# Patient Record
Sex: MALE | Race: WHITE | Employment: OTHER | URBAN - METROPOLITAN AREA
[De-identification: names, ages, dates, MRNs, and addresses within clinical notes are randomized per-mention and may not be internally consistent; named-entity substitution may affect disease eponyms.]

---

## 2017-01-20 ENCOUNTER — TELEPHONE (OUTPATIENT)
Dept: INTERNAL MEDICINE CLINIC | Age: 62
End: 2017-01-20

## 2017-03-02 RX ORDER — ATORVASTATIN CALCIUM 40 MG/1
TABLET, FILM COATED ORAL
Qty: 30 TABLET | Refills: 0 | Status: SHIPPED | OUTPATIENT
Start: 2017-03-02 | End: 2017-03-10 | Stop reason: SDUPTHER

## 2017-03-10 ENCOUNTER — OFFICE VISIT (OUTPATIENT)
Dept: INTERNAL MEDICINE CLINIC | Age: 62
End: 2017-03-10

## 2017-03-10 VITALS
WEIGHT: 177.8 LBS | DIASTOLIC BLOOD PRESSURE: 74 MMHG | HEART RATE: 80 BPM | SYSTOLIC BLOOD PRESSURE: 162 MMHG | BODY MASS INDEX: 24.11 KG/M2 | RESPIRATION RATE: 16 BRPM

## 2017-03-10 DIAGNOSIS — I62.9 INTRACRANIAL BLEED (HCC): Primary | ICD-10-CM

## 2017-03-10 DIAGNOSIS — I10 ESSENTIAL HYPERTENSION: ICD-10-CM

## 2017-03-10 DIAGNOSIS — K92.2 GASTROINTESTINAL HEMORRHAGE, UNSPECIFIED GASTROINTESTINAL HEMORRHAGE TYPE: ICD-10-CM

## 2017-03-10 LAB
A/G RATIO: 1.6 (ref 1.1–2.2)
ALBUMIN SERPL-MCNC: 4.3 G/DL (ref 3.4–5)
ALP BLD-CCNC: 89 U/L (ref 40–129)
ALT SERPL-CCNC: 16 U/L (ref 10–40)
ANION GAP SERPL CALCULATED.3IONS-SCNC: 13 MMOL/L (ref 3–16)
AST SERPL-CCNC: 17 U/L (ref 15–37)
BASOPHILS ABSOLUTE: 0.1 K/UL (ref 0–0.2)
BASOPHILS RELATIVE PERCENT: 1.3 %
BILIRUB SERPL-MCNC: 0.5 MG/DL (ref 0–1)
BUN BLDV-MCNC: 13 MG/DL (ref 7–20)
CALCIUM SERPL-MCNC: 9.3 MG/DL (ref 8.3–10.6)
CHLORIDE BLD-SCNC: 104 MMOL/L (ref 99–110)
CO2: 25 MMOL/L (ref 21–32)
CREAT SERPL-MCNC: 0.9 MG/DL (ref 0.8–1.3)
EOSINOPHILS ABSOLUTE: 0.3 K/UL (ref 0–0.6)
EOSINOPHILS RELATIVE PERCENT: 4.1 %
GFR AFRICAN AMERICAN: >60
GFR NON-AFRICAN AMERICAN: >60
GLOBULIN: 2.7 G/DL
GLUCOSE BLD-MCNC: 95 MG/DL (ref 70–99)
HCT VFR BLD CALC: 40.7 % (ref 40.5–52.5)
HEMOGLOBIN: 13.6 G/DL (ref 13.5–17.5)
LYMPHOCYTES ABSOLUTE: 1.1 K/UL (ref 1–5.1)
LYMPHOCYTES RELATIVE PERCENT: 18.2 %
MCH RBC QN AUTO: 30 PG (ref 26–34)
MCHC RBC AUTO-ENTMCNC: 33.5 G/DL (ref 31–36)
MCV RBC AUTO: 89.5 FL (ref 80–100)
MONOCYTES ABSOLUTE: 0.7 K/UL (ref 0–1.3)
MONOCYTES RELATIVE PERCENT: 12 %
NEUTROPHILS ABSOLUTE: 3.9 K/UL (ref 1.7–7.7)
NEUTROPHILS RELATIVE PERCENT: 64.4 %
PDW BLD-RTO: 13.8 % (ref 12.4–15.4)
PLATELET # BLD: 192 K/UL (ref 135–450)
PMV BLD AUTO: 9.8 FL (ref 5–10.5)
POTASSIUM SERPL-SCNC: 3.8 MMOL/L (ref 3.5–5.1)
RBC # BLD: 4.54 M/UL (ref 4.2–5.9)
SODIUM BLD-SCNC: 142 MMOL/L (ref 136–145)
TOTAL PROTEIN: 7 G/DL (ref 6.4–8.2)
WBC # BLD: 6.1 K/UL (ref 4–11)

## 2017-03-10 PROCEDURE — G8420 CALC BMI NORM PARAMETERS: HCPCS | Performed by: INTERNAL MEDICINE

## 2017-03-10 PROCEDURE — G8427 DOCREV CUR MEDS BY ELIG CLIN: HCPCS | Performed by: INTERNAL MEDICINE

## 2017-03-10 PROCEDURE — 3017F COLORECTAL CA SCREEN DOC REV: CPT | Performed by: INTERNAL MEDICINE

## 2017-03-10 PROCEDURE — 1036F TOBACCO NON-USER: CPT | Performed by: INTERNAL MEDICINE

## 2017-03-10 PROCEDURE — G8484 FLU IMMUNIZE NO ADMIN: HCPCS | Performed by: INTERNAL MEDICINE

## 2017-03-10 PROCEDURE — 99213 OFFICE O/P EST LOW 20 MIN: CPT | Performed by: INTERNAL MEDICINE

## 2017-03-10 RX ORDER — LISINOPRIL 20 MG/1
TABLET ORAL
Qty: 30 TABLET | Refills: 3 | Status: SHIPPED | OUTPATIENT
Start: 2017-03-10 | End: 2017-10-05 | Stop reason: SDUPTHER

## 2017-03-10 RX ORDER — TADALAFIL 5 MG/1
5 TABLET ORAL PRN
Qty: 30 TABLET | Refills: 0 | Status: SHIPPED | OUTPATIENT
Start: 2017-03-10 | End: 2017-10-05 | Stop reason: SDUPTHER

## 2017-03-10 RX ORDER — NYSTATIN 100000 [USP'U]/G
POWDER TOPICAL
Qty: 60 G | Refills: 2 | Status: SHIPPED | OUTPATIENT
Start: 2017-03-10 | End: 2017-10-05

## 2017-03-10 RX ORDER — OMEPRAZOLE 20 MG/1
20 CAPSULE, DELAYED RELEASE ORAL DAILY
Qty: 30 CAPSULE | Refills: 3 | Status: SHIPPED | OUTPATIENT
Start: 2017-03-10 | End: 2017-10-05 | Stop reason: SDUPTHER

## 2017-03-10 RX ORDER — AMLODIPINE BESYLATE 10 MG/1
TABLET ORAL
Qty: 30 TABLET | Refills: 3 | Status: SHIPPED | OUTPATIENT
Start: 2017-03-10 | End: 2017-10-05 | Stop reason: SDUPTHER

## 2017-03-10 RX ORDER — ATORVASTATIN CALCIUM 40 MG/1
TABLET, FILM COATED ORAL
Qty: 30 TABLET | Refills: 3 | Status: SHIPPED | OUTPATIENT
Start: 2017-03-10 | End: 2017-10-05 | Stop reason: SDUPTHER

## 2017-03-10 ASSESSMENT — ENCOUNTER SYMPTOMS
ALLERGIC/IMMUNOLOGIC NEGATIVE: 1
EYES NEGATIVE: 1
GASTROINTESTINAL NEGATIVE: 1
RESPIRATORY NEGATIVE: 1

## 2017-04-03 ENCOUNTER — TELEPHONE (OUTPATIENT)
Dept: INTERNAL MEDICINE CLINIC | Age: 62
End: 2017-04-03

## 2017-04-04 ENCOUNTER — HOSPITAL ENCOUNTER (OUTPATIENT)
Dept: ULTRASOUND IMAGING | Age: 62
Discharge: HOME | End: 2017-04-04
Attending: NURSE PRACTITIONER | Admitting: NURSE PRACTITIONER

## 2017-04-04 ENCOUNTER — OFFICE VISIT (OUTPATIENT)
Dept: INTERNAL MEDICINE CLINIC | Age: 62
End: 2017-04-04

## 2017-04-04 VITALS
BODY MASS INDEX: 24.11 KG/M2 | SYSTOLIC BLOOD PRESSURE: 136 MMHG | HEIGHT: 72 IN | HEART RATE: 60 BPM | WEIGHT: 178 LBS | DIASTOLIC BLOOD PRESSURE: 82 MMHG | TEMPERATURE: 98 F

## 2017-04-04 DIAGNOSIS — N50.89 SCROTAL SWELLING: ICD-10-CM

## 2017-04-04 DIAGNOSIS — N50.82 SCROTAL PAIN: Primary | ICD-10-CM

## 2017-04-04 DIAGNOSIS — N50.82 PAIN IN SCROTUM: ICD-10-CM

## 2017-04-04 DIAGNOSIS — N50.82 SCROTAL PAIN: ICD-10-CM

## 2017-04-04 DIAGNOSIS — I86.1 LEFT VARICOCELE: Primary | ICD-10-CM

## 2017-04-04 PROCEDURE — G8420 CALC BMI NORM PARAMETERS: HCPCS | Performed by: NURSE PRACTITIONER

## 2017-04-04 PROCEDURE — G8427 DOCREV CUR MEDS BY ELIG CLIN: HCPCS | Performed by: NURSE PRACTITIONER

## 2017-04-04 PROCEDURE — 1036F TOBACCO NON-USER: CPT | Performed by: NURSE PRACTITIONER

## 2017-04-04 PROCEDURE — 99213 OFFICE O/P EST LOW 20 MIN: CPT | Performed by: NURSE PRACTITIONER

## 2017-04-04 PROCEDURE — 3017F COLORECTAL CA SCREEN DOC REV: CPT | Performed by: NURSE PRACTITIONER

## 2017-04-04 ASSESSMENT — ENCOUNTER SYMPTOMS
VOMITING: 0
NAUSEA: 0
CONSTIPATION: 0
GASTROINTESTINAL NEGATIVE: 1
DIARRHEA: 0

## 2017-04-05 ENCOUNTER — TELEPHONE (OUTPATIENT)
Dept: INTERNAL MEDICINE CLINIC | Age: 62
End: 2017-04-05

## 2017-04-05 LAB
BILIRUBIN URINE: NEGATIVE
BLOOD, URINE: NEGATIVE
CLARITY: CLEAR
COLOR: YELLOW
GLUCOSE URINE: NEGATIVE MG/DL
KETONES, URINE: NEGATIVE MG/DL
LEUKOCYTE ESTERASE, URINE: NEGATIVE
MICROSCOPIC EXAMINATION: NORMAL
NITRITE, URINE: NEGATIVE
PH UA: 5
PROTEIN UA: NEGATIVE MG/DL
SPECIFIC GRAVITY UA: 1.02
URINE REFLEX TO CULTURE: NORMAL
URINE TYPE: NORMAL
UROBILINOGEN, URINE: 0.2 E.U./DL

## 2017-06-09 ENCOUNTER — TELEPHONE (OUTPATIENT)
Dept: INTERNAL MEDICINE CLINIC | Age: 62
End: 2017-06-09

## 2017-06-09 RX ORDER — SILDENAFIL 100 MG/1
100 TABLET, FILM COATED ORAL PRN
Qty: 6 TABLET | Refills: 3 | Status: SHIPPED | OUTPATIENT
Start: 2017-06-09

## 2017-09-28 ENCOUNTER — TELEPHONE (OUTPATIENT)
Dept: INTERNAL MEDICINE CLINIC | Age: 62
End: 2017-09-28

## 2017-10-05 ENCOUNTER — OFFICE VISIT (OUTPATIENT)
Dept: INTERNAL MEDICINE CLINIC | Age: 62
End: 2017-10-05

## 2017-10-05 VITALS
WEIGHT: 175 LBS | DIASTOLIC BLOOD PRESSURE: 74 MMHG | HEART RATE: 64 BPM | BODY MASS INDEX: 23.73 KG/M2 | SYSTOLIC BLOOD PRESSURE: 132 MMHG | RESPIRATION RATE: 12 BRPM

## 2017-10-05 DIAGNOSIS — K92.2 GASTROINTESTINAL HEMORRHAGE, UNSPECIFIED GASTROINTESTINAL HEMORRHAGE TYPE: ICD-10-CM

## 2017-10-05 DIAGNOSIS — I10 ESSENTIAL HYPERTENSION: ICD-10-CM

## 2017-10-05 DIAGNOSIS — N52.9 ERECTILE DYSFUNCTION, UNSPECIFIED ERECTILE DYSFUNCTION TYPE: ICD-10-CM

## 2017-10-05 DIAGNOSIS — B36.9 FUNGAL RASH OF TRUNK: Primary | ICD-10-CM

## 2017-10-05 DIAGNOSIS — F51.01 PRIMARY INSOMNIA: ICD-10-CM

## 2017-10-05 PROCEDURE — 1036F TOBACCO NON-USER: CPT | Performed by: INTERNAL MEDICINE

## 2017-10-05 PROCEDURE — G8420 CALC BMI NORM PARAMETERS: HCPCS | Performed by: INTERNAL MEDICINE

## 2017-10-05 PROCEDURE — G8427 DOCREV CUR MEDS BY ELIG CLIN: HCPCS | Performed by: INTERNAL MEDICINE

## 2017-10-05 PROCEDURE — 99214 OFFICE O/P EST MOD 30 MIN: CPT | Performed by: INTERNAL MEDICINE

## 2017-10-05 PROCEDURE — G8484 FLU IMMUNIZE NO ADMIN: HCPCS | Performed by: INTERNAL MEDICINE

## 2017-10-05 PROCEDURE — 3017F COLORECTAL CA SCREEN DOC REV: CPT | Performed by: INTERNAL MEDICINE

## 2017-10-05 RX ORDER — CLONAZEPAM 0.5 MG/1
0.5 TABLET ORAL 2 TIMES DAILY
Qty: 60 TABLET | Refills: 2 | Status: SHIPPED | OUTPATIENT
Start: 2017-10-05

## 2017-10-05 RX ORDER — LISINOPRIL 20 MG/1
TABLET ORAL
Qty: 30 TABLET | Refills: 3 | Status: SHIPPED | OUTPATIENT
Start: 2017-10-05 | End: 2017-10-05 | Stop reason: SDUPTHER

## 2017-10-05 RX ORDER — ATORVASTATIN CALCIUM 40 MG/1
TABLET, FILM COATED ORAL
Qty: 30 TABLET | Refills: 3 | Status: SHIPPED | OUTPATIENT
Start: 2017-10-05 | End: 2017-10-05 | Stop reason: SDUPTHER

## 2017-10-05 RX ORDER — NYSTATIN 100000 U/G
CREAM TOPICAL
Qty: 30 G | Refills: 1 | Status: SHIPPED | OUTPATIENT
Start: 2017-10-05

## 2017-10-05 RX ORDER — AMLODIPINE BESYLATE 10 MG/1
TABLET ORAL
Qty: 30 TABLET | Refills: 3 | Status: SHIPPED | OUTPATIENT
Start: 2017-10-05 | End: 2017-10-05 | Stop reason: SDUPTHER

## 2017-10-05 RX ORDER — OMEPRAZOLE 20 MG/1
20 CAPSULE, DELAYED RELEASE ORAL DAILY
Qty: 30 CAPSULE | Refills: 3 | Status: SHIPPED | OUTPATIENT
Start: 2017-10-05 | End: 2017-10-05 | Stop reason: SDUPTHER

## 2017-10-05 RX ORDER — AMLODIPINE BESYLATE 10 MG/1
TABLET ORAL
Qty: 90 TABLET | Refills: 3 | Status: SHIPPED | OUTPATIENT
Start: 2017-10-05

## 2017-10-05 RX ORDER — OMEPRAZOLE 20 MG/1
CAPSULE, DELAYED RELEASE ORAL
Qty: 90 CAPSULE | Refills: 3 | Status: SHIPPED | OUTPATIENT
Start: 2017-10-05

## 2017-10-05 RX ORDER — TRIAMCINOLONE ACETONIDE 5 MG/G
CREAM TOPICAL
Qty: 15 G | Refills: 1 | Status: SHIPPED | OUTPATIENT
Start: 2017-10-05 | End: 2017-10-12

## 2017-10-05 RX ORDER — LISINOPRIL 20 MG/1
TABLET ORAL
Qty: 90 TABLET | Refills: 3 | Status: SHIPPED | OUTPATIENT
Start: 2017-10-05

## 2017-10-05 RX ORDER — ATORVASTATIN CALCIUM 40 MG/1
TABLET, FILM COATED ORAL
Qty: 90 TABLET | Refills: 3 | Status: SHIPPED | OUTPATIENT
Start: 2017-10-05

## 2017-10-05 NOTE — MR AVS SNAPSHOT
After Visit Summary             Julia Beck   10/5/2017 11:30 AM   Office Visit    Description:  Male : 1955   Provider:  Afshan Shaikh MD   Department:  [facility]              Your Follow-Up and Future Appointments         Below is a list of your follow-up and future appointments. This may not be a complete list as you may have made appointments directly with providers that we are not aware of or your providers may have made some for you. Please call your providers to confirm appointments. It is important to keep your appointments. Please bring your current insurance card, photo ID, co-pay, and all medication bottles to your appointment. If self-pay, payment is expected at the time of service. Your To-Do List     Follow-Up    Return in about 4 months (around 2018). Information from Your Visit        Department     Name Address Phone Fax    [facility] [address] [place] [address] [phone]      You Were Seen for:         Comments    Fungal rash of trunk   [[id number]]         Vital Signs     Blood Pressure Pulse Respirations Weight Body Mass Index Smoking Status    132/74 (Site: Left Arm, Position: Sitting, Cuff Size: Medium Adult) 64 12 175 lb (79.4 kg) 23.73 kg/m2 Never Smoker         Today's Medication Changes          These changes are accurate as of: 10/5/17 12:36 PM.  If you have any questions, ask your nurse or doctor. START taking these medications           Avanafil 200 MG Tabs   Commonly known as:  STENDRA   Instructions: Take 1 tablet by mouth daily   Quantity:  6 tablet   Refills:  1   Started by: Afshan Shaikh MD       nystatin 831556 UNIT/GM cream   Commonly known as:  MYCOSTATIN   Instructions:  Apply topically 2 times daily. Quantity:  30 g   Refills:  1   Replaces:  nystatin 100181 UNIT/GM powder   Started by:   Afshan Shaikh MD Scheduling Instructions:    Dr Edgar Mccormick  [place], [address] [address]# [phone]    Comments: The patient can be scheduled with any member of the group, including the provider with the first available appointments. Additional Information        Basic Information     Date Of Birth Sex Race Ethnicity Preferred Language    1955 Male White Non-/Non  English      Problem List as of 10/5/2017  Date Reviewed: 9/27/2013                CVA (cerebrovascular accident due to intracerebral hemorrhage) ([facility] [address]    Intracranial bleed ([address]    Headache, chronic daily    HTN (hypertension)      Preventive Care        Date Due    HIV screening is recommended for all people regardless of risk factors  aged 15-65 years at least once (lifetime) who have never been HIV tested. 10/15/1970    Tetanus Combination Vaccine (1 - Tdap) 10/15/1974    Colon Cancer Stool Test 10/13/2015    Zoster Vaccine 10/15/2015    Yearly Flu Vaccine (1) 9/1/2017    Cholesterol Screening 5/24/2021            MyChart Signup           Our records indicate that you have declined MyChart signup.

## 2017-10-16 ASSESSMENT — ENCOUNTER SYMPTOMS
ANAL BLEEDING: 0
VOMITING: 0
NAUSEA: 1
ABDOMINAL DISTENTION: 1
RESPIRATORY NEGATIVE: 1
CONSTIPATION: 0
ALLERGIC/IMMUNOLOGIC NEGATIVE: 1
RECTAL PAIN: 0
DIARRHEA: 0
BLOOD IN STOOL: 0
EYES NEGATIVE: 1
ABDOMINAL PAIN: 1

## 2017-10-16 NOTE — PROGRESS NOTES
clonazePAM (KLONOPIN) 0.5 MG tablet; Take 1 tablet by mouth 2 times daily    Essential hypertension  -     Discontinue: atorvastatin (LIPITOR) 40 MG tablet; TAKE 1 TABLET BY MOUTH ONE TIME A DAY  -     Discontinue: amLODIPine (NORVASC) 10 MG tablet; TAKE 1 TABLET BY MOUTH DAILY  -     Discontinue: lisinopril (PRINIVIL;ZESTRIL) 20 MG tablet; TAKE 1 TABLET BY MOUTH DAILY    Gastrointestinal hemorrhage, unspecified gastrointestinal hemorrhage type  -     Discontinue: omeprazole (PRILOSEC) 20 MG delayed release capsule;  Take 1 capsule by mouth daily    Erectile dysfunction, unspecified erectile dysfunction type  -     Avanafil (STENDRA) 200 MG TABS; Take 1 tablet by mouth daily    Warts -  Patient given instructions on the use of over-the-counter topical wart remover

## 2017-11-01 ENCOUNTER — TELEPHONE (OUTPATIENT)
Dept: INTERNAL MEDICINE CLINIC | Age: 62
End: 2017-11-01

## 2017-11-01 NOTE — TELEPHONE ENCOUNTER
Pt calling asking to speak with Rasheed Corral about scheduling. He is a [place] Pt. Advised you were at lunch and would call back later.

## 2020-02-01 ENCOUNTER — HOSPITAL ENCOUNTER (OUTPATIENT)
Dept: GENERAL RADIOLOGY | Age: 65
Discharge: HOME | End: 2020-02-01
Payer: MEDICARE

## 2020-02-01 ENCOUNTER — HOSPITAL ENCOUNTER (OUTPATIENT)
Age: 65
Discharge: HOME | End: 2020-02-01
Payer: MEDICARE

## 2020-02-01 PROCEDURE — 73130 X-RAY EXAM OF HAND: CPT

## 2020-05-06 ENCOUNTER — OFFICE VISIT (OUTPATIENT)
Dept: ORTHOPEDIC SURGERY | Age: 65
End: 2020-05-06
Payer: MEDICARE

## 2020-05-06 VITALS — BODY MASS INDEX: 24.11 KG/M2 | TEMPERATURE: 96.8 F | WEIGHT: 178 LBS | HEIGHT: 72 IN

## 2020-05-06 PROCEDURE — 3017F COLORECTAL CA SCREEN DOC REV: CPT | Performed by: PHYSICIAN ASSISTANT

## 2020-05-06 PROCEDURE — 99203 OFFICE O/P NEW LOW 30 MIN: CPT | Performed by: PHYSICIAN ASSISTANT

## 2020-05-06 PROCEDURE — 1036F TOBACCO NON-USER: CPT | Performed by: PHYSICIAN ASSISTANT

## 2020-05-06 PROCEDURE — G8420 CALC BMI NORM PARAMETERS: HCPCS | Performed by: PHYSICIAN ASSISTANT

## 2020-05-06 PROCEDURE — G8427 DOCREV CUR MEDS BY ELIG CLIN: HCPCS | Performed by: PHYSICIAN ASSISTANT

## 2021-08-25 ENCOUNTER — HOSPITAL ENCOUNTER (OUTPATIENT)
Dept: GENERAL RADIOLOGY | Age: 66
Discharge: HOME | End: 2021-08-25
Payer: MEDICARE

## 2021-08-25 ENCOUNTER — HOSPITAL ENCOUNTER (OUTPATIENT)
Age: 66
Discharge: HOME | End: 2021-08-25
Payer: MEDICARE

## 2021-08-25 DIAGNOSIS — T14.90XA TRAUMA: ICD-10-CM

## 2021-08-25 PROCEDURE — 73502 X-RAY EXAM HIP UNI 2-3 VIEWS: CPT

## 2022-05-24 ENCOUNTER — HOSPITAL ENCOUNTER (EMERGENCY)
Age: 67
Discharge: HOME | End: 2022-05-24
Payer: MEDICARE

## 2022-05-24 ENCOUNTER — APPOINTMENT (OUTPATIENT)
Dept: CT IMAGING | Age: 67
End: 2022-05-24
Payer: MEDICARE

## 2022-05-24 VITALS
BODY MASS INDEX: 22.35 KG/M2 | SYSTOLIC BLOOD PRESSURE: 135 MMHG | OXYGEN SATURATION: 94 % | HEIGHT: 72 IN | RESPIRATION RATE: 18 BRPM | DIASTOLIC BLOOD PRESSURE: 92 MMHG | TEMPERATURE: 98.2 F | HEART RATE: 104 BPM | WEIGHT: 165 LBS

## 2022-05-24 DIAGNOSIS — M47.812 OSTEOARTHRITIS OF CERVICAL SPINE, UNSPECIFIED SPINAL OSTEOARTHRITIS COMPLICATION STATUS: Primary | ICD-10-CM

## 2022-05-24 DIAGNOSIS — M43.22 CERVICAL VERTEBRAL FUSION: ICD-10-CM

## 2022-05-24 DIAGNOSIS — M54.2 CERVICAL PAIN: ICD-10-CM

## 2022-05-24 LAB
A/G RATIO: 1.3 (ref 1.1–2.2)
ALBUMIN SERPL-MCNC: 4.4 G/DL (ref 3.4–5)
ALP BLD-CCNC: 103 U/L (ref 40–129)
ALT SERPL-CCNC: 22 U/L (ref 10–40)
ANION GAP SERPL CALCULATED.3IONS-SCNC: 12 MMOL/L (ref 3–16)
AST SERPL-CCNC: 20 U/L (ref 15–37)
BASOPHILS ABSOLUTE: 0.1 K/UL (ref 0–0.2)
BASOPHILS RELATIVE PERCENT: 1.4 %
BILIRUB SERPL-MCNC: 1 MG/DL (ref 0–1)
BUN BLDV-MCNC: 15 MG/DL (ref 7–20)
CALCIUM SERPL-MCNC: 9.6 MG/DL (ref 8.3–10.6)
CHLORIDE BLD-SCNC: 107 MMOL/L (ref 99–110)
CO2: 22 MMOL/L (ref 21–32)
CREAT SERPL-MCNC: 1 MG/DL (ref 0.8–1.3)
EOSINOPHILS ABSOLUTE: 0.3 K/UL (ref 0–0.6)
EOSINOPHILS RELATIVE PERCENT: 3.5 %
GFR AFRICAN AMERICAN: >60
GFR NON-AFRICAN AMERICAN: >60
GLUCOSE BLD-MCNC: 120 MG/DL (ref 70–99)
HCT VFR BLD CALC: 39.4 % (ref 40.5–52.5)
HEMOGLOBIN: 13.1 G/DL (ref 13.5–17.5)
LYMPHOCYTES ABSOLUTE: 0.8 K/UL (ref 1–5.1)
LYMPHOCYTES RELATIVE PERCENT: 9.9 %
MCH RBC QN AUTO: 29.8 PG (ref 26–34)
MCHC RBC AUTO-ENTMCNC: 33.2 G/DL (ref 31–36)
MCV RBC AUTO: 89.9 FL (ref 80–100)
MONOCYTES ABSOLUTE: 1.1 K/UL (ref 0–1.3)
MONOCYTES RELATIVE PERCENT: 13.1 %
NEUTROPHILS ABSOLUTE: 6.1 K/UL (ref 1.7–7.7)
NEUTROPHILS RELATIVE PERCENT: 72.1 %
PDW BLD-RTO: 13.3 % (ref 12.4–15.4)
PLATELET # BLD: 208 K/UL (ref 135–450)
PMV BLD AUTO: 9.9 FL (ref 5–10.5)
POTASSIUM REFLEX MAGNESIUM: 4.4 MMOL/L (ref 3.5–5.1)
RBC # BLD: 4.39 M/UL (ref 4.2–5.9)
SODIUM BLD-SCNC: 141 MMOL/L (ref 136–145)
TOTAL PROTEIN: 7.7 G/DL (ref 6.4–8.2)
WBC # BLD: 8.5 K/UL (ref 4–11)

## 2022-05-24 PROCEDURE — 85025 COMPLETE CBC W/AUTO DIFF WBC: CPT

## 2022-05-24 PROCEDURE — 99284 EMERGENCY DEPT VISIT MOD MDM: CPT

## 2022-05-24 PROCEDURE — 96372 THER/PROPH/DIAG INJ SC/IM: CPT

## 2022-05-24 PROCEDURE — 6360000002 HC RX W HCPCS: Performed by: NURSE PRACTITIONER

## 2022-05-24 PROCEDURE — 80053 COMPREHEN METABOLIC PANEL: CPT

## 2022-05-24 PROCEDURE — 70450 CT HEAD/BRAIN W/O DYE: CPT

## 2022-05-24 PROCEDURE — 6370000000 HC RX 637 (ALT 250 FOR IP): Performed by: NURSE PRACTITIONER

## 2022-05-24 PROCEDURE — 72125 CT NECK SPINE W/O DYE: CPT

## 2022-05-24 RX ORDER — LIDOCAINE 4 G/G
1 PATCH TOPICAL ONCE
Status: DISCONTINUED | OUTPATIENT
Start: 2022-05-24 | End: 2022-05-24 | Stop reason: HOSPADM

## 2022-05-24 RX ORDER — KETOROLAC TROMETHAMINE 30 MG/ML
15 INJECTION, SOLUTION INTRAMUSCULAR; INTRAVENOUS ONCE
Status: COMPLETED | OUTPATIENT
Start: 2022-05-24 | End: 2022-05-24

## 2022-05-24 RX ORDER — METHOCARBAMOL 500 MG/1
1000 TABLET, FILM COATED ORAL ONCE
Status: COMPLETED | OUTPATIENT
Start: 2022-05-24 | End: 2022-05-24

## 2022-05-24 RX ORDER — LIDOCAINE 4 G/G
1 PATCH TOPICAL DAILY
Qty: 30 PATCH | Refills: 0 | Status: SHIPPED | OUTPATIENT
Start: 2022-05-24 | End: 2022-06-23

## 2022-05-24 RX ORDER — METHOCARBAMOL 500 MG/1
500 TABLET, FILM COATED ORAL 3 TIMES DAILY
Qty: 15 TABLET | Refills: 0 | Status: SHIPPED | OUTPATIENT
Start: 2022-05-24 | End: 2022-05-29

## 2022-05-24 RX ADMIN — KETOROLAC TROMETHAMINE 15 MG: 30 INJECTION, SOLUTION INTRAMUSCULAR at 11:10

## 2022-05-24 RX ADMIN — METHOCARBAMOL 1000 MG: 500 TABLET ORAL at 11:10

## 2022-05-24 ASSESSMENT — PAIN DESCRIPTION - LOCATION: LOCATION: HEAD

## 2022-05-24 ASSESSMENT — PAIN SCALES - GENERAL
PAINLEVEL_OUTOF10: 10
PAINLEVEL_OUTOF10: 10

## 2022-05-24 ASSESSMENT — PAIN - FUNCTIONAL ASSESSMENT: PAIN_FUNCTIONAL_ASSESSMENT: 0-10

## 2022-05-24 NOTE — ED PROVIDER NOTES
[facility]        Pt Name: Rodriguez Gallegos  MRN: [id number]  [place] 1955  Date of evaluation: 5/24/2022  Provider: GOLDEN Arellano - CNP  PCP: Geovanny Dimas MD  Note Started: 12:30 PM EDT       OTILIO. I have evaluated this patient. My supervising physician was available for consultation. CHIEF COMPLAINT       Chief Complaint   Patient presents with    Headache     pt states fell of motor home in 08, had a vessel they couldn't fix, today with headache worse in his life, couldn't turn his head to left today       HISTORY OF PRESENT ILLNESS   (Location, Timing/Onset, Context/Setting, Quality, Duration, Modifying Factors, Severity, Associated Signs and Symptoms)  Note limiting factors. Chief Complaint: Headache, neck pain    Rodriguez Gallegos is a 77 y.o. male who presents with complaints of left paraspinous neck pain that radiates into his head for the past 2 days when her awoke. Feels like he has spasms. Has pain and decreased range of motion with right lateral neck rotation. He does note he had a previous injury in 2008 when he fell off the roof of a [place] home and injured his head and neck. He did not undergo previous cervical anterior discectomy and cervical fusion. He denies taking medication for the symptoms. He denies any associated visual disturbance, ataxia, numbness, tingling, weakness, dysphagia, dysarthria, chest pain, cough, wheezing, nausea, vomiting, diarrhea, lightheaded, dizzy, syncope, or confusion. Nursing Notes were all reviewed and agreed with or any disagreements were addressed in the HPI. REVIEW OF SYSTEMS    (2-9 systems for level 4, 10 or more for level 5)     Review of Systems    Positives and Pertinent negatives as per HPI. Except as noted above in the ROS, all other systems were reviewed and negative.        PAST MEDICAL HISTORY     Past Medical History:   Diagnosis Date    Headache     Hypertension     Intracerebral hemorrhage ([facility] [address] 5/12/2013    Recurrent 8/6/13 - Right occipitoparietal    Neck pain     Unspecified cerebral artery occlusion with cerebral infarction          SURGICAL HISTORY     Past Surgical History:   Procedure Laterality Date    SHOULDER SURGERY           CURRENTMEDICATIONS       Previous Medications    AMLODIPINE (NORVASC) 10 MG TABLET    TAKE 1 TABLET BY MOUTH DAILY    ATORVASTATIN (LIPITOR) 40 MG TABLET    TAKE 1 TABLET BY MOUTH EVERY DAY    AVANAFIL (STENDRA) 200 MG TABS    Take 1 tablet by mouth daily    CLONAZEPAM (KLONOPIN) 0.5 MG TABLET    Take 1 tablet by mouth 2 times daily    DICLOFENAC SODIUM (VOLTAREN) 1 % GEL    Apply 4 g topically 4 times daily    LISINOPRIL (PRINIVIL;ZESTRIL) 20 MG TABLET    TAKE 1 TABLET BY MOUTH DAILY    NYSTATIN (MYCOSTATIN) 028907 UNIT/GM CREAM    Apply topically 2 times daily. OMEPRAZOLE (PRILOSEC) 20 MG DELAYED RELEASE CAPSULE    TAKE 1 CAPSULE BY MOUTH DAILY    SILDENAFIL (VIAGRA) 100 MG TABLET    Take 1 tablet by mouth as needed for Erectile Dysfunction    TADALAFIL (CIALIS) 5 MG TABLET    One po daily for erectile dysfunction         ALLERGIES     Patient has no known allergies. FAMILYHISTORY       Family History   Problem Relation Age of Onset    Cancer Mother     Diabetes Father     Diabetes Sister           SOCIAL HISTORY       Social History     Tobacco Use    Smoking status: Never Smoker    Smokeless tobacco: Never Used   Vaping Use    Vaping Use: Never used   Substance Use Topics    Alcohol use:  Yes     Alcohol/week: 0.0 standard drinks     Comment: occasional beer    Drug use: No       SCREENINGS    [place] Coma Scale  Eye Opening: Spontaneous  Best Verbal Response: Oriented  Best Motor Response: Obeys commands  [place] Coma Scale Score: 15        PHYSICAL EXAM    (up to 7 for level 4, 8 or more for level 5)     ED Triage Vitals [05/24/22 1056]   BP Temp Temp Source Pulse Resp SpO2 Height Weight   (!) 135/92 98.2 °F (36.8 °C) Oral 104 18 94 % 6' (1.829 m) 165 lb (74.8 kg)       Physical Exam  Vitals and nursing note reviewed. Constitutional:       General: He is awake. Appearance: Normal appearance. He is well-developed and normal weight. HENT:      Head: Normocephalic and atraumatic. Right Ear: Hearing, tympanic membrane, ear canal and external ear normal.      Left Ear: Hearing, tympanic membrane, ear canal and external ear normal.      Nose: Nose normal.      Right Sinus: No maxillary sinus tenderness or frontal sinus tenderness. Left Sinus: No maxillary sinus tenderness or frontal sinus tenderness. Eyes:      General:         Right eye: No discharge. Left eye: No discharge. Extraocular Movements: Extraocular movements intact. Conjunctiva/sclera: Conjunctivae normal.   Neck:      Trachea: Trachea and phonation normal.   Cardiovascular:      Rate and Rhythm: Normal rate and regular rhythm. Pulses:           Radial pulses are 2+ on the right side and 2+ on the left side. Heart sounds: Normal heart sounds. Pulmonary:      Effort: Pulmonary effort is normal. No respiratory distress. Abdominal:      General: Bowel sounds are normal.      Palpations: Abdomen is soft. Tenderness: There is no abdominal tenderness. Musculoskeletal:      Cervical back: Torticollis present. Pain with movement (rt lateral rotation) and muscular tenderness (left paraspinous) present. No spinous process tenderness. Decreased range of motion (rt lateral rotation). Thoracic back: No tenderness or bony tenderness. Lumbar back: No tenderness or bony tenderness. Negative right straight leg raise test and negative left straight leg raise test.   Skin:     General: Skin is warm and dry. Coloration: Skin is not pale. Neurological:      General: No focal deficit present. Mental Status: He is alert and oriented to person, place, and time.       Cranial Nerves: Cranial nerves are intact. Sensory: Sensation is intact. Motor: Motor function is intact. Coordination: Coordination is intact. Gait: Gait is intact. Comments: Hand grasp strength 5/5   Psychiatric:         Behavior: Behavior normal. Behavior is cooperative. DIAGNOSTIC RESULTS   LABS:    Labs Reviewed   CBC WITH AUTO DIFFERENTIAL - Abnormal; Notable for the following components:       Result Value    Hemoglobin 13.1 (*)     Hematocrit 39.4 (*)     Lymphocytes Absolute 0.8 (*)     All other components within normal limits   COMPREHENSIVE METABOLIC PANEL W/ REFLEX TO MG FOR LOW K - Abnormal; Notable for the following components:    Glucose 120 (*)     All other components within normal limits       When ordered only abnormal lab results are displayed. All other labs were within normal range or not returned as of this dictation. EKG: When ordered, EKG's are interpreted by the Emergency Department Physician in the absence of a cardiologist.  Please see their note for interpretation of EKG. RADIOLOGY:   Non-plain film images such as CT, Ultrasound and MRI are read by the radiologist. Plain radiographic images are visualized and preliminarily interpreted by the ED Provider with the below findings:        Interpretation per the Radiologist below, if available at the time of this note:    CT Cervical Spine WO Contrast   Final Result   Degenerative changes as described above. Prior anterior discectomy and   fusion of C5, C6 and C7. .         CT Head WO Contrast   Final Result   No acute intracranial abnormality.            CT Head WO Contrast    Result Date: 5/24/2022  EXAMINATION: CT OF THE HEAD WITHOUT CONTRAST  5/24/2022 11:17 am TECHNIQUE: CT of the head was performed without the administration of intravenous contrast. Automated exposure control, iterative reconstruction, and/or weight based adjustment of the mA/kV was utilized to reduce the radiation dose to as low as suspected or confirmed emergency medical condition->Emergency Medical Condition (MA) Reason for Exam: left lateral neck pain with DROM FINDINGS: BONES/ALIGNMENT: There is no acute fracture or traumatic malalignment. DEGENERATIVE CHANGES: The patient has had prior anterior discectomy and fusion of the C5, C6 and C7 vertebra. Plate and screw device placed across from C5-C7. There are osteoarthritic changes of the right C3-C4, C4-C5 facet joints and the the left C3-C4, and C4-C5 facet joints. There is no evidence of spinal stenosis, or significant narrowing of the foramina. There is no evidence of disc protrusion. There is degenerative disc disease of the C4-C5 and C7-T1 discs, with disc space narrowing some anterior and posterior osteophytes. SOFT TISSUES: There is no prevertebral soft tissue swelling. Degenerative changes as described above. Prior anterior discectomy and fusion of C5, C6 and C7.. PROCEDURES   Unless otherwise noted below, none     Procedures    CRITICAL CARE TIME       CONSULTS:  None      EMERGENCY DEPARTMENT COURSE and DIFFERENTIAL DIAGNOSIS/MDM:   Vitals:    Vitals:    05/24/22 1056   BP: (!) 135/92   Pulse: 104   Resp: 18   Temp: 98.2 °F (36.8 °C)   TempSrc: Oral   SpO2: 94%   Weight: 165 lb (74.8 kg)   Height: 6' (1.829 m)       Patient was given the following medications:  Medications   lidocaine 4 % external patch 1 patch (1 patch TransDERmal Patch Applied 5/24/22 1113)   methocarbamol (ROBAXIN) tablet 1,000 mg (1,000 mg Oral Given 5/24/22 1110)   ketorolac (TORADOL) injection 15 mg (15 mg IntraMUSCular Given 5/24/22 1110)         Is this patient to be included in the SEP-1 Core Measure due to severe sepsis or septic shock? No   Exclusion criteria - the patient is NOT to be included for SEP-1 Core Measure due to: Infection is not suspected    Care of this patient took place during the COVID-19 pandemic emergency.     ED COURSE & MEDICAL DECISION MAKING    - The patient presented to the ER with complaints of  left lateral neck pain. Vital signs were reviewed. Exam well-developed, well-nourished male who appears uncomfortable. Labs, Imaging ordered. - Pertinent Labs & Imaging studies reviewed. (See chart for details)   -  Patient seen and evaluated in the emergency department. -  Triage and nursing notes reviewed and incorporated. -  Old chart records reviewed and incorporated. -  OTILIO. I have evaluated this patient. My supervising physician was available for consultation.  -  Differential diagnosis includes: CVA, TIA, ICH, SAH, aneurysm, dissection, meningitis, glioblastoma, meningioma, metabolic encephalopathy, VTE, SDH, dehydration, sepsis, COVID-1  -  Work-up included:  See above  -  ED treatment included:   Robaxin, Toradol, lidocaine patch  -  Results discussed with patient. Vikash Conde is a 80-year-old male with complaints of awakening 2 days ago with left lateral neck pain and spasms that is worse with right lateral rotation. Pain radiates into the base of his head causing a headache. He does have a history of chronic neck pain when he fell off a roof in 2008 and had a cervical discectomy and disc fusion. He denies taking medication for the symptoms. He denies any associated symptoms to include paresthesias, visual disturbance, or weakness. On exam no facial asymmetry noted. Equal hand grasp strength bilateral.  Cardiac exam with regular rate and rhythm. Radial pulse 2+ bilateral.  Lungs clear to auscultate bilateral.  No C, T, or L-spine bony tenderness noted. There is left paraspinous muscular tenderness and decreased range of motion with rotation to the right. No gait ataxia noted. Lab work and imaging is obtained. CBC with hemoglobin 13.1, hematocrit 39.4. CMP with glucose 120. CT cervical spine without contrast shows degenerative change as described above. Prior anterior discectomy and fusion of C5, C6, and C7.   CT head without contrast shows no acute or cranial abnormality. Patient was informed of these results. He was given strict return discharge instructions. Vital signs were rechecked. Pulse is 95. Shared decision making is complete and patient stable for discharge at this time. FINAL IMPRESSION      1. Osteoarthritis of cervical spine, unspecified spinal osteoarthritis complication status    2. Cervical vertebral fusion    3.  Cervical pain          DISPOSITION/PLAN   DISPOSITION Discharge - Pending Orders Complete 05/24/2022 12:10:46 PM      PATIENT REFERRED TO:  Dora Redmond MD  [place] [address]  [phone]    Call in 2 days  As needed, If symptoms worsen    [facility] Emergency Department  [address] [facility]  [phone]  Go to   As needed      DISCHARGE MEDICATIONS:  New Prescriptions    LIDOCAINE 4 % EXTERNAL PATCH    Place 1 patch onto the skin daily    METHOCARBAMOL (ROBAXIN) 500 MG TABLET    Take 1 tablet by mouth 3 times daily for 15 doses       DISCONTINUED MEDICATIONS:  Discontinued Medications    No medications on file              (Please note that portions of this note were completed with a voice recognition program.  Efforts were made to edit the dictations but occasionally words are mis-transcribed.)    GOLDEN Hutchinson CNP (electronically signed)            GOLDEN Hutchinson CNP  05/24/22 8036

## 2022-07-19 ENCOUNTER — HOSPITAL ENCOUNTER (OUTPATIENT)
Age: 67
Discharge: HOME | End: 2022-07-19
Payer: MEDICARE

## 2022-07-19 ENCOUNTER — HOSPITAL ENCOUNTER (OUTPATIENT)
Dept: GENERAL RADIOLOGY | Age: 67
Discharge: HOME | End: 2022-07-19
Payer: MEDICARE

## 2022-07-19 DIAGNOSIS — R05.9 COUGH: ICD-10-CM

## 2022-07-19 PROCEDURE — 71046 X-RAY EXAM CHEST 2 VIEWS: CPT

## 2023-03-01 ENCOUNTER — HOSPITAL ENCOUNTER (INPATIENT)
Age: 68
LOS: 6 days | Discharge: HOME | DRG: 246 | End: 2023-03-07
Attending: STUDENT IN AN ORGANIZED HEALTH CARE EDUCATION/TRAINING PROGRAM | Admitting: HOSPITALIST
Payer: MEDICARE

## 2023-03-01 ENCOUNTER — APPOINTMENT (OUTPATIENT)
Dept: CT IMAGING | Age: 68
DRG: 246 | End: 2023-03-01
Payer: MEDICARE

## 2023-03-01 DIAGNOSIS — J18.9 COMMUNITY ACQUIRED PNEUMONIA, UNSPECIFIED LATERALITY: Primary | ICD-10-CM

## 2023-03-01 DIAGNOSIS — I50.20 HFREF (HEART FAILURE WITH REDUCED EJECTION FRACTION) (HCC): ICD-10-CM

## 2023-03-01 DIAGNOSIS — I48.91 ATRIAL FIBRILLATION WITH RAPID VENTRICULAR RESPONSE (HCC): ICD-10-CM

## 2023-03-01 LAB
A/G RATIO: 1 (ref 1.1–2.2)
ALBUMIN SERPL-MCNC: 3.6 G/DL (ref 3.4–5)
ALP BLD-CCNC: 120 U/L (ref 40–129)
ALT SERPL-CCNC: 18 U/L (ref 10–40)
ANION GAP SERPL CALCULATED.3IONS-SCNC: 10 MMOL/L (ref 3–16)
AST SERPL-CCNC: 22 U/L (ref 15–37)
BASOPHILS ABSOLUTE: 0.1 K/UL (ref 0–0.2)
BASOPHILS RELATIVE PERCENT: 1.2 %
BILIRUB SERPL-MCNC: 1 MG/DL (ref 0–1)
BUN BLDV-MCNC: 20 MG/DL (ref 7–20)
CALCIUM SERPL-MCNC: 9.2 MG/DL (ref 8.3–10.6)
CHLORIDE BLD-SCNC: 107 MMOL/L (ref 99–110)
CO2: 23 MMOL/L (ref 21–32)
CREAT SERPL-MCNC: 1.1 MG/DL (ref 0.8–1.3)
EKG ATRIAL RATE: 197 BPM
EKG DIAGNOSIS: NORMAL
EKG Q-T INTERVAL: 290 MS
EKG QRS DURATION: 102 MS
EKG QTC CALCULATION (BAZETT): 478 MS
EKG R AXIS: -1 DEGREES
EKG T AXIS: 129 DEGREES
EKG VENTRICULAR RATE: 164 BPM
EOSINOPHILS ABSOLUTE: 0.3 K/UL (ref 0–0.6)
EOSINOPHILS RELATIVE PERCENT: 3.4 %
GFR SERPL CREATININE-BSD FRML MDRD: >60 ML/MIN/{1.73_M2}
GLUCOSE BLD-MCNC: 111 MG/DL (ref 70–99)
HCT VFR BLD CALC: 37.1 % (ref 40.5–52.5)
HEMOGLOBIN: 12.5 G/DL (ref 13.5–17.5)
LACTIC ACID, SEPSIS: 1.1 MMOL/L (ref 0.4–1.9)
LIPASE: 22 U/L (ref 13–60)
LYMPHOCYTES ABSOLUTE: 1.3 K/UL (ref 1–5.1)
LYMPHOCYTES RELATIVE PERCENT: 15.1 %
MAGNESIUM: 1.9 MG/DL (ref 1.8–2.4)
MCH RBC QN AUTO: 30.5 PG (ref 26–34)
MCHC RBC AUTO-ENTMCNC: 33.7 G/DL (ref 31–36)
MCV RBC AUTO: 90.6 FL (ref 80–100)
MONOCYTES ABSOLUTE: 1.1 K/UL (ref 0–1.3)
MONOCYTES RELATIVE PERCENT: 12 %
NEUTROPHILS ABSOLUTE: 6.1 K/UL (ref 1.7–7.7)
NEUTROPHILS RELATIVE PERCENT: 68.3 %
PDW BLD-RTO: 13.4 % (ref 12.4–15.4)
PLATELET # BLD: 207 K/UL (ref 135–450)
PMV BLD AUTO: 10.3 FL (ref 5–10.5)
POTASSIUM REFLEX MAGNESIUM: 4 MMOL/L (ref 3.5–5.1)
PRO-BNP: ABNORMAL PG/ML (ref 0–124)
RAPID INFLUENZA  B AGN: NEGATIVE
RAPID INFLUENZA A AGN: NEGATIVE
RBC # BLD: 4.1 M/UL (ref 4.2–5.9)
SARS-COV-2, NAAT: NOT DETECTED
SODIUM BLD-SCNC: 140 MMOL/L (ref 136–145)
TOTAL PROTEIN: 7.3 G/DL (ref 6.4–8.2)
TROPONIN: <0.01 NG/ML
WBC # BLD: 8.8 K/UL (ref 4–11)

## 2023-03-01 PROCEDURE — 87635 SARS-COV-2 COVID-19 AMP PRB: CPT

## 2023-03-01 PROCEDURE — 6360000002 HC RX W HCPCS: Performed by: HOSPITALIST

## 2023-03-01 PROCEDURE — 80053 COMPREHEN METABOLIC PANEL: CPT

## 2023-03-01 PROCEDURE — 87040 BLOOD CULTURE FOR BACTERIA: CPT

## 2023-03-01 PROCEDURE — 6360000004 HC RX CONTRAST MEDICATION: Performed by: STUDENT IN AN ORGANIZED HEALTH CARE EDUCATION/TRAINING PROGRAM

## 2023-03-01 PROCEDURE — 93005 ELECTROCARDIOGRAM TRACING: CPT | Performed by: STUDENT IN AN ORGANIZED HEALTH CARE EDUCATION/TRAINING PROGRAM

## 2023-03-01 PROCEDURE — 2580000003 HC RX 258: Performed by: STUDENT IN AN ORGANIZED HEALTH CARE EDUCATION/TRAINING PROGRAM

## 2023-03-01 PROCEDURE — 93010 ELECTROCARDIOGRAM REPORT: CPT | Performed by: INTERNAL MEDICINE

## 2023-03-01 PROCEDURE — 2580000003 HC RX 258: Performed by: HOSPITALIST

## 2023-03-01 PROCEDURE — 96374 THER/PROPH/DIAG INJ IV PUSH: CPT

## 2023-03-01 PROCEDURE — 1200000000 HC SEMI PRIVATE

## 2023-03-01 PROCEDURE — 6360000002 HC RX W HCPCS: Performed by: STUDENT IN AN ORGANIZED HEALTH CARE EDUCATION/TRAINING PROGRAM

## 2023-03-01 PROCEDURE — 84443 ASSAY THYROID STIM HORMONE: CPT

## 2023-03-01 PROCEDURE — 99285 EMERGENCY DEPT VISIT HI MDM: CPT

## 2023-03-01 PROCEDURE — 83605 ASSAY OF LACTIC ACID: CPT

## 2023-03-01 PROCEDURE — 83690 ASSAY OF LIPASE: CPT

## 2023-03-01 PROCEDURE — 71260 CT THORAX DX C+: CPT | Performed by: STUDENT IN AN ORGANIZED HEALTH CARE EDUCATION/TRAINING PROGRAM

## 2023-03-01 PROCEDURE — 83735 ASSAY OF MAGNESIUM: CPT

## 2023-03-01 PROCEDURE — 36415 COLL VENOUS BLD VENIPUNCTURE: CPT

## 2023-03-01 PROCEDURE — 83880 ASSAY OF NATRIURETIC PEPTIDE: CPT

## 2023-03-01 PROCEDURE — 96361 HYDRATE IV INFUSION ADD-ON: CPT

## 2023-03-01 PROCEDURE — 84484 ASSAY OF TROPONIN QUANT: CPT

## 2023-03-01 PROCEDURE — 2500000003 HC RX 250 WO HCPCS: Performed by: STUDENT IN AN ORGANIZED HEALTH CARE EDUCATION/TRAINING PROGRAM

## 2023-03-01 PROCEDURE — 85025 COMPLETE CBC W/AUTO DIFF WBC: CPT

## 2023-03-01 PROCEDURE — 87804 INFLUENZA ASSAY W/OPTIC: CPT

## 2023-03-01 RX ORDER — 0.9 % SODIUM CHLORIDE 0.9 %
500 INTRAVENOUS SOLUTION INTRAVENOUS ONCE
Status: COMPLETED | OUTPATIENT
Start: 2023-03-01 | End: 2023-03-01

## 2023-03-01 RX ORDER — PROPRANOLOL HYDROCHLORIDE 20 MG/1
20 TABLET ORAL DAILY
Status: ON HOLD | COMMUNITY
End: 2023-03-07 | Stop reason: HOSPADM

## 2023-03-01 RX ORDER — OXYCODONE HYDROCHLORIDE 15 MG/1
15 TABLET ORAL 3 TIMES DAILY PRN
Status: DISCONTINUED | OUTPATIENT
Start: 2023-03-01 | End: 2023-03-06 | Stop reason: SDUPTHER

## 2023-03-01 RX ORDER — ENOXAPARIN SODIUM 100 MG/ML
40 INJECTION SUBCUTANEOUS NIGHTLY
Status: DISCONTINUED | OUTPATIENT
Start: 2023-03-01 | End: 2023-03-02

## 2023-03-01 RX ORDER — DILTIAZEM HCL IN NACL,ISO-OSM 125 MG/125
2.5-15 PLASTIC BAG, INJECTION (ML) INTRAVENOUS CONTINUOUS
Status: DISCONTINUED | OUTPATIENT
Start: 2023-03-01 | End: 2023-03-03

## 2023-03-01 RX ORDER — POLYETHYLENE GLYCOL 3350 17 G/17G
17 POWDER, FOR SOLUTION ORAL DAILY PRN
Status: DISCONTINUED | OUTPATIENT
Start: 2023-03-01 | End: 2023-03-07 | Stop reason: HOSPADM

## 2023-03-01 RX ORDER — SODIUM CHLORIDE 9 MG/ML
INJECTION, SOLUTION INTRAVENOUS PRN
Status: DISCONTINUED | OUTPATIENT
Start: 2023-03-01 | End: 2023-03-07 | Stop reason: HOSPADM

## 2023-03-01 RX ORDER — SODIUM CHLORIDE 0.9 % (FLUSH) 0.9 %
5-40 SYRINGE (ML) INJECTION PRN
Status: DISCONTINUED | OUTPATIENT
Start: 2023-03-01 | End: 2023-03-07 | Stop reason: HOSPADM

## 2023-03-01 RX ORDER — ONDANSETRON 2 MG/ML
4 INJECTION INTRAMUSCULAR; INTRAVENOUS EVERY 6 HOURS PRN
Status: DISCONTINUED | OUTPATIENT
Start: 2023-03-01 | End: 2023-03-06

## 2023-03-01 RX ORDER — FLUTICASONE PROPIONATE 50 MCG
2 SPRAY, SUSPENSION (ML) NASAL DAILY
COMMUNITY

## 2023-03-01 RX ORDER — DILTIAZEM HYDROCHLORIDE 5 MG/ML
20 INJECTION INTRAVENOUS ONCE
Status: COMPLETED | OUTPATIENT
Start: 2023-03-01 | End: 2023-03-01

## 2023-03-01 RX ORDER — ONDANSETRON 4 MG/1
4 TABLET, ORALLY DISINTEGRATING ORAL EVERY 8 HOURS PRN
Status: DISCONTINUED | OUTPATIENT
Start: 2023-03-01 | End: 2023-03-07 | Stop reason: HOSPADM

## 2023-03-01 RX ORDER — OXYCODONE HYDROCHLORIDE 15 MG/1
15 TABLET ORAL 3 TIMES DAILY PRN
COMMUNITY

## 2023-03-01 RX ORDER — ACETAMINOPHEN 650 MG/1
650 SUPPOSITORY RECTAL EVERY 6 HOURS PRN
Status: DISCONTINUED | OUTPATIENT
Start: 2023-03-01 | End: 2023-03-07 | Stop reason: HOSPADM

## 2023-03-01 RX ORDER — ACETAMINOPHEN 325 MG/1
650 TABLET ORAL EVERY 6 HOURS PRN
Status: DISCONTINUED | OUTPATIENT
Start: 2023-03-01 | End: 2023-03-03 | Stop reason: SDUPTHER

## 2023-03-01 RX ORDER — SODIUM CHLORIDE 0.9 % (FLUSH) 0.9 %
5-40 SYRINGE (ML) INJECTION EVERY 12 HOURS SCHEDULED
Status: DISCONTINUED | OUTPATIENT
Start: 2023-03-01 | End: 2023-03-07 | Stop reason: HOSPADM

## 2023-03-01 RX ADMIN — Medication 5 MG/HR: at 18:04

## 2023-03-01 RX ADMIN — DILTIAZEM HYDROCHLORIDE 20 MG: 5 INJECTION INTRAVENOUS at 14:30

## 2023-03-01 RX ADMIN — CEFTRIAXONE SODIUM 1000 MG: 1 INJECTION, POWDER, FOR SOLUTION INTRAMUSCULAR; INTRAVENOUS at 18:04

## 2023-03-01 RX ADMIN — AZITHROMYCIN MONOHYDRATE 500 MG: 500 INJECTION, POWDER, LYOPHILIZED, FOR SOLUTION INTRAVENOUS at 20:47

## 2023-03-01 RX ADMIN — Medication 10 ML: at 20:44

## 2023-03-01 RX ADMIN — ENOXAPARIN SODIUM 40 MG: 100 INJECTION SUBCUTANEOUS at 20:43

## 2023-03-01 RX ADMIN — SODIUM CHLORIDE 500 ML: 9 INJECTION, SOLUTION INTRAVENOUS at 14:51

## 2023-03-01 RX ADMIN — IOPAMIDOL 75 ML: 755 INJECTION, SOLUTION INTRAVENOUS at 15:06

## 2023-03-01 ASSESSMENT — LIFESTYLE VARIABLES
HOW MANY STANDARD DRINKS CONTAINING ALCOHOL DO YOU HAVE ON A TYPICAL DAY: PATIENT DOES NOT DRINK
HOW MANY STANDARD DRINKS CONTAINING ALCOHOL DO YOU HAVE ON A TYPICAL DAY: PATIENT DOES NOT DRINK
HOW OFTEN DO YOU HAVE A DRINK CONTAINING ALCOHOL: NEVER
HOW OFTEN DO YOU HAVE A DRINK CONTAINING ALCOHOL: NEVER

## 2023-03-01 NOTE — PROGRESS NOTES
Pharmacy Home Medication Reconciliation Note    A medication reconciliation has been completed for Van Sinclair 1955    Pharmacy: [facility], [facility], [address] [place] [address]  Information provided by: patient    The patient's home medication list is as follows: No current facility-administered medications on file prior to encounter. Current Outpatient Medications on File Prior to Encounter   Medication Sig Dispense Refill    propranolol (INDERAL) 20 MG tablet Take 20 mg by mouth daily      Glucosamine HCl (GLUCOSAMINE PO) Take 1,000 mg by mouth daily      fluticasone (FLONASE) 50 MCG/ACT nasal spray 2 sprays by Each Nostril route daily      oxyCODONE (OXY-IR) 15 MG immediate release tablet Take 15 mg by mouth 3 times daily as needed for Pain. diclofenac sodium (VOLTAREN) 1 % GEL Apply 4 g topically 4 times daily (Patient not taking: Reported on 3/1/2023) 5 Tube 0    clonazePAM (KLONOPIN) 0.5 MG tablet Take 1 tablet by mouth 2 times daily (Patient not taking: Reported on 3/1/2023) 60 tablet 2    nystatin (MYCOSTATIN) 400701 UNIT/GM cream Apply topically 2 times daily.  (Patient not taking: Reported on 3/1/2023) 30 g 1    Avanafil (STENDRA) 200 MG TABS Take 1 tablet by mouth daily (Patient not taking: Reported on 3/1/2023) 6 tablet 1    omeprazole (PRILOSEC) 20 MG delayed release capsule TAKE 1 CAPSULE BY MOUTH DAILY (Patient taking differently: Take 20 mg by mouth Daily) 90 capsule 3    atorvastatin (LIPITOR) 40 MG tablet TAKE 1 TABLET BY MOUTH EVERY DAY (Patient not taking: No sig reported) 90 tablet 3    lisinopril (PRINIVIL;ZESTRIL) 20 MG tablet TAKE 1 TABLET BY MOUTH DAILY (Patient not taking: Reported on 3/1/2023) 90 tablet 3    amLODIPine (NORVASC) 10 MG tablet TAKE 1 TABLET BY MOUTH DAILY 90 tablet 3    sildenafil (VIAGRA) 100 MG tablet Take 1 tablet by mouth as needed for Erectile Dysfunction (Patient not taking: Reported on 3/1/2023) 6 tablet 3    tadalafil (CIALIS) 5 MG tablet One po daily for erectile dysfunction (Patient not taking: Reported on 3/1/2023) 30 tablet 2       Patient is no longer taking Avanafil, Clonazepam, Diclofenac gel, Lisinopril, Nystatin cream, Cialis    Of note,     Timing of last doses updated. Thank you,  Callum Abraham

## 2023-03-01 NOTE — PROGRESS NOTES
Patient seen in ED, room 20. Admission completed with the following exceptions:  4 Eyes Assessment, Immunizations, Covid Vaccines, Rights and Responsibilities, Orientation to room, Plan of Care, Education/Learning Assessment and Education Plan, white board, height and weight, pain assessment and head to toe assessment. Patient is alert and oriented X 4. Patient lives alone in a single level ranch house and is being admitted for A-Fib. Home Medications as well as Outside Sources has been verbally reviewed with patient and updated as appropriate and is now Completed. Plan of care updated if indicated. All questions answered.

## 2023-03-01 NOTE — LETTER
[address]  EP Procedure Sheet    3/7/23  [facility]  1955  EP Procedures  [] Pacemaker implant (single/dual) [] EP Study   [] ICD implant (single/dual) [] Atrial flutter ablation (ARAMIS Y/N)   [] Biv implant ICD [] Tilt Table   [] Biv implant PPM [] Atrial fibrillation ablation (ARAMIS Yes)   [] Generator Change (PPM/ICD/BiV) [] SVT ablation   [] Lead revision (RV/LA/RA) (<1 month) [] PVC ablation     [] Lead extraction +/- upgrade (BiV/PPM/ICD) [] VT Ischemic/ non-ischemic   [] Loop implant/ removal [] VT RVOT   [x] Cardioversion [] VT Left sided   [] ARAMIS [] AVN ablation   Equipment  [] [facility]  [] [employer]   [] [facility] [] [address]   [] [employer] [] CryoAblation   [] [employer] [] Laser Lead Extraction   EP Procedures Scheduling Request  # hours Requested  []1 []2 []2-4 [] 4-6 Scheduled  Date:   Specific Day 3-4 weeks Completed    Anesthesia []yes []no F/u Date:   CT surgery backup []yes []no     Overnight stay      Performing MD [x]RMM []MXA   []MKW [] CMV First vs repeat   []1st [] 2nd [] 3rd   Pre-Procedure Labs / Imaging  [] PT/INR [] Type & cross   [] CBC [] Units PRBC   [] BMP/Mg [] Units FFP   [] Venogram [] Cardiac CTA for Pulmonary vein mapping     RN INITIALS: SR    Patient Instructions  Do not eat or drink after midnight the night prior to procedure  Dx:Persistent AF  ICD-10 code: I48.19
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
## 2023-03-01 NOTE — DISCHARGE INSTR - COC
Continuity of Care Form    Patient Name: Laci Vargas   :    MRN:  [id number]    Admit date:  3/1/2023  Discharge date:  ***    Code Status Order: Prior   Advance Directives:     Admitting Physician:  No admitting provider for patient encounter.   PCP: Charlies Cushing, MD    Discharging Nurse: [facility] Unit/Room#: [facility] 6/Wilson-06  Discharging Unit Phone Number: ***    Emergency Contact:   Extended Emergency Contact Information  Primary Emergency Contact: Jenelle Mandujano  Address: [address]           [place], [employer] [address] Phone: [phone]  Relation: Spouse  Secondary Emergency Contact: Jenelle Mandujano  Address: [address]           [place], [place] [address]  Home Phone: [phone]  Mobile Phone: [phone]  Relation: Spouse    Past Surgical History:  Past Surgical History:   Procedure Laterality Date    SHOULDER SURGERY         Immunization History:   Immunization History   Administered Date(s) Administered    COVID-19, PFIZER PURPLE top, DILUTE for use, (age 15 y+), 30mcg/0.3mL 2021, 2021       Active Problems:  Patient Active Problem List   Diagnosis Code    Headache, chronic daily R51.9    HTN (hypertension) I10    Intracranial bleed (HCC) I62.9    CVA (cerebrovascular accident due to intracerebral hemorrhage) ([facility] [address] I61.9       Isolation/Infection:   Isolation            No Isolation          Patient Infection Status       None to display            Nurse Assessment:  Last Vital Signs: BP (!) 159/103   Pulse (!) 177   Temp 98 °F (36.7 °C)   Resp 18   Ht 6' (1.829 m)   Wt 177 lb (80.3 kg)   SpO2 94%   BMI 24.01 kg/m²     Last documented pain score (0-10 scale):    Last Weight:   Wt Readings from Last 1 Encounters:   23 177 lb (80.3 kg)     Mental Status:  {IP PT MENTAL STATUS:}    IV Access:  [facility] IV ACCESS:007512466}    Nursing Mobility/ADLs:  Walking   {[facility] RDXK:769339770}  Transfer  {[facility] DME QCQP:104243934}  Bathing  {CHP DME KUNP:160513296}  Dressing  {CHP DME FXES:810249342}  Toileting  {CHP DME DOZK:314962696}  Feeding  {CHP DME NAFL:528555556}  Med Admin  {CHP DME DILK:164369977}  Med Delivery   { [facility] MED Delivery:319587992}    Wound Care Documentation and Therapy:        Elimination:  Continence: Bowel: {YES / EU:39552}  Bladder: {YES / AM:72799}  Urinary Catheter: {Urinary Catheter:577773147}   Colostomy/Ileostomy/Ileal Conduit: {YES / PO:16465}       Date of Last BM: ***  No intake or output data in the 24 hours ending 23 1243  No intake/output data recorded.     Safety Concerns:     [employer] Safety Concerns:177489614}    Impairments/Disabilities:      [employer] Impairments/Disabilities:531435094}    Nutrition Therapy:  Current Nutrition Therapy:   [employer] Diet List:644683523}    Routes of Feeding: {CHP DME Other Feedings:995482392}  Liquids: {Slp liquid thickness:17689}  Daily Fluid Restriction: {CHP DME Yes amt example:006717322}  Last Modified Barium Swallow with Video (Video Swallowing Test): {Done Not Done SBJC:619144444}    Treatments at the Time of Hospital Discharge:   Respiratory Treatments: ***  Oxygen Therapy:  {Therapy; copd oxygen:31114}  Ventilator:    {[facility] Vent VTCJ:259071250}    Rehab Therapies: {THERAPEUTIC INTERVENTION:3271817054}  Weight Bearing Status/Restrictions: [address] [employer] Weight Bearin}  Other Medical Equipment (for information only, NOT a DME order):  {EQUIPMENT:230916332}  Other Treatments: ***    Patient's personal belongings (please select all that are sent with patient):  {CHP DME Belongings:164359454}    RN SIGNATURE:  {Esignature:931030078}    CASE MANAGEMENT/SOCIAL WORK SECTION    Inpatient Status Date: ***    Readmission Risk Assessment Score:  Readmission Risk              Risk of Unplanned Readmission:  0           Discharging to Facility/ Agency   Name:   Address:  Phone:  Fax:    Dialysis Facility (if applicable)   Name:  Address:  Dialysis Schedule:  Phone:  Fax:    / signature: {Esignature:717886835}    PHYSICIAN SECTION    Prognosis: {Prognosis:2903918509}    Condition at Discharge: Sonia Nleson Patient Condition:640992663}    Rehab Potential (if transferring to Rehab): {Prognosis:4118124399}    Recommended Labs or Other Treatments After Discharge: ***    Physician Certification: I certify the above information and transfer of [place]  is necessary for the continuing treatment of the diagnosis listed and that he requires {Admit to Appropriate Level of Care:03159} for {GREATER/LESS:684728116} 30 days.      Update Admission H&P: {CHP DME Changes in OQYAK:805286959}    PHYSICIAN SIGNATURE:  {Esignature:814255354}

## 2023-03-01 NOTE — H&P
HOSPITALISTS HISTORY AND PHYSICAL    3/1/2023 5:30 PM    Patient Information:  SAMUEL MANDUJANO is a 67 y.o. male [id number]  PCP:  Masood Devries MD (Tel: [phone] )    Chief complaint:    Chief Complaint   Patient presents with    Shortness of Breath     Complaints of shortness of breath x1 week with ambulation, states coughing up blood tinged sputum for a few days, presents with a HR of 150-170, only hx of HTN        History of Present Illness:  Samuel Mandujano is a 67 y.o. male who presented with complaints of chest pain and shortness of breath.  Onset of symptoms about a week ago.  Patient having trouble walking.  Gets shortness of breath discharged with shortness of breath  Blood-tinged sputum.  Few days ago.  Patient states shortness of came back again yesterday this morning was worse.  He does endorse decreased exercise tolerance over the past week.  No significant history of any heart disease that he knows had COVID vaccines.  Denies any fevers chills no history of blood clot.  REVIEW OF SYSTEMS:   Constitutional: Negative for fever,chills or night sweats  ENT: Negative for rhinorrhea, epistaxis, hoarseness, sore throat.  Respiratory: Negative for shortness of breath,wheezing  Cardiovascular: Negative for chest pain, palpitations   Gastrointestinal: Negative for nausea, vomiting, diarrhea  Genitourinary: Negative for polyuria, dysuria   Hematologic/Lymphatic: Negative for bleeding tendency, easy bruising  Musculoskeletal: Negative for myalgias and arthralgias  Neurologic: Negative for confusion,dysarthria.  Skin: Negative for itching,rash, good capillary refill.   Psychiatric: Negative for depression,anxiety, agitation.  Endocrine: Negative for polydipsia,polyuria,heat /cold intolerance.    Past Medical History:   has a past medical history of Headache, Hypertension, Intracerebral hemorrhage (HCC), Neck pain, and Unspecified cerebral artery  occlusion with cerebral infarction. Past Surgical History:   has a past surgical history that includes shoulder surgery. Medications:  No current facility-administered medications on file prior to encounter. Current Outpatient Medications on File Prior to Encounter   Medication Sig Dispense Refill    propranolol (INDERAL) 20 MG tablet Take 20 mg by mouth daily      Glucosamine HCl (GLUCOSAMINE PO) Take 1,000 mg by mouth daily      fluticasone (FLONASE) 50 MCG/ACT nasal spray 2 sprays by Each Nostril route daily      oxyCODONE (OXY-IR) 15 MG immediate release tablet Take 15 mg by mouth 3 times daily as needed for Pain. diclofenac sodium (VOLTAREN) 1 % GEL Apply 4 g topically 4 times daily (Patient not taking: Reported on 3/1/2023) 5 Tube 0    clonazePAM (KLONOPIN) 0.5 MG tablet Take 1 tablet by mouth 2 times daily (Patient not taking: Reported on 3/1/2023) 60 tablet 2    nystatin (MYCOSTATIN) 080120 UNIT/GM cream Apply topically 2 times daily.  (Patient not taking: Reported on 3/1/2023) 30 g 1    Avanafil (STENDRA) 200 MG TABS Take 1 tablet by mouth daily (Patient not taking: Reported on 3/1/2023) 6 tablet 1    omeprazole (PRILOSEC) 20 MG delayed release capsule TAKE 1 CAPSULE BY MOUTH DAILY (Patient taking differently: Take 20 mg by mouth Daily) 90 capsule 3    atorvastatin (LIPITOR) 40 MG tablet TAKE 1 TABLET BY MOUTH EVERY DAY (Patient not taking: No sig reported) 90 tablet 3    lisinopril (PRINIVIL;ZESTRIL) 20 MG tablet TAKE 1 TABLET BY MOUTH DAILY (Patient not taking: Reported on 3/1/2023) 90 tablet 3    amLODIPine (NORVASC) 10 MG tablet TAKE 1 TABLET BY MOUTH DAILY 90 tablet 3    sildenafil (VIAGRA) 100 MG tablet Take 1 tablet by mouth as needed for Erectile Dysfunction (Patient not taking: Reported on 3/1/2023) 6 tablet 3    tadalafil (CIALIS) 5 MG tablet One po daily for erectile dysfunction (Patient not taking: Reported on 3/1/2023) 30 tablet 2       Allergies:  No Known Allergies     Social History:   reports that he has never smoked. He has never used smokeless tobacco. He reports current alcohol use. He reports that he does not use drugs. Family History:  family history includes Cancer in his mother; Diabetes in his father and sister. ,     Physical Exam:  BP (!) 151/107   Pulse (!) 115   Temp 98 °F (36.7 °C)   Resp 18   Ht 6' (1.829 m)   Wt 177 lb (80.3 kg)   SpO2 (!) 83%   BMI 24.01 kg/m²     General appearance:  Appears comfortable. Well nourished  Eyes: Sclera clear, pupils equal  ENT: Moist mucus membranes, no thrush. Trachea midline. Cardiovascular: Regular rhythm, normal S1, S2. No murmur, gallop, rub. No edema in lower extremities  Respiratory: Clear to auscultation bilaterally, no wheeze, good inspiratory effort  Gastrointestinal: Abdomen soft, non-tender, not distended, normal bowel sounds  Musculoskeletal: No cyanosis in digits, neck supple  Neurology: Cranial nerves grossly intact. Alert and oriented in time, place and person. No speech or motor deficits  Psychiatry: Appropriate affect.  Not agitated  Skin: Warm, dry, normal turgor, no rash    Labs:  CBC:   Lab Results   Component Value Date/Time    WBC 8.8 03/01/2023 12:33 PM    RBC 4.10 03/01/2023 12:33 PM    HGB 12.5 03/01/2023 12:33 PM    HCT 37.1 03/01/2023 12:33 PM    MCV 90.6 03/01/2023 12:33 PM    MCH 30.5 03/01/2023 12:33 PM    MCHC 33.7 03/01/2023 12:33 PM    RDW 13.4 03/01/2023 12:33 PM     03/01/2023 12:33 PM    MPV 10.3 03/01/2023 12:33 PM     BMP:    Lab Results   Component Value Date/Time     03/01/2023 12:33 PM    K 4.0 03/01/2023 12:33 PM     03/01/2023 12:33 PM    CO2 23 03/01/2023 12:33 PM    BUN 20 03/01/2023 12:33 PM    CREATININE 1.1 03/01/2023 12:33 PM    CALCIUM 9.2 03/01/2023 12:33 PM    GFRAA >60 05/24/2022 11:06 AM    GFRAA 109 06/14/2013 08:26 PM    LABGLOM >60 03/01/2023 12:33 PM    GLUCOSE 111 03/01/2023 12:33 PM       Chest Xray:   EKG:    I visualized CXR images and EKG strips Discussed  with     Problem List  Principal Problem:    A-fib ([address]  Resolved Problems:    * No resolved hospital problems. *        Assessment/Plan:     A-fib with RVR  -New onset. -Clear etiology given progressive shortness of breath over the weeks highly concerning for cardiac  -Will be admitted for further evaluation currently on Cardizem drip  -Cardiogram has been ordered      -Possible.   Patient will be started on empiric antibiotic  -Rapid flu was negative COVID-19 was negative      Sonia Rob MD    3/1/2023 5:30 PM

## 2023-03-01 NOTE — ED NOTES
Writer spoke with 3a inpatient nurse at this time. Nurse to be down shortly.        Jadyn Hutton, RN  03/01/23 028

## 2023-03-02 ENCOUNTER — APPOINTMENT (OUTPATIENT)
Dept: CT IMAGING | Age: 68
DRG: 246 | End: 2023-03-02
Payer: MEDICARE

## 2023-03-02 LAB
ANION GAP SERPL CALCULATED.3IONS-SCNC: 9 MMOL/L (ref 3–16)
BUN BLDV-MCNC: 23 MG/DL (ref 7–20)
CALCIUM SERPL-MCNC: 9.3 MG/DL (ref 8.3–10.6)
CHLORIDE BLD-SCNC: 107 MMOL/L (ref 99–110)
CO2: 21 MMOL/L (ref 21–32)
CREAT SERPL-MCNC: 1.2 MG/DL (ref 0.8–1.3)
GFR SERPL CREATININE-BSD FRML MDRD: >60 ML/MIN/{1.73_M2}
GLUCOSE BLD-MCNC: 106 MG/DL (ref 70–99)
HCT VFR BLD CALC: 36.7 % (ref 40.5–52.5)
HEMOGLOBIN: 12.4 G/DL (ref 13.5–17.5)
LV EF: 25 %
LVEF MODALITY: NORMAL
MCH RBC QN AUTO: 30.7 PG (ref 26–34)
MCHC RBC AUTO-ENTMCNC: 33.9 G/DL (ref 31–36)
MCV RBC AUTO: 90.6 FL (ref 80–100)
PDW BLD-RTO: 13.6 % (ref 12.4–15.4)
PLATELET # BLD: 213 K/UL (ref 135–450)
PMV BLD AUTO: 10.6 FL (ref 5–10.5)
POTASSIUM SERPL-SCNC: 4.4 MMOL/L (ref 3.5–5.1)
PROCALCITONIN: 0.07 NG/ML (ref 0–0.15)
RBC # BLD: 4.05 M/UL (ref 4.2–5.9)
SODIUM BLD-SCNC: 137 MMOL/L (ref 136–145)
TSH SERPL DL<=0.05 MIU/L-ACNC: 0.58 UIU/ML (ref 0.27–4.2)
WBC # BLD: 9.8 K/UL (ref 4–11)

## 2023-03-02 PROCEDURE — 80048 BASIC METABOLIC PNL TOTAL CA: CPT

## 2023-03-02 PROCEDURE — 94760 N-INVAS EAR/PLS OXIMETRY 1: CPT

## 2023-03-02 PROCEDURE — 84145 PROCALCITONIN (PCT): CPT

## 2023-03-02 PROCEDURE — 84443 ASSAY THYROID STIM HORMONE: CPT

## 2023-03-02 PROCEDURE — 36415 COLL VENOUS BLD VENIPUNCTURE: CPT

## 2023-03-02 PROCEDURE — 70450 CT HEAD/BRAIN W/O DYE: CPT

## 2023-03-02 PROCEDURE — 99223 1ST HOSP IP/OBS HIGH 75: CPT | Performed by: INTERNAL MEDICINE

## 2023-03-02 PROCEDURE — 6360000002 HC RX W HCPCS: Performed by: NURSE PRACTITIONER

## 2023-03-02 PROCEDURE — 85027 COMPLETE CBC AUTOMATED: CPT

## 2023-03-02 PROCEDURE — 6360000002 HC RX W HCPCS: Performed by: INTERNAL MEDICINE

## 2023-03-02 PROCEDURE — 2500000003 HC RX 250 WO HCPCS: Performed by: HOSPITALIST

## 2023-03-02 PROCEDURE — 6370000000 HC RX 637 (ALT 250 FOR IP): Performed by: NURSE PRACTITIONER

## 2023-03-02 PROCEDURE — 6370000000 HC RX 637 (ALT 250 FOR IP): Performed by: INTERNAL MEDICINE

## 2023-03-02 PROCEDURE — 93306 TTE W/DOPPLER COMPLETE: CPT

## 2023-03-02 PROCEDURE — 1200000000 HC SEMI PRIVATE

## 2023-03-02 PROCEDURE — 2580000003 HC RX 258: Performed by: HOSPITALIST

## 2023-03-02 PROCEDURE — 2580000003 HC RX 258: Performed by: NURSE PRACTITIONER

## 2023-03-02 RX ORDER — ENOXAPARIN SODIUM 100 MG/ML
1 INJECTION SUBCUTANEOUS 2 TIMES DAILY
Status: DISCONTINUED | OUTPATIENT
Start: 2023-03-02 | End: 2023-03-04

## 2023-03-02 RX ORDER — METOPROLOL SUCCINATE 25 MG/1
25 TABLET, EXTENDED RELEASE ORAL ONCE
Status: COMPLETED | OUTPATIENT
Start: 2023-03-02 | End: 2023-03-02

## 2023-03-02 RX ORDER — FUROSEMIDE 40 MG/1
40 TABLET ORAL 2 TIMES DAILY
Status: DISCONTINUED | OUTPATIENT
Start: 2023-03-02 | End: 2023-03-07

## 2023-03-02 RX ORDER — FUROSEMIDE 10 MG/ML
20 INJECTION INTRAMUSCULAR; INTRAVENOUS ONCE
Status: COMPLETED | OUTPATIENT
Start: 2023-03-02 | End: 2023-03-02

## 2023-03-02 RX ORDER — METOPROLOL SUCCINATE 50 MG/1
50 TABLET, EXTENDED RELEASE ORAL DAILY
Status: DISCONTINUED | OUTPATIENT
Start: 2023-03-02 | End: 2023-03-03

## 2023-03-02 RX ORDER — CLONAZEPAM 1 MG/1
0.5 TABLET ORAL EVERY 12 HOURS PRN
Status: DISCONTINUED | OUTPATIENT
Start: 2023-03-02 | End: 2023-03-07 | Stop reason: HOSPADM

## 2023-03-02 RX ORDER — ENOXAPARIN SODIUM 100 MG/ML
1 INJECTION SUBCUTANEOUS NIGHTLY
Status: DISCONTINUED | OUTPATIENT
Start: 2023-03-02 | End: 2023-03-02 | Stop reason: DRUGHIGH

## 2023-03-02 RX ADMIN — Medication 10 ML: at 20:28

## 2023-03-02 RX ADMIN — FUROSEMIDE 20 MG: 10 INJECTION, SOLUTION INTRAMUSCULAR; INTRAVENOUS at 12:26

## 2023-03-02 RX ADMIN — ENOXAPARIN SODIUM 80 MG: 100 INJECTION SUBCUTANEOUS at 16:49

## 2023-03-02 RX ADMIN — Medication 7.5 MG/HR: at 14:53

## 2023-03-02 RX ADMIN — METOPROLOL SUCCINATE 25 MG: 25 TABLET, EXTENDED RELEASE ORAL at 20:28

## 2023-03-02 RX ADMIN — Medication 10 ML: at 09:43

## 2023-03-02 RX ADMIN — METOPROLOL SUCCINATE 50 MG: 50 TABLET, EXTENDED RELEASE ORAL at 16:49

## 2023-03-02 RX ADMIN — FUROSEMIDE 40 MG: 40 TABLET ORAL at 16:49

## 2023-03-02 RX ADMIN — CEFTRIAXONE SODIUM 1000 MG: 1 INJECTION, POWDER, FOR SOLUTION INTRAMUSCULAR; INTRAVENOUS at 18:30

## 2023-03-02 NOTE — PLAN OF CARE
Problem: Discharge Planning  Goal: Discharge to home or other facility with appropriate resources  3/1/2023 2133 by Joseph Fostre RN  Outcome: Progressing  3/1/2023 2132 by Joseph Foster RN  Outcome: Progressing     Problem: Safety - Adult  Goal: Free from fall injury  3/1/2023 2133 by Joseph Foster RN  Outcome: Progressing  3/1/2023 2132 by Joseph Foster RN  Outcome: Progressing     Problem: Cardiovascular - Adult  Goal: Maintains optimal cardiac output and hemodynamic stability  3/1/2023 2133 by Joseph Foster RN  Outcome: Progressing  3/1/2023 2132 by Joseph Foster RN  Outcome: Progressing  Goal: Absence of cardiac dysrhythmias or at baseline  3/1/2023 2133 by Joseph Foster RN  Outcome: Progressing  3/1/2023 2132 by Joseph Foster RN  Outcome: Progressing

## 2023-03-02 NOTE — CONSULTS
[address] [place] [address]   Electrophysiology   Date: 3/2/2023  Reason for Consultation: Atrial fibrillation with RVR    Consult Requesting Physician: Minal Terry MD     Chief Complaint   Patient presents with    Shortness of Breath     Complaints of shortness of breath x1 week with ambulation, states coughing up blood tinged sputum for a few days, presents with a HR of 150-170, only hx of HTN       CC: Shortness of breath    HPI: Rima Hernandez is a 79 y.o. male who has presented to the hospital with shortness of breath and hemoptysis. He started having shortness of breath about a week ago. He had orthopnea and could not sleep. He also had some cough with blood-tinged sputum. He was not aware of his irregular fast heart rhythm. Reports no prior history of cardiac issues. Presented to the emergency room and found to be in atrial fibrillation with rapid ventricular response. CT with no PE and mild infiltrate of LL. He has been receiving IV diltiazem and antibiotic therapy for possible pneumonia. Patient reports history of remote smoking. No prior history of chest pain. He states that his primary care told him that his heart rhythm was irregular in the past.  Reports no history of prior atrial fibrillation. He has history of CVA. He also has had history of right parietal parenchymal bleed x2 in 2013 admitted to [facility] with no intervention. Assessment:   Atrial fibrillation with rapid ventricular response, new diagnosis  Acute HFrEF, new diagnosis  Moderate to severe mitral regurgitation, new diagnosis  Possible pneumonia  History of CVA  History of ICH    Plan:   Echocardiography reviewed by me and showed severe LV dysfunction with regional wall motion abnormality, dilated left atrium, moderate to severe MR. Toprol XL PO and wean off Cardizem due to negative inotropic effect. Treatment options including ARAMIS/cardioversion discussed with the patient.   Risk-benefit alternative discussed. He has history of ICH in the past. Long term anticoagulation has risk of ICH. If ARAMIS/Cardioversion short term anticoagulation is recommended +/- CHENCHO occlusion. However since he has new onset cardiomyopathy prefer to do cardiac angiography prior to attempt for ARAMIS cardioversion. Interventional cardiology consult for possible cardiac catheterization  High [id number] score, high risk for stroke/thromboembolism. Increase the dose of Lovenox therapeutic dose. IV diuretics. Lasix   Close monitoring of volume status, electrolytes(K, NA), renal function   Strict I/Os  Weight dialy. He is receiving antibiotic therapy however, pro calcitonin is low. High risk for recurrent atrial arrhythmia including atrial fibrillation due to enlarged LA size and cardiomyopathy and MR. Severe exacerbation of HFrEF leading to hospitalization    Discussed with nursing staff. Discussed with referring physician. Relevant available labs, and cardiovascular diagnostics, documents reviewed. ECG: Atrial fibrillation with RVR   Echo: none   Stress test:   CXR:  unremarkable     Troponin < 0.01   K: 4.4   Pro-BNP: 10,110   Pro-Calcitonin: 0.07   Hgb: 12.4       I independently reviewed cardiac tracings / rhythm ECGs strips: noted atrial fibrillation with RVR    Reviewed ECGs. Severe exacerbation of underlying medical condition requiring hospitalization and at risk of decompensation. I independently reviewed relevant and available cardiac diagnostic tests.      Scheduled Meds:   cefTRIAXone (ROCEPHIN) IV  1,000 mg IntraVENous Q24H    sodium chloride flush  5-40 mL IntraVENous 2 times per day    enoxaparin  40 mg SubCUTAneous Nightly     Continuous Infusions:   dilTIAZem 5 mg/hr (03/01/23 1804)    sodium chloride       PRN Meds:.oxyCODONE, perflutren lipid microspheres, sodium chloride flush, sodium chloride, ondansetron **OR** ondansetron, polyethylene glycol, acetaminophen **OR** acetaminophen Prior to Admission medications    Medication Sig Start Date End Date Taking? Authorizing Provider   propranolol (INDERAL) 20 MG tablet Take 20 mg by mouth daily   Yes Historical Provider, MD   Glucosamine HCl (GLUCOSAMINE PO) Take 1,000 mg by mouth daily   Yes Historical Provider, MD   fluticasone (FLONASE) 50 MCG/ACT nasal spray 2 sprays by Each Nostril route daily   Yes Historical Provider, MD   oxyCODONE (OXY-IR) 15 MG immediate release tablet Take 15 mg by mouth 3 times daily as needed for Pain. Yes Historical Provider, MD   diclofenac sodium (VOLTAREN) 1 % GEL Apply 4 g topically 4 times daily  Patient not taking: Reported on 3/1/2023 5/6/20 6/5/20  ANANTH Deng   clonazePAM (KLONOPIN) 0.5 MG tablet Take 1 tablet by mouth 2 times daily  Patient not taking: Reported on 3/1/2023 10/5/17   Jorge Cheema MD   nystatin (MYCOSTATIN) 324428 UNIT/GM cream Apply topically 2 times daily.   Patient not taking: Reported on 3/1/2023 10/5/17   Jorge Cheema MD   Avanafil (STENDRA) 200 MG TABS Take 1 tablet by mouth daily  Patient not taking: Reported on 3/1/2023 10/5/17   Jorge Cheema MD   omeprazole (PRILOSEC) 20 MG delayed release capsule TAKE 1 CAPSULE BY MOUTH DAILY  Patient taking differently: Take 20 mg by mouth Daily 10/5/17   Jorge Cheema MD   atorvastatin (LIPITOR) 40 MG tablet TAKE 1 TABLET BY MOUTH EVERY DAY 10/5/17   Jorge Cheema MD   lisinopril (PRINIVIL;ZESTRIL) 20 MG tablet TAKE 1 TABLET BY MOUTH DAILY  Patient not taking: Reported on 3/1/2023 10/5/17   Jorge Cheema MD   amLODIPine (NORVASC) 10 MG tablet TAKE 1 TABLET BY MOUTH DAILY 10/5/17   Jorge Cheema MD   sildenafil (VIAGRA) 100 MG tablet Take 1 tablet by mouth as needed for Erectile Dysfunction  Patient not taking: Reported on 3/1/2023 6/9/17   Jorge Cheema MD   tadalafil (CIALIS) 5 MG tablet One po daily for erectile dysfunction  Patient not taking: Reported on 3/1/2023 1/22/16   Jorge Cheema MD Physical Examination:  Vitals:    23 0600   BP:    Pulse: (!) 126   Resp:    Temp:    SpO2:       In: -   Out: 200    Wt Readings from Last 3 Encounters:   23 170 lb (77.1 kg)   22 165 lb (74.8 kg)   20 178 lb (80.7 kg)     Temp  Av.7 °F (36.5 °C)  Min: 97.3 °F (36.3 °C)  Max: 98 °F (36.7 °C)  Pulse  Av.2  Min: 83  Max: 177  BP  Min: 92/59  Max: 159/103  SpO2  Av %  Min: 83 %  Max: 95 %    Intake/Output Summary (Last 24 hours) at 3/2/2023 0785  Last data filed at 3/2/2023 0350  Gross per 24 hour   Intake --   Output 200 ml   Net -200 ml       Constitutional: Oriented. No distress. Cardiovascular: Tachycardic rate, Irregular rhythm, S1&S2. Pulmonary/Chest: Bilateral respiratory sounds. No rhonchi. Abdominal: Soft. No tenderness   Musculoskeletal: No edema    Neurological: Alert and oriented. Follows command    Active Hospital Problems    Diagnosis Date Noted    A-fib [facility]) [I48.91] 2023     Priority: Medium       Past Medical History:   Diagnosis Date    Headache     Hypertension     Intracerebral hemorrhage ([facility] [address] 2013    Recurrent 13 - Right occipitoparietal    Neck pain     Unspecified cerebral artery occlusion with cerebral infarction         Past Surgical History:   Procedure Laterality Date    SHOULDER SURGERY         No Known Allergies     reports that he has never smoked. He has never used smokeless tobacco. He reports that he does not currently use alcohol. He reports that he does not use drugs. All questions and concerns were addressed to the patient/family. Alternatives to my treatment were discussed. I have discussed the above stated plan and the patient verbalized understanding and agreed with the plan. NOTE: This report was transcribed using voice recognition software. Every effort was made to ensure accuracy, however, inadvertent computerized transcription errors may be present.      Collin Wade MD, MPH  [facility] Office: [phone]  Fax: [phone]

## 2023-03-02 NOTE — PROGRESS NOTES
[facility] PROGRESS NOTE    3/2/2023 7:50 AM        Name: Korina Mazariegos . Admitted: 3/1/2023  Primary Care Provider: Samir Smallwood MD (Tel: [phone])      Subjective:   Korina Mazariegos is a 79 y.o. male with a past medical history of hypertension, ICA 8/2013 right occipitoparietal who presented with chest pain and SOB x 1 week. Reported having trouble walking. Admits to blood-tinged sputum a few days ago. Covid negative and flu negative    Interval History: Today he continues with SOB and productive pink-tinged clear sputum. Remains in AF with RVR rates 110-120's on telemetry. He has 3 large birds and wants to get home. Denies CP today. No s/s of bleeding. Admits to hx of 2 ICH in the past.     Independently reviewed interval ancillary notes from cardiology. Problem List  Principal Problem:    A-fib [facility])  Resolved Problems:    * No resolved hospital problems. *    Assessment and Plan:    Atrial Fibrillation RVR   - Remains in AF with RVR on ECG and telemetry    - Continue Cardizem gtt   - Hx of ICH, no AC at this time   - echo pending   - Cardiology consultation, appreciate recs  SOB  - Etiology likely multifactorial, Left Lower Lobe infiltrate by CT, AF/RVR    - On empirical abx therapy   - No leukocytosis, will add procal   - pink tinged/clear sputum, BNP elevated can be due to tachycardia  ~ One time lasix 20 mg IV, sputum culture, - - continue IV abx and follow labs  Hypertension   - BP has been labile, home medications have been held while on diltiazem gtt  Hx of ICA   - Hx of ICH 5/2013 and again 8/2013 in same region   - ? Candidacy for [facility], will discuss with cards, hold for now    Discussed case with cardiology and plans for [facility], request CT head with hx of ICH. Echo grossly abnormal with severe LV dysfunction and moderate to severe mitral disease (full report pending). Possible ARAMIS/DCCV later this admission. Toprol added and wean diltiazem. Cards added IV lasix    Discussed care with patient and nursing  Discussed assessment, diagnostics, and plan of care with Dr. Kierra Geronimo, continue IV diuresis, repeat CCXR in am and stop abx therapy if improved     Diet: ADULT DIET;  Regular  Code:Full Code  DVT PPX: Lovenox PPx     Disposition: Home when medically able, denies physical limitation or concerns    Current Medications  dilTIAZem HCl-Sodium Chloride 125 mg / 125 mL infusion, Continuous  oxyCODONE (OXY-IR) immediate release tablet 15 mg, TID PRN  perflutren lipid microspheres (DEFINITY) injection 1.5 mL, ONCE PRN  cefTRIAXone (ROCEPHIN) 1,000 mg in sodium chloride 0.9 % 50 mL IVPB (mini-bag), Q24H  sodium chloride flush 0.9 % injection 5-40 mL, 2 times per day  sodium chloride flush 0.9 % injection 5-40 mL, PRN  0.9 % sodium chloride infusion, PRN  enoxaparin (LOVENOX) injection 40 mg, Nightly  ondansetron (ZOFRAN-ODT) disintegrating tablet 4 mg, Q8H PRN   Or  ondansetron (ZOFRAN) injection 4 mg, Q6H PRN  polyethylene glycol (GLYCOLAX) packet 17 g, Daily PRN  acetaminophen (TYLENOL) tablet 650 mg, Q6H PRN   Or  acetaminophen (TYLENOL) suppository 650 mg, Q6H PRN        Objective:  BP (!) 131/92   Pulse (!) 126   Temp 97.5 °F (36.4 °C) (Oral)   Resp 20   Ht 6' (1.829 m)   Wt 170 lb (77.1 kg)   SpO2 93%   BMI 23.06 kg/m²   Vitals:    03/02/23 0600   BP:    Pulse: (!) 126   Resp:    Temp:    SpO2:        Intake/Output Summary (Last 24 hours) at 3/2/2023 0750  Last data filed at 3/2/2023 0350  Gross per 24 hour   Intake --   Output 200 ml   Net -200 ml      Wt Readings from Last 3 Encounters:   03/02/23 170 lb (77.1 kg)   05/24/22 165 lb (74.8 kg)   05/06/20 178 lb (80.7 kg)     Review of Systems:  Constitutional: Negative for fever, weight changes, or weakness  Skin: Negative for bruising, bleeding, blood clots, or changes in skin pigment  HEENT: Negative for vision changes or dysphagia  Respiratory: Reviewed in HPI  Cardiovascular: Reviewed in HPI  Gastrointestinal: Negative for abdominal pain, N/V/D, constipation, or black/tarry stools  Genito-Urinary: Negative for hematuria  Musculoskeletal: No focal weakness  Neurological/Psych: Negative for confusion or TIA-like symptoms. No anxiety, depression, or insomnia    Physical Examination:  Telemetry: Personally Reviewed Atrial fibrillation w/ RVR  Constitutional: Cooperative and in no apparent distress, appears well nourished, No obesity  Skin: Warm and pink; no cyanosis, bruising, or clubbing, No lesions/incisions  HEENT: Symmetric and normocephalic. Conjunctiva pink with clear sclera. Mucus membranes pink and moist.   Cardiovascular: irregular rate and rhythm. S1 & S2, positive for murmurs. Peripheral pulses 2+, No peripheral edema + tachycardia  Respiratory: Respirations symmetric and unlabored. Lungs clear to auscultation bilaterally, no wheezing, crackles, or rhonchi + diminished  Gastrointestinal: Abdomen soft and round. normal bowel sounds. No tenderness  Musculoskeletal: No focal weakness, muscle strength 5/5 bilaterally  Neurologic/Psych: Awake and orientated to person, place and time. Calm affect, appropriate mood. Pertinent labs, diagnostic, and imaging results reviewed as a part of this visit    Labs and Tests:  CBC:   Recent Labs     03/01/23  1233 03/02/23  0423   WBC 8.8 9.8   HGB 12.5* 12.4*    213     BMP:    Recent Labs     03/01/23  1233 03/02/23  0423    137   K 4.0 4.4    107   CO2 23 21   BUN 20 23*   CREATININE 1.1 1.2   GLUCOSE 111* 106*     Hepatic:   Recent Labs     03/01/23  1233   AST 22   ALT 18   BILITOT 1.0   ALKPHOS 120     Relevant results:  CXR: none    CTPE: 3/1/2023  No evidence of pulmonary embolism. Mild infiltrate noted in the the left lower lobe, which may represent   pneumonia.      ECG: 3/1/2023  AF with RVR    Echo: 3/2/2023  Pending    GOLDEN Suggs - CNP   3/2/2023 7:50 AM

## 2023-03-02 NOTE — CARE COORDINATION
Discharge Planning:     (CM) reviewed the patient's chart to assess needs. Patient's Readmission Risk Score is 7%. Patient's medical insurance is [facility] Medicare. Patient's PCP is Dr. Radha Arthur. No needs anticipated, at this time. CM team to follow. Staff to inform CM if additional discharge needs arise.         Pauline BAIRD, YOLI, [facility] -   [phone]    Electronically signed by BRIE Treviño on 3/2/2023 at 8:34 AM

## 2023-03-03 ENCOUNTER — APPOINTMENT (OUTPATIENT)
Dept: GENERAL RADIOLOGY | Age: 68
DRG: 246 | End: 2023-03-03
Payer: MEDICARE

## 2023-03-03 PROBLEM — Z86.73 HISTORY OF CVA (CEREBROVASCULAR ACCIDENT): Status: ACTIVE | Noted: 2023-03-03

## 2023-03-03 PROBLEM — J18.9 COMMUNITY ACQUIRED PNEUMONIA: Status: ACTIVE | Noted: 2023-03-03

## 2023-03-03 PROBLEM — I50.21 ACUTE SYSTOLIC HEART FAILURE (HCC): Status: ACTIVE | Noted: 2023-03-03

## 2023-03-03 PROBLEM — I34.0 MODERATE TO SEVERE MITRAL REGURGITATION: Status: ACTIVE | Noted: 2023-03-03

## 2023-03-03 LAB
ANION GAP SERPL CALCULATED.3IONS-SCNC: 10 MMOL/L (ref 3–16)
BUN BLDV-MCNC: 21 MG/DL (ref 7–20)
CALCIUM SERPL-MCNC: 9.2 MG/DL (ref 8.3–10.6)
CHLORIDE BLD-SCNC: 105 MMOL/L (ref 99–110)
CO2: 25 MMOL/L (ref 21–32)
CREAT SERPL-MCNC: 1.3 MG/DL (ref 0.8–1.3)
GFR SERPL CREATININE-BSD FRML MDRD: >60 ML/MIN/{1.73_M2}
GLUCOSE BLD-MCNC: 136 MG/DL (ref 70–99)
HCT VFR BLD CALC: 35.1 % (ref 40.5–52.5)
HEMOGLOBIN: 11.7 G/DL (ref 13.5–17.5)
LEFT VENTRICULAR EJECTION FRACTION MODE: NORMAL
LV EF: 20 %
MCH RBC QN AUTO: 30 PG (ref 26–34)
MCHC RBC AUTO-ENTMCNC: 33.2 G/DL (ref 31–36)
MCV RBC AUTO: 90.3 FL (ref 80–100)
PDW BLD-RTO: 13.6 % (ref 12.4–15.4)
PLATELET # BLD: 190 K/UL (ref 135–450)
PMV BLD AUTO: 9.6 FL (ref 5–10.5)
POC ACT LR: 154 SEC
POC ACT LR: 216 SEC
POC ACT LR: 257 SEC
POC ACT LR: 293 SEC
POTASSIUM SERPL-SCNC: 4 MMOL/L (ref 3.5–5.1)
RBC # BLD: 3.89 M/UL (ref 4.2–5.9)
SODIUM BLD-SCNC: 140 MMOL/L (ref 136–145)
TSH REFLEX: 0.47 UIU/ML (ref 0.27–4.2)
WBC # BLD: 8.6 K/UL (ref 4–11)

## 2023-03-03 PROCEDURE — 2500000003 HC RX 250 WO HCPCS

## 2023-03-03 PROCEDURE — 6370000000 HC RX 637 (ALT 250 FOR IP): Performed by: INTERNAL MEDICINE

## 2023-03-03 PROCEDURE — 4A023N7 MEASUREMENT OF CARDIAC SAMPLING AND PRESSURE, LEFT HEART, PERCUTANEOUS APPROACH: ICD-10-PCS | Performed by: INTERNAL MEDICINE

## 2023-03-03 PROCEDURE — 2500000003 HC RX 250 WO HCPCS: Performed by: HOSPITALIST

## 2023-03-03 PROCEDURE — B2151ZZ FLUOROSCOPY OF LEFT HEART USING LOW OSMOLAR CONTRAST: ICD-10-PCS | Performed by: INTERNAL MEDICINE

## 2023-03-03 PROCEDURE — 99233 SBSQ HOSP IP/OBS HIGH 50: CPT | Performed by: CLINICAL NURSE SPECIALIST

## 2023-03-03 PROCEDURE — 85027 COMPLETE CBC AUTOMATED: CPT

## 2023-03-03 PROCEDURE — 99153 MOD SED SAME PHYS/QHP EA: CPT

## 2023-03-03 PROCEDURE — C9600 PERC DRUG-EL COR STENT SING: HCPCS

## 2023-03-03 PROCEDURE — 6360000002 HC RX W HCPCS: Performed by: INTERNAL MEDICINE

## 2023-03-03 PROCEDURE — 6360000004 HC RX CONTRAST MEDICATION: Performed by: INTERNAL MEDICINE

## 2023-03-03 PROCEDURE — 93005 ELECTROCARDIOGRAM TRACING: CPT | Performed by: INTERNAL MEDICINE

## 2023-03-03 PROCEDURE — C1725 CATH, TRANSLUMIN NON-LASER: HCPCS

## 2023-03-03 PROCEDURE — 99152 MOD SED SAME PHYS/QHP 5/>YRS: CPT

## 2023-03-03 PROCEDURE — 93458 L HRT ARTERY/VENTRICLE ANGIO: CPT

## 2023-03-03 PROCEDURE — C1887 CATHETER, GUIDING: HCPCS

## 2023-03-03 PROCEDURE — 027034Z DILATION OF CORONARY ARTERY, ONE ARTERY WITH DRUG-ELUTING INTRALUMINAL DEVICE, PERCUTANEOUS APPROACH: ICD-10-PCS | Performed by: INTERNAL MEDICINE

## 2023-03-03 PROCEDURE — C1769 GUIDE WIRE: HCPCS

## 2023-03-03 PROCEDURE — 2580000003 HC RX 258: Performed by: INTERNAL MEDICINE

## 2023-03-03 PROCEDURE — 80048 BASIC METABOLIC PNL TOTAL CA: CPT

## 2023-03-03 PROCEDURE — 6370000000 HC RX 637 (ALT 250 FOR IP)

## 2023-03-03 PROCEDURE — 36415 COLL VENOUS BLD VENIPUNCTURE: CPT

## 2023-03-03 PROCEDURE — 71045 X-RAY EXAM CHEST 1 VIEW: CPT

## 2023-03-03 PROCEDURE — 2140000000 HC CCU INTERMEDIATE R&B

## 2023-03-03 PROCEDURE — C1874 STENT, COATED/COV W/DEL SYS: HCPCS

## 2023-03-03 PROCEDURE — 2709999900 HC NON-CHARGEABLE SUPPLY

## 2023-03-03 PROCEDURE — 85347 COAGULATION TIME ACTIVATED: CPT

## 2023-03-03 PROCEDURE — 2580000003 HC RX 258: Performed by: HOSPITALIST

## 2023-03-03 PROCEDURE — B2111ZZ FLUOROSCOPY OF MULTIPLE CORONARY ARTERIES USING LOW OSMOLAR CONTRAST: ICD-10-PCS | Performed by: INTERNAL MEDICINE

## 2023-03-03 PROCEDURE — 6370000000 HC RX 637 (ALT 250 FOR IP): Performed by: NURSE PRACTITIONER

## 2023-03-03 PROCEDURE — 6360000002 HC RX W HCPCS

## 2023-03-03 PROCEDURE — C1894 INTRO/SHEATH, NON-LASER: HCPCS

## 2023-03-03 RX ORDER — SODIUM CHLORIDE 9 MG/ML
INJECTION, SOLUTION INTRAVENOUS PRN
Status: DISCONTINUED | OUTPATIENT
Start: 2023-03-03 | End: 2023-03-07 | Stop reason: HOSPADM

## 2023-03-03 RX ORDER — ACETAMINOPHEN 325 MG/1
650 TABLET ORAL EVERY 4 HOURS PRN
Status: DISCONTINUED | OUTPATIENT
Start: 2023-03-03 | End: 2023-03-06

## 2023-03-03 RX ORDER — ASPIRIN 81 MG/1
81 TABLET ORAL DAILY
Status: DISCONTINUED | OUTPATIENT
Start: 2023-03-03 | End: 2023-03-06

## 2023-03-03 RX ORDER — ATORVASTATIN CALCIUM 40 MG/1
40 TABLET, FILM COATED ORAL NIGHTLY
Status: DISCONTINUED | OUTPATIENT
Start: 2023-03-03 | End: 2023-03-06

## 2023-03-03 RX ORDER — CLOPIDOGREL BISULFATE 75 MG/1
75 TABLET ORAL DAILY
Status: DISCONTINUED | OUTPATIENT
Start: 2023-03-04 | End: 2023-03-07 | Stop reason: HOSPADM

## 2023-03-03 RX ORDER — SODIUM CHLORIDE 0.9 % (FLUSH) 0.9 %
5-40 SYRINGE (ML) INJECTION EVERY 12 HOURS SCHEDULED
Status: DISCONTINUED | OUTPATIENT
Start: 2023-03-03 | End: 2023-03-07 | Stop reason: HOSPADM

## 2023-03-03 RX ORDER — LISINOPRIL 5 MG/1
2.5 TABLET ORAL DAILY
Status: DISCONTINUED | OUTPATIENT
Start: 2023-03-04 | End: 2023-03-07 | Stop reason: HOSPADM

## 2023-03-03 RX ORDER — METOPROLOL SUCCINATE 50 MG/1
75 TABLET, EXTENDED RELEASE ORAL DAILY
Status: DISCONTINUED | OUTPATIENT
Start: 2023-03-03 | End: 2023-03-04

## 2023-03-03 RX ORDER — SODIUM CHLORIDE 0.9 % (FLUSH) 0.9 %
5-40 SYRINGE (ML) INJECTION PRN
Status: DISCONTINUED | OUTPATIENT
Start: 2023-03-03 | End: 2023-03-07 | Stop reason: HOSPADM

## 2023-03-03 RX ORDER — SPIRONOLACTONE 25 MG/1
25 TABLET ORAL DAILY
Status: DISCONTINUED | OUTPATIENT
Start: 2023-03-04 | End: 2023-03-07 | Stop reason: HOSPADM

## 2023-03-03 RX ADMIN — FUROSEMIDE 40 MG: 40 TABLET ORAL at 19:58

## 2023-03-03 RX ADMIN — METOPROLOL SUCCINATE 75 MG: 50 TABLET, EXTENDED RELEASE ORAL at 09:15

## 2023-03-03 RX ADMIN — Medication 10 ML: at 09:15

## 2023-03-03 RX ADMIN — Medication 10 ML: at 19:59

## 2023-03-03 RX ADMIN — ENOXAPARIN SODIUM 80 MG: 100 INJECTION SUBCUTANEOUS at 06:11

## 2023-03-03 RX ADMIN — Medication 7.5 MG/HR: at 06:08

## 2023-03-03 RX ADMIN — ASPIRIN 81 MG: 81 TABLET, COATED ORAL at 09:15

## 2023-03-03 RX ADMIN — ATORVASTATIN CALCIUM 40 MG: 40 TABLET, FILM COATED ORAL at 19:59

## 2023-03-03 RX ADMIN — IOPAMIDOL 185 ML: 755 INJECTION, SOLUTION INTRAVENOUS at 14:53

## 2023-03-03 RX ADMIN — FUROSEMIDE 40 MG: 40 TABLET ORAL at 09:15

## 2023-03-03 NOTE — PROGRESS NOTES
Pharmacy to check patient copay for Eliquis/Xarelto    Copay for patient will be: $45/month    Pharmacy will continue to follow the decision on therapy and  the patient if appropriate.        Sean Camarena, PharmD, [facility]  [id number]

## 2023-03-03 NOTE — PROGRESS NOTES
Cardiovascular Progress Note      Chief Complaint:   Chief Complaint   Patient presents with    Shortness of Breath     Complaints of shortness of breath x1 week with ambulation, states coughing up blood tinged sputum for a few days, presents with a HR of 150-170, only hx of HTN     Impression/Recommendations:    78 y/o male    Acute systolic CHF (LVEF 42%, severe MR)  New onset AF, RVR presentation  Hemoptysis, resolved   Hypertension   Hyperlipidemia   Hx. [address] 2013       Now rate controlled and compensated for diagnositc left +/- right heart catheterization. Currently on therapeutic Lovenox. EP considering ARAMIS/DCCV with short term AC +/- staged CHENCHO occlusion 2/2 Hx. Head bleed. S/P Successful PCI to critical mid RCA with one drug eluting stent. Plavix loaded in lab and recommended for at least 3 months. Therapeutic Lovenox can be continued prior to day of staged PCI-OM, tentatively 3/6/2023 (consider anesthesia). EP decision regarding ARAMIS/DCCV and AC following. Avoid triple therapy given history of ICH. Moderate intensity statin. Guideline directed beta blocker, ACE inhibitor and aldosterone antagonist.       Risks, benefits, goals, and alternatives of left heart catheterization with the potential for percutaneous coronary intervention discussed with patient; including stroke, heart attack, kidney damage, death, paralysis, disability, damage to nerves/arteries/veins. All questions answered and informed consent obtained. Further recommendations pending coronary angiography and clinical course. Interval History:   No events overnight. No chest pain, orthopnea, LE edema. Troponin <0.01     3/1/2023 ECG  Atrial fibrillation with rapid ventricular response  Marked ST abnormality, possible anterior subendocardial injury    3/2/2023 TTE   -Atrial fibrillation throughout the study.   -Left ventricular systolic function is reduced with ejection fraction   estimated at 25 %. -Global hypokinesis. Barbara Nicolas -Sigmoid septum is present with normal thickness of remaining left   ventricular wall segments.   -Left ventricular size is mildly increased. -Bi-atrial enlargement.   -Aortic valve appears sclerotic but opens adequately. -Mitral valve leaflets appear mildly thickened. -Mild mitral valve prolapse of the anterior leaflet.   -Severe mitral regurgitation with posteriorly directed jet. -Mild tricuspid regurgitation. -IVC size is dilated (>2.1 cm) but collapses > 50% with respiration   consistent with elevated RA pressure (8 mmHg). -The ascending aorta is mildly dilated. 3.8 cm    3/1/2023 CTA Chest      FINDINGS:   Pulmonary Arteries: Pulmonary arteries are adequately opacified for   evaluation. No evidence of intraluminal filling defect to suggest pulmonary   embolism. Main pulmonary artery is normal in caliber. Mediastinum: No evidence of mediastinal lymphadenopathy. The heart and   pericardium demonstrate no acute abnormality. There is no acute abnormality   of the thoracic aorta. Lungs/pleura: The there are small bilateral pleural effusions noted. There   is a mild infiltrate noted in the the left lower lobe. There is a calcified   granuloma noted right lower lobe, with calcified right hilar and subcarinal   nodes. Upper Abdomen: Limited images of the upper abdomen are unremarkable. Soft Tissues/Bones: No acute bone or soft tissue abnormality.                   Medications:  furosemide (LASIX) tablet 40 mg, BID  metoprolol succinate (TOPROL XL) extended release tablet 50 mg, Daily  enoxaparin (LOVENOX) injection 80 mg, BID  cefTRIAXone (ROCEPHIN) 1,000 mg in sodium chloride 0.9 % 50 mL IVPB (mini-bag), Q24H  clonazePAM (KLONOPIN) tablet 0.5 mg, Q12H PRN  dilTIAZem HCl-Sodium Chloride 125 mg / 125 mL infusion, Continuous  oxyCODONE (OXY-IR) immediate release tablet 15 mg, TID PRN  perflutren lipid microspheres (DEFINITY) injection 1.5 mL, ONCE PRN  sodium chloride flush 0.9 % injection 5-40 mL, 2 times per day  sodium chloride flush 0.9 % injection 5-40 mL, PRN  0.9 % sodium chloride infusion, PRN  ondansetron (ZOFRAN-ODT) disintegrating tablet 4 mg, Q8H PRN   Or  ondansetron (ZOFRAN) injection 4 mg, Q6H PRN  polyethylene glycol (GLYCOLAX) packet 17 g, Daily PRN  acetaminophen (TYLENOL) tablet 650 mg, Q6H PRN   Or  acetaminophen (TYLENOL) suppository 650 mg, Q6H PRN        I/O:     Intake/Output Summary (Last 24 hours) at 3/3/2023 0731  Last data filed at 3/3/2023 0620  Gross per 24 hour   Intake 10 ml   Output 1400 ml   Net -1390 ml       Physical Exam:    /78   Pulse 90   Temp 97.4 °F (36.3 °C) (Axillary)   Resp 16   Ht 6' (1.829 m)   Wt 169 lb 4.8 oz (76.8 kg)   SpO2 96%   BMI 22.96 kg/m²   Wt Readings from Last 3 Encounters:   03/03/23 169 lb 4.8 oz (76.8 kg)   05/24/22 165 lb (74.8 kg)   05/06/20 178 lb (80.7 kg)       GENERAL: Well developed, well nourished, no acute distress  NEUROLOGICAL: Alert and oriented x3  PSYCH: Normal mood and affect   SKIN: Warm and dry, without lesions  HEENT: Normocephalic, atraumatic, Sclera non-icteric, mucous membranes moist  NECK: supple, JVP normal, thyroid not enlarged   CAROTID: Normal upstroke, no bruits  CARDIAC: Normal PMI, regular rate and rhythm, normal S1S2, no murmur, rub  RESPIRATORY: Normal respiratory effort, clear to auscultation bilaterally  EXTREMITIES: No cyanosis, clubbing or edema, palpable pulses bilaterally   MUSCULOSKELETAL: No joint swelling or tenderness, no chest wall tenderness  GASTROINTESTINAL:  soft, non-tender, no bruit    Data Review:    CBC:   Recent Labs     03/01/23  1233 03/02/23  0423 03/03/23  0448   WBC 8.8 9.8 8.6   HGB 12.5* 12.4* 11.7*   HCT 37.1* 36.7* 35.1*   MCV 90.6 90.6 90.3    213 190     BMP:   Recent Labs     03/01/23  1233 03/02/23  0423 03/03/23  0448    137 140   K 4.0 4.4 4.0    107 105   CO2 23 21 25   BUN 20 23* 21*   CREATININE 1.1 1.2 1.3   MG 1.90  --   -- LFTS:   Recent Labs     03/01/23  1233   ALT 18   AST 22   ALKPHOS 120   PROT 7.3   AGRATIO 1.0*   BILITOT 1.0     Cardiac Enzymes:   Recent Labs     03/01/23  1233   TROPONINI <0.01       Jacinta Mohr [facility]  Interventional Cardiology     o: [phone]  [address] [facility] [address], [address]      NOTE:  This report was transcribed using voice recognition software. Every effort was made to ensure accuracy; however, inadvertent computerized transcription errors may be present.

## 2023-03-03 NOTE — PRE SEDATION
Pre-Op Note/Sedation Assessment    Laci Vargas  1955  [id number]  7:53 AM    Planned Procedure: Cardiac Catheterization Procedure  Post Procedure Plan: Return to same level of care  Consent: I have discussed with the patient and/or the patient representative the indication, alternatives, and the possible risks and/or complications of the planned procedure and the anesthesia methods. The patient and/or patient representative appear to understand and agree to proceed. Chief Complaint:   Dyspnea on Exertion      Indications for Cath Procedure:  Presentation:  Suspected CAD, Cardiac Arrythmia, and Cardiomyopathy  2. Anginal Classification within 2 weeks:  No symptoms  3. Angina Symptoms Assessment:  Asymptomatic  4. Heart Failure Class within last 2 weeks:  Yes:  Heart Failure Type: Systolic Severity:  Class IV - Symptoms of HF at rest  5. Cardiovascular Instability:  Yes:  Decompensating heart failure and Acute CHF symptoms    Prior Ischemic Workup/Eval:  Pre-Procedural Medications: Yes: ACE/ARB/ARNI, Aspirin, Beta Blockers, and STATIN  2. Stress Test Completed? No stress     Does Patient need surgery?   Cath Valve Surgery:  No    Pre-Procedure Medical History:  Vital Signs:  /78   Pulse 90   Temp 97.4 °F (36.3 °C) (Axillary)   Resp 16   Ht 6' (1.829 m)   Wt 169 lb 4.8 oz (76.8 kg)   SpO2 96%   BMI 22.96 kg/m²     Allergies:  No Known Allergies  Medications:    Current Facility-Administered Medications   Medication Dose Route Frequency Provider Last Rate Last Admin    metoprolol succinate (TOPROL XL) extended release tablet 75 mg  75 mg Oral Daily Tarun Sanabria, APRN - CNP        furosemide (LASIX) tablet 40 mg  40 mg Oral BID Jelani Arthur MD   40 mg at 03/02/23 1649    [Held by provider] enoxaparin (LOVENOX) injection 80 mg  1 mg/kg SubCUTAneous BID Jelani Arthur MD   80 mg at 03/03/23 0611    cefTRIAXone (ROCEPHIN) 1,000 mg in sodium chloride 0.9 % 50 mL IVPB (mini-bag) 1,000 mg IntraVENous Q24H Drew Epley, APRN - CNP   Stopped at 03/02/23 2039    clonazePAM (KLONOPIN) tablet 0.5 mg  0.5 mg Oral Q12H PRN Drew Epley, APRN - CNP        dilTIAZem HCl-Sodium Chloride 125 mg / 125 mL infusion  2.5-15 mg/hr IntraVENous Continuous Svetlana Van MD 7.5 mL/hr at 03/03/23 0608 7.5 mg/hr at 03/03/23 0550    oxyCODONE (OXY-IR) immediate release tablet 15 mg  15 mg Oral TID PRN Svetlana Wang MD        perflutren lipid microspheres (DEFINITY) injection 1.5 mL  1.5 mL IntraVENous ONCE PRN Svetlana Van MD        sodium chloride flush 0.9 % injection 5-40 mL  5-40 mL IntraVENous 2 times per day Marcus Sutton MD   10 mL at 03/02/23 2028    sodium chloride flush 0.9 % injection 5-40 mL  5-40 mL IntraVENous PRN Marcus Sutton MD        0.9 % sodium chloride infusion   IntraVENous PRN Svetlana Van MD        ondansetron (ZOFRAN-ODT) disintegrating tablet 4 mg  4 mg Oral Q8H PRN Svetlana Van MD        Or    ondansetron (ZOFRAN) injection 4 mg  4 mg IntraVENous Q6H PRN Marcus Sutton MD        polyethylene glycol (GLYCOLAX) packet 17 g  17 g Oral Daily PRN Marcus Sutton MD        acetaminophen (TYLENOL) tablet 650 mg  650 mg Oral Q6H PRN Marcus Sutton MD        Or    acetaminophen (TYLENOL) suppository 650 mg  650 mg Rectal Q6H PRN Marcus Sutton MD           Past Medical History:    Past Medical History:   Diagnosis Date    Headache     Hypertension     Intracerebral hemorrhage ([facility] [address] 5/12/2013    Recurrent 8/6/13 - Right occipitoparietal    Neck pain     Unspecified cerebral artery occlusion with cerebral infarction        Surgical History:    Past Surgical History:   Procedure Laterality Date    SHOULDER SURGERY         Pre-Sedation:  Pre-Sedation Documentation and Exam:  I have assessed the patient and reviewed the H&P on the chart.     Prior History of Anesthesia Complications:   none    Modified Mallampati:  II (soft palate, uvula, fauces visible)    ASA Classification:  Class 3 - A patient with severe systemic disease that limits activity but is not incapacitating    Hayden Scale: Activity:  2 - Able to move 4 extremities voluntarily on command  Respiration:  2 - Able to breathe deeply and cough freely  Circulation:  2 - BP+/- 20mmHg of normal  Consciousness:  2 - Fully awake  Oxygen Saturation (color):  2 - Able to maintain oxygen saturation >92% on room air    Sedation/Anesthesia Plan:  Guard the patient's safety and welfare. Minimize physical discomfort and pain. Minimize negative psychological responses to treatment by providing sedation and analgesia and maximize the potential amnesia. Patient to meet pre-procedure discharge plan.     Medication Planned:  midazolam intravenously and fentanyl intravenously    Patient is an appropriate candidate for plan of sedation:   yes      Patricia Ojeda DO, [facility] [place]  Interventional Cardiology     o: [phone]  [facility]., [address], [address] [facility]

## 2023-03-03 NOTE — PROGRESS NOTES
[facility]   Daily Progress Note      Admit Date:  3/1/2023    HPI:    Mr. 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 He was not aware of irregular HR, found to be in AF with RVR. He was started on IV cardizem. Procalcitonin 0.07       Subjective:  Patient is being seen for new systolic heart failure. There were no acute overnight cardiac events. He is sitting on the side of the bed on room air awaiting his [facility] today. He denies any chest pain or increased shortness of breath. Creat 1.3 potassium 4.0 weight 170-169     Objective:   BP (!) 135/97   Pulse 70   Temp 97.6 °F (36.4 °C) (Oral)   Resp 16   Ht 6' (1.829 m)   Wt 169 lb 4.8 oz (76.8 kg)   SpO2 96%   BMI 22.96 kg/m²     Intake/Output Summary (Last 24 hours) at 3/3/2023 1026  Last data filed at 3/3/2023 0620  Gross per 24 hour   Intake 10 ml   Output 1400 ml   Net -1390 ml          Physical Exam:  General:  Awake, alert, oriented in NAD  Skin:  Warm and dry. No unusual bruising or rash  Neck:  Supple. No JVD or carotid bruit appreciated  Chest:  Normal effort.   Clear to auscultation, no wheezes/rhonchi/rales  Cardiovascular:  RRR, S1/S2, no murmur/gallop/rub  Abdomen:  Soft, nontender, +bowel sounds  Extremities:  No edema  Neurological: No focal deficits  Psychological: Normal mood and affect      Medications:    metoprolol succinate  75 mg Oral Daily    aspirin  81 mg Oral Daily    atorvastatin  40 mg Oral Nightly    furosemide  40 mg Oral BID    [Held by provider] enoxaparin  1 mg/kg SubCUTAneous BID    sodium chloride flush  5-40 mL IntraVENous 2 times per day      dilTIAZem 7.5 mg/hr (03/03/23 0675)    sodium chloride         Lab Data:  CBC:   Recent Labs     03/01/23  1233 03/02/23  0423 03/03/23  0448   WBC 8.8 9.8 8.6   HGB 12.5* 12.4* 11.7*    213 190     BMP:    Recent Labs     03/01/23  1233 03/02/23  0423 03/03/23  0448    137 140   K 4.0 4.4 4.0   CO2 23 21 25   BUN 20 23* 21*   CREATININE 1.1 1.2 1.3     INR:  No results for input(s): INR in the last 72 hours. BNP:    Recent Labs     03/01/23  1233   PROBNP 10,110*         Diagnostics:  Echo 3/2/2023  Summary   -Atrial fibrillation throughout the study.   -Left ventricular systolic function is reduced with ejection fraction   estimated at 25 %. -Global hypokinesis. .   -Sigmoid septum is present with normal thickness of remaining left   ventricular wall segments.   -Left ventricular size is mildly increased. -Bi-atrial enlargement.   -Aortic valve appears sclerotic but opens adequately. -Mitral valve leaflets appear mildly thickened. -Mild mitral valve prolapse of the anterior leaflet.   -Severe mitral regurgitation with posteriorly directed jet. -Mild tricuspid regurgitation. -IVC size is dilated (>2.1 cm) but collapses > 50% with respiration   consistent with elevated RA pressure (8 mmHg). -The ascending aorta is mildly dilated. 3.8 cm    Assessment:    1. AF with RVR  2. Acute systolic heart failure, on bb  3. Moderate to severe mitral regurgitation   4. History of CVA      Plan:    Stop cardizem drip discussed with EP  Continue toprol 75 mg daily  [facility] today   Continue lasix 40 mg po twice a day  CHF nurse following for diet education, fluid restriction and daily weights  Consider adding entresto after LHC keep off lisinopril for now  Consider starting aldactone and sglt2 as well    NYHA II    Discussed with patient who is agreeable with plan of care. Thank you for allowing me to participate in the care of your patient.     Magdalena Mello, GOLDEN - CNS, CNS

## 2023-03-03 NOTE — OP NOTE
Cardiac Catheterization     Procedure: LHC, LVG, PCI-mid RCA   Complications: None  Medications: Procedural sedation with minimal conscious sedation (3.5 Versed/ 75 Fentanyl)  An independent trained observer pushed medications at my direction. We monitored the patient's level of consciousness and vital signs/physiologic status throughout the procedure duration (see start and stop times in log). Estimated Blood Loss: Less than 20 mL  Specimens: were not obtained  Access: 6 Fr Right Radial   Closure: TR Band   Contrast: 185 cc  Start time: 1324  Stop time: 7468  Fluoro time: 12.5  Findings:     Left Heart Cath  Dominance: Co     LM: 20% proximal   LAD: 40% mid stenosis; 30% proximal first diagonal   LCx: large vessel with moderate sized obtuse marginals (85% proximal stenosis of OM1, 40% proximal stenosis of OM2), 60% distal lesion  RCA: 40% proximal, 99% mid, 30% distal stenoses; SILVIA II flow beyond critical lesion     LVEDP: 7 mmHg  LVEF: 20-25%    6 Fr JR4 Guide  Boris Blue wire  6 Fr Guideliner    3.0 x 15 mm Trek predil    3.5 x 23 mm Xience WILFREDO deployed to 16 HAWA    0% residual, SILVIA III flow of distal RCA     Given significant restlessness throughout and eventual need for arm and head restraints, decision was made to end procedure with good PCI result before potential for patient harming oneself or requiring general anesthesia. Impression/Recommendations  S/P Successful PCI to critical mid RCA with one drug eluting stent. Plavix loaded in lab and recommended for at least 3 months. Therapeutic Lovenox can be continued prior to day of staged PCI-OM, tentatively 3/6/2023 (consider anesthesia). EP decision regarding ARAMIS/DCCV and AC following. Avoid triple therapy given history of ICH. Moderate intensity statin.    Guideline directed beta blocker, ACE inhibitor and aldosterone antagonist.        Patricia Ojeda DO, [facility] [place]  Interventional Cardiology     o: [phone]  [address] [place] [address]., [address] [facility], [address] [place] [address]

## 2023-03-03 NOTE — PROGRESS NOTES
Patient brought over to CVU from cath lab and attached to CVU monitoring. Report reviewed with cath lab RN. Right radial soft and no hematoma or oozing noted.   Tegaderm dressing to R wrist.

## 2023-03-03 NOTE — PROGRESS NOTES
[facility] PROGRESS NOTE    3/3/2023 7:38 AM        Name: Angie Keller . Admitted: 3/1/2023  Primary Care Provider: Jasmyne Shook MD (Tel: [phone])      Subjective:   Angie Keller is a 79 y.o. male with a past medical history of hypertension, ICA 8/2013 right occipitoparietal who presented with chest pain and SOB x 1 week. Reported having trouble walking. Admits to blood-tinged sputum a few days ago. Covid negative and flu negative    Interval History: Today, he continues with SOB. No CP. Remains in AF with rates  bpm on diltiazem gtt. Plans for LHC today per IC. Had CT head without acute abnormalities or hemorrhage. Concerned about his birds and fish at home. Independently reviewed interval ancillary notes from cardiology. Problem List  Principal Problem:    A-fib [facility])  Resolved Problems:    * No resolved hospital problems.  *    Assessment and Plan:    Acute  Systolic HF   - CXR with cardiomegaly and mild pulmonary edema   - Continue PO lasix 40 mg bid, or per HF recs   - Likey major source of SOB, however CAD and AF cannot be ruled out   - Stop IV abx today    - EF 25 %, IC to see today to consider for ischemic evaluation/LHC  Atrial Fibrillation RVR   - Remains in AF rates improved on dilt gtt    - Cardizem gtt stopped by cards, increase Toprol to 75 mg daily    - Hx of ICH, On therapeutic Lovenox per cards, hold am dose for possible LHC   - Echo with severe LV dysfunction with EF 25% and severe MR   - Appreciate cardiology recs  Hypertension   - BP elevated, Toprol increased, needs GDMT will gradually titrate  Hx of ICA   - Hx of ICH 5/2013 and again 8/2013 in same region   - CT head with no acute abnormality or hemorrhage     - Increase Toprol and stop diltiazem gtt   - NPO for LHC, hold Lovenox for cath  - Discharge planning per cards recs    Discussed care with patient and nursing  All pertinent updates reviewed with attending physician.      Diet: Diet NPO  Code:Full Code  DVT PPX: Lovenox PPx     Disposition: Home when medically able, denies physical limitation or concerns    Current Medications  furosemide (LASIX) tablet 40 mg, BID  metoprolol succinate (TOPROL XL) extended release tablet 50 mg, Daily  enoxaparin (LOVENOX) injection 80 mg, BID  cefTRIAXone (ROCEPHIN) 1,000 mg in sodium chloride 0.9 % 50 mL IVPB (mini-bag), Q24H  clonazePAM (KLONOPIN) tablet 0.5 mg, Q12H PRN  dilTIAZem HCl-Sodium Chloride 125 mg / 125 mL infusion, Continuous  oxyCODONE (OXY-IR) immediate release tablet 15 mg, TID PRN  perflutren lipid microspheres (DEFINITY) injection 1.5 mL, ONCE PRN  sodium chloride flush 0.9 % injection 5-40 mL, 2 times per day  sodium chloride flush 0.9 % injection 5-40 mL, PRN  0.9 % sodium chloride infusion, PRN  ondansetron (ZOFRAN-ODT) disintegrating tablet 4 mg, Q8H PRN   Or  ondansetron (ZOFRAN) injection 4 mg, Q6H PRN  polyethylene glycol (GLYCOLAX) packet 17 g, Daily PRN  acetaminophen (TYLENOL) tablet 650 mg, Q6H PRN   Or  acetaminophen (TYLENOL) suppository 650 mg, Q6H PRN      Objective:  /78   Pulse 90   Temp 97.4 °F (36.3 °C) (Axillary)   Resp 16   Ht 6' (1.829 m)   Wt 169 lb 4.8 oz (76.8 kg)   SpO2 96%   BMI 22.96 kg/m²   Vitals:    03/03/23 0400   BP: 111/78   Pulse: 90   Resp: 16   Temp: 97.4 °F (36.3 °C)   SpO2: 96%       Intake/Output Summary (Last 24 hours) at 3/3/2023 1130  Last data filed at 3/3/2023 5788  Gross per 24 hour   Intake 10 ml   Output 1400 ml   Net -1390 ml        Wt Readings from Last 3 Encounters:   03/03/23 169 lb 4.8 oz (76.8 kg)   05/24/22 165 lb (74.8 kg)   05/06/20 178 lb (80.7 kg)     Review of Systems:  Constitutional: Negative for fever, weight changes, or weakness  Skin: Negative for bruising, bleeding, blood clots, or changes in skin pigment  HEENT: Negative for vision changes or dysphagia  Respiratory: Reviewed in HPI  Cardiovascular: Reviewed in HPI  Gastrointestinal: Negative for abdominal pain, N/V/D, constipation, or black/tarry stools  Genito-Urinary: Negative for hematuria  Musculoskeletal: No focal weakness  Neurological/Psych: Negative for confusion or TIA-like symptoms. No anxiety, depression, or insomnia    Physical Examination:  Telemetry: Personally Reviewed Atrial fibrillation w/ RVR  Constitutional: Cooperative and in no apparent distress, appears well nourished, No obesity  Skin: Warm and pink; no cyanosis, bruising, or clubbing, No lesions/incisions  HEENT: Symmetric and normocephalic. Conjunctiva pink with clear sclera. Mucus membranes pink and moist.   Cardiovascular: irregular rate and rhythm. S1 & S2, positive for murmurs. Peripheral pulses 2+, No peripheral edema + tachycardia  Respiratory: Respirations symmetric and unlabored. Lungs clear to auscultation bilaterally, no wheezing, crackles, or rhonchi + diminished  Gastrointestinal: Abdomen soft and round. normal bowel sounds. No tenderness  Musculoskeletal: No focal weakness, muscle strength 5/5 bilaterally  Neurologic/Psych: Awake and orientated to person, place and time. Calm affect, appropriate mood. Pertinent labs, diagnostic, and imaging results reviewed as a part of this visit    Labs and Tests:  CBC:   Recent Labs     03/01/23  1233 03/02/23  0423 03/03/23  0448   WBC 8.8 9.8 8.6   HGB 12.5* 12.4* 11.7*    213 190       BMP:    Recent Labs     03/01/23  1233 03/02/23  0423 03/03/23  0448    137 140   K 4.0 4.4 4.0    107 105   CO2 23 21 25   BUN 20 23* 21*   CREATININE 1.1 1.2 1.3   GLUCOSE 111* 106* 136*       Hepatic:   Recent Labs     03/01/23  1233   AST 22   ALT 18   BILITOT 1.0   ALKPHOS 120       Relevant results:  CXR: 3/3/2023  Cardiomegaly with mild pulmonary venous congestion. CTPE: 3/1/2023  No evidence of pulmonary embolism. Mild infiltrate noted in the the left lower lobe, which may represent   pneumonia.      ECG: 3/1/2023  AF with RVR    Echo: 3/2/2023 Summary   -Atrial fibrillation throughout the study.   -Left ventricular systolic function is reduced with ejection fraction   estimated at 25 %. -Global hypokinesis. .   -Sigmoid septum is present with normal thickness of remaining left ventricular wall segments.   -Left ventricular size is mildly increased. -Bi-atrial enlargement.   -Aortic valve appears sclerotic but opens adequately. -Mitral valve leaflets appear mildly thickened. -Mild mitral valve prolapse of the anterior leaflet.   -Severe mitral regurgitation with posteriorly directed jet. -Mild tricuspid regurgitation. -IVC size is dilated (>2.1 cm) but collapses > 50% with respiration   consistent with elevated RA pressure (8 mmHg). -The ascending aorta is mildly dilated.  3.8 cm    GOLDEN Giles - CNP   3/3/2023 7:38 AM

## 2023-03-04 LAB
ANION GAP SERPL CALCULATED.3IONS-SCNC: 12 MMOL/L (ref 3–16)
BUN BLDV-MCNC: 20 MG/DL (ref 7–20)
CALCIUM SERPL-MCNC: 8.7 MG/DL (ref 8.3–10.6)
CHLORIDE BLD-SCNC: 103 MMOL/L (ref 99–110)
CO2: 25 MMOL/L (ref 21–32)
CREAT SERPL-MCNC: 1.2 MG/DL (ref 0.8–1.3)
EKG ATRIAL RATE: 82 BPM
EKG DIAGNOSIS: NORMAL
EKG Q-T INTERVAL: 394 MS
EKG QRS DURATION: 126 MS
EKG QTC CALCULATION (BAZETT): 497 MS
EKG R AXIS: -17 DEGREES
EKG T AXIS: 133 DEGREES
EKG VENTRICULAR RATE: 96 BPM
GFR SERPL CREATININE-BSD FRML MDRD: >60 ML/MIN/{1.73_M2}
GLUCOSE BLD-MCNC: 166 MG/DL (ref 70–99)
HCT VFR BLD CALC: 37.1 % (ref 40.5–52.5)
HEMOGLOBIN: 12.6 G/DL (ref 13.5–17.5)
MCH RBC QN AUTO: 30.4 PG (ref 26–34)
MCHC RBC AUTO-ENTMCNC: 34.1 G/DL (ref 31–36)
MCV RBC AUTO: 89.2 FL (ref 80–100)
PDW BLD-RTO: 13.3 % (ref 12.4–15.4)
PLATELET # BLD: 226 K/UL (ref 135–450)
PMV BLD AUTO: 9.6 FL (ref 5–10.5)
POTASSIUM SERPL-SCNC: 3.5 MMOL/L (ref 3.5–5.1)
PRO-BNP: 7529 PG/ML (ref 0–124)
RBC # BLD: 4.16 M/UL (ref 4.2–5.9)
SODIUM BLD-SCNC: 140 MMOL/L (ref 136–145)
WBC # BLD: 9.3 K/UL (ref 4–11)

## 2023-03-04 PROCEDURE — 99233 SBSQ HOSP IP/OBS HIGH 50: CPT | Performed by: CLINICAL NURSE SPECIALIST

## 2023-03-04 PROCEDURE — 6370000000 HC RX 637 (ALT 250 FOR IP): Performed by: INTERNAL MEDICINE

## 2023-03-04 PROCEDURE — 83880 ASSAY OF NATRIURETIC PEPTIDE: CPT

## 2023-03-04 PROCEDURE — 93010 ELECTROCARDIOGRAM REPORT: CPT | Performed by: INTERNAL MEDICINE

## 2023-03-04 PROCEDURE — 6360000002 HC RX W HCPCS: Performed by: NURSE PRACTITIONER

## 2023-03-04 PROCEDURE — 85027 COMPLETE CBC AUTOMATED: CPT

## 2023-03-04 PROCEDURE — 2580000003 HC RX 258: Performed by: HOSPITALIST

## 2023-03-04 PROCEDURE — 2580000003 HC RX 258: Performed by: INTERNAL MEDICINE

## 2023-03-04 PROCEDURE — 6370000000 HC RX 637 (ALT 250 FOR IP): Performed by: NURSE PRACTITIONER

## 2023-03-04 PROCEDURE — 2140000000 HC CCU INTERMEDIATE R&B

## 2023-03-04 PROCEDURE — 80048 BASIC METABOLIC PNL TOTAL CA: CPT

## 2023-03-04 RX ORDER — METOPROLOL SUCCINATE 50 MG/1
25 TABLET, EXTENDED RELEASE ORAL ONCE
Status: COMPLETED | OUTPATIENT
Start: 2023-03-04 | End: 2023-03-04

## 2023-03-04 RX ORDER — POTASSIUM CHLORIDE 20 MEQ/1
40 TABLET, EXTENDED RELEASE ORAL PRN
Status: DISCONTINUED | OUTPATIENT
Start: 2023-03-04 | End: 2023-03-07 | Stop reason: HOSPADM

## 2023-03-04 RX ORDER — METOPROLOL SUCCINATE 50 MG/1
100 TABLET, EXTENDED RELEASE ORAL DAILY
Status: DISCONTINUED | OUTPATIENT
Start: 2023-03-05 | End: 2023-03-07

## 2023-03-04 RX ORDER — DIPHENHYDRAMINE HCL 25 MG
25 TABLET ORAL EVERY 6 HOURS PRN
Status: DISCONTINUED | OUTPATIENT
Start: 2023-03-04 | End: 2023-03-07 | Stop reason: HOSPADM

## 2023-03-04 RX ORDER — POTASSIUM CHLORIDE 7.45 MG/ML
10 INJECTION INTRAVENOUS PRN
Status: DISCONTINUED | OUTPATIENT
Start: 2023-03-04 | End: 2023-03-07 | Stop reason: HOSPADM

## 2023-03-04 RX ORDER — DIGOXIN 0.25 MG/ML
250 INJECTION INTRAMUSCULAR; INTRAVENOUS ONCE
Status: COMPLETED | OUTPATIENT
Start: 2023-03-04 | End: 2023-03-04

## 2023-03-04 RX ADMIN — ASPIRIN 81 MG: 81 TABLET, COATED ORAL at 08:31

## 2023-03-04 RX ADMIN — CLOPIDOGREL BISULFATE 75 MG: 75 TABLET ORAL at 08:31

## 2023-03-04 RX ADMIN — SPIRONOLACTONE 25 MG: 25 TABLET ORAL at 08:31

## 2023-03-04 RX ADMIN — FUROSEMIDE 40 MG: 40 TABLET ORAL at 08:30

## 2023-03-04 RX ADMIN — Medication 10 ML: at 08:31

## 2023-03-04 RX ADMIN — DIGOXIN 250 MCG: 0.25 INJECTION INTRAMUSCULAR; INTRAVENOUS at 18:45

## 2023-03-04 RX ADMIN — LISINOPRIL 2.5 MG: 5 TABLET ORAL at 08:28

## 2023-03-04 RX ADMIN — METOPROLOL SUCCINATE 25 MG: 50 TABLET, FILM COATED, EXTENDED RELEASE ORAL at 09:45

## 2023-03-04 RX ADMIN — ATORVASTATIN CALCIUM 40 MG: 40 TABLET, FILM COATED ORAL at 20:19

## 2023-03-04 RX ADMIN — FUROSEMIDE 40 MG: 40 TABLET ORAL at 17:43

## 2023-03-04 RX ADMIN — Medication 10 ML: at 20:20

## 2023-03-04 RX ADMIN — METOPROLOL SUCCINATE 75 MG: 50 TABLET, EXTENDED RELEASE ORAL at 08:30

## 2023-03-04 RX ADMIN — Medication 10 ML: at 08:32

## 2023-03-04 RX ADMIN — POTASSIUM CHLORIDE 40 MEQ: 1500 TABLET, EXTENDED RELEASE ORAL at 17:43

## 2023-03-04 ASSESSMENT — PAIN SCALES - GENERAL: PAINLEVEL_OUTOF10: 0

## 2023-03-04 NOTE — PROGRESS NOTES
[facility]   Daily Progress Note      Admit Date:  3/1/2023    HPI:    Mr. Francis Whitten is a 79year old male with CVA with right parietal parenchymal bleed x 2 in 2013 admitted to [facility], he use to build motor homes    He is admitted with shortness of breath a week ago while walking his dog and hemoptysis. He was not aware of irregular HR, found to be in AF with RVR. He was started on IV cardizem. Procalcitonin 0.07       Subjective:  Patient is being seen for new systolic heart failure. There were no acute overnight cardiac events. He had LHC yesterday with PCI to RCA with staged stent on Monday. His HR today is AF with rates  110-120 he has not received his metoprolol yet this morning. He feels good, no chest pain or shortness of breath. Right radial stick without bleeding and good pulse  Creat 1.3-1.2 potassium 3.5 weight 170-169 bnp     Objective:   BP (!) 123/95   Pulse (!) 114   Temp 97.8 °F (36.6 °C)   Resp 18   Ht 6' (1.829 m)   Wt 164 lb 0.4 oz (74.4 kg)   SpO2 96%   BMI 22.25 kg/m²     Intake/Output Summary (Last 24 hours) at 3/4/2023 5413  Last data filed at 3/3/2023 2000  Gross per 24 hour   Intake 120 ml   Output 200 ml   Net -80 ml          Physical Exam:  General:  Awake, alert, oriented in NAD  Skin:  Warm and dry. No unusual bruising or rash  Neck:  Supple. No JVD or carotid bruit appreciated  Chest:  Normal effort.   Clear to auscultation, no wheezes/rhonchi/rales  Cardiovascular:  RRR, S1/S2, no murmur/gallop/rub  Abdomen:  Soft, nontender, +bowel sounds  Extremities:  No edema, right radial stick without bleeding  Neurological: No focal deficits  Psychological: Normal mood and affect      Medications:    metoprolol succinate  75 mg Oral Daily    aspirin  81 mg Oral Daily    atorvastatin  40 mg Oral Nightly    spironolactone  25 mg Oral Daily    clopidogrel  75 mg Oral Daily    sodium chloride flush  5-40 mL IntraVENous 2 times per day    lisinopril  2.5 mg Progress Note      Chief Complaint   Patient presents with   • Shortness of Breath          Subjective:      OBJECTIVE:  Blood pressure 126/76, pulse 79, temperature 97.7 °F (36.5 °C), temperature source Oral, resp. rate 14, height 6' 2\" (1.88 m), weight 87.2 kg (192 lb 3.9 oz), SpO2 (!) 88 %.     Physical Exam  Constitutional:       Appearance: He is ill-appearing.   HENT:      Mouth/Throat:      Mouth: Mucous membranes are moist.      Pharynx: No oropharyngeal exudate.   Eyes:      General: No scleral icterus.     Conjunctiva/sclera: Conjunctivae normal.   Cardiovascular:      Rate and Rhythm: Normal rate and regular rhythm.      Heart sounds: Normal heart sounds.   Pulmonary:      Effort: Pulmonary effort is normal. No respiratory distress.      Breath sounds: Rhonchi present.   Abdominal:      General: Abdomen is flat. Bowel sounds are normal. There is no distension.      Tenderness: There is no abdominal tenderness.   Musculoskeletal:      Cervical back: No tenderness.      Right lower leg: No edema.      Left lower leg: No edema.   Skin:     General: Skin is warm.      Findings: No erythema or rash.   Neurological:      General: No focal deficit present.      Mental Status: He is alert and oriented to person, place, and time.      Motor: Weakness present.   Psychiatric:         Mood and Affect: Mood normal.            Diagnostic Data:     CBC:   WBC (K/mcL)   Date Value   11/04/2021 14.6 (H)     HCT (%)   Date Value   11/04/2021 45.2     HGB (g/dL)   Date Value   11/04/2021 14.9     PLT (K/mcL)   Date Value   11/04/2021 216       CMP:  Glucose (mg/dL)   Date Value   11/04/2021 140 (H)     Sodium (mmol/L)   Date Value   11/04/2021 137     Potassium (mmol/L)   Date Value   11/04/2021 4.3     Chloride (mmol/L)   Date Value   11/04/2021 103     Carbon Dioxide (mmol/L)   Date Value   11/04/2021 30     BUN (mg/dL)   Date Value   11/04/2021 40 (H)     Creatinine (mg/dL)   Date Value   11/04/2021 1.07     Protein,  Oral Daily    furosemide  40 mg Oral BID    [Held by provider] enoxaparin  1 mg/kg SubCUTAneous BID    sodium chloride flush  5-40 mL IntraVENous 2 times per day      sodium chloride      sodium chloride         Lab Data:  CBC:   Recent Labs     03/02/23  0423 03/03/23  0448 03/04/23  0330   WBC 9.8 8.6 9.3   HGB 12.4* 11.7* 12.6*    190 226     BMP:    Recent Labs     03/02/23  0423 03/03/23  0448 03/04/23  0330    140 140   K 4.4 4.0 3.5   CO2 21 25 25   BUN 23* 21* 20   CREATININE 1.2 1.3 1.2     INR:  No results for input(s): INR in the last 72 hours. BNP:    Recent Labs     03/01/23  1233 03/04/23  0330   PROBNP 10,110* 7,529*         Diagnostics:  [facility] Dr Lu Rodriguez 3/3/2023  Left Heart Cath  Dominance: Co      LM: 20% proximal   LAD: 40% mid stenosis; 30% proximal first diagonal   LCx: large vessel with moderate sized obtuse marginals (85% proximal stenosis of OM1, 40% proximal stenosis of OM2), 60% distal lesion  RCA: 40% proximal, 99% mid, 30% distal stenoses; SILVIA II flow beyond critical lesion      LVEDP: 7 mmHg  LVEF: 20-25%     6 Fr JR4 Guide  Boris Blue wire  6 Fr Guideliner     3.0 x 15 mm Trek predil     3.5 x 23 mm Xience WILFREDO deployed to 16 HAWA     0% residual, SILVIA III flow of distal RCA      Given significant restlessness throughout and eventual need for arm and head restraints, decision was made to end procedure with good PCI result before potential for patient harming oneself or requiring general anesthesia. Impression/Recommendations  S/P Successful PCI to critical mid RCA with one drug eluting stent. Plavix loaded in lab and recommended for at least 3 months. Therapeutic Lovenox can be continued prior to day of staged PCI-OM, tentatively 3/6/2023 (consider anesthesia). EP decision regarding ARAMIS/DCCV and AC following. Avoid triple therapy given history of ICH. Moderate intensity statin.    Guideline directed beta blocker, ACE inhibitor and aldosterone antagonist.       Echo Total (g/dL)   Date Value   11/02/2021 5.5 (L)     Albumin (g/dL)   Date Value   11/02/2021 2.6 (L)     Calcium (mg/dL)   Date Value   11/04/2021 8.4     Bilirubin, Total (mg/dL)   Date Value   11/02/2021 0.7     GOT/AST (Units/L)   Date Value   11/02/2021 10     Alkaline Phosphatase (Units/L)   Date Value   11/02/2021 68     GPT/ALT (Units/L)   Date Value   11/02/2021 16     Anion Gap (mmol/L)   Date Value   11/04/2021 8 (L)     BUN/ Creatinine Ratio (no units)   Date Value   11/04/2021 37 (H)     Globulin (g/dL)   Date Value   11/02/2021 2.9     A/G Ratio (no units)   Date Value   11/02/2021 0.9 (L)     GFR Estimate, Non  (no units)   Date Value   12/27/2019 62   12/27/2019     eGFR 60 - 89 mL/min/1.73m2 = Mild decrease in kidney function.     GFR Estimate,  (no units)   Date Value   12/27/2019 72   12/27/2019     eGFR 60 - 89 mL/min/1.73m2 = Mild decrease in kidney function.              Imaging:   CT CHEST WO CONTRAST   Final Result      1.  Severe emphysema without lung mass, concerning lung nodule, or dense   lung consolidation.      2.  Small pleural effusions and bilateral lung opacities that are mostly   dependent which were present in December 2019 but are mildly progressed   since that time.  These opacities are favored to represent scarring,   atelectasis, and the patient's emphysema.  Infection is considered less   likely but ultimately early infection cannot be excluded.      3.  Multiple other chronic abnormalities are detailed above.      Electronically Signed by: FRANCISCO NOEL M.D.    Signed on: 11/2/2021 3:09 PM          US VASC EXTREMITY UPPER VENOUS DUPLEX   Final Result      As above.      Electronically Signed by: FRANCISCO NOEL M.D.    Signed on: 10/31/2021 12:50 PM          XR CHEST PA OR AP 1 VIEW   Final Result      1.  Interval increased interstitial opacities in the lungs bilaterally   which may represent interval progression of interstitial lung disease    although acute edema/infection is difficult to exclude.   2.  Lung emphysema.   3.  Additional findings as described above.      Electronically Signed by: JUANITO WILKES M.D.    Signed on: 10/29/2021 1:18 PM                ASSESSMENT / PLAN:  1. Admitted with COPD exacerbation, appears improved since steroid taper adjusted still weak will transfer to floor increase activity, monitor lytes                Meds:     Current Facility-Administered Medications   Medication Dose Route Frequency Provider Last Rate Last Admin   • predniSONE (DELTASONE) tablet 40 mg  40 mg Oral BID  Butch Buenrostro MD   40 mg at 11/04/21 1838   • metoPROLOL succinate (TOPROL-XL) ER tablet 50 mg  50 mg Oral Daily Dhruv Breen MD   50 mg at 11/04/21 0909   • torsemide (DEMADEX) tablet 20 mg  20 mg Oral Daily Dhruv Breen MD   20 mg at 11/04/21 0909   • HYDROcodone-acetaminophen (NORCO) 5-325 MG per tablet 1 tablet  1 tablet Oral Q4H PRN Wild Ayoub MD   1 tablet at 11/04/21 2131   • albuterol (VENTOLIN) nebulizer 2.5 mg  2.5 mg Nebulization Q4H Resp PRN Butch Buenrostro MD   2.5 mg at 11/03/21 0857   • WARFARIN - PHARMACIST MONITORED Misc   Does not apply See Admin Instructions Wild Ayoub MD       • cefepime (MAXIPIME) 1,000 mg in sodium chloride 0.9 % 100 mL IVPB  1,000 mg Intravenous Q12H Butch Buenrostro  mL/hr at 11/04/21 1840 1,000 mg at 11/04/21 1840   • ipratropium-albuterol (DUONEB) 0.5-2.5 (3) MG/3ML nebulizer solution 3 mL  3 mL Nebulization 4x Daily Resp Butch Buenrostro MD   3 mL at 11/04/21 2106   • budesonide-formoterol (SYMBICORT) 160-4.5 MCG/ACT inhaler 2 puff  2 puff Inhalation BID Wild Ayoub MD   2 puff at 11/04/21 2111   • finasteride (PROSCAR) tablet 5 mg  5 mg Oral Daily Wild Ayoub MD   5 mg at 11/04/21 0909   • montelukast (SINGULAIR) tablet 10 mg  10 mg Oral Nightly Wild Ayoub MD   10 mg at 11/04/21 2131   • sodium chloride 0.9 % flush bag 25 mL  25 mL Intravenous PRN Wild Ayoub MD    3/2/2023  Summary   -Atrial fibrillation throughout the study.   -Left ventricular systolic function is reduced with ejection fraction   estimated at 25 %. -Global hypokinesis. .   -Sigmoid septum is present with normal thickness of remaining left   ventricular wall segments.   -Left ventricular size is mildly increased. -Bi-atrial enlargement.   -Aortic valve appears sclerotic but opens adequately. -Mitral valve leaflets appear mildly thickened. -Mild mitral valve prolapse of the anterior leaflet.   -Severe mitral regurgitation with posteriorly directed jet. -Mild tricuspid regurgitation. -IVC size is dilated (>2.1 cm) but collapses > 50% with respiration   consistent with elevated RA pressure (8 mmHg). -The ascending aorta is mildly dilated. 3.8 cm    Assessment:    1. AF with RVR  2. Acute systolic heart failure, on bb  3. Moderate to severe mitral regurgitation   4. History of CVA      Plan:    Continue lisinopril 2.5 mg once a day  Continue toprol 75 mg daily  Can give a dose of IV digoxin if no improvement in HR after morning dose of metoprolol. Not able to be on cardizem due to low LVEF  Continue lasix 40 mg po twice a day  CHF nurse following for diet education, fluid restriction and daily weights  Consider adding sglt2  after staged cath on monday  Continue aldactone 25 mg once a day    Discussed with bedside nurse and Dr Shon Noland II    Discussed with patient who is agreeable with plan of care. Thank you for allowing me to participate in the care of your patient.     GOLDEN Richardson - CNS, CNS     • sodium chloride (PF) 0.9 % injection 2 mL  2 mL Intracatheter 2 times per day Wild Ayoub MD   2 mL at 11/04/21 2131   • sodium chloride (NORMAL SALINE) 0.9 % bolus 500 mL  500 mL Intravenous PRN Wild Ayoub MD       • acetaminophen (TYLENOL) tablet 650 mg  650 mg Oral Q4H PRN Wild Ayoub MD                Prophylaxis:   DVT with coumadin    Total Time spent with patient and coordinating care:  30  minutes    Thank You,  Wild Ayoub MD

## 2023-03-04 NOTE — PROGRESS NOTES
[facility]    3/4/2023 8:51 AM        Name: Cesario Parker             Admitted: 3/1/2023  Primary Care Provider: Edie Wall MD (Tel: [phone])      Subjective:   Cesario Parker is a 79 y.o. male with a past medical history of hypertension, ICA 8/2013 right occipitoparietal who presented with chest pain and SOB x 1 week. Reported having trouble walking. Admits to blood-tinged sputum a few days ago. Covid negative and flu negative    Interval History: Today, he is resting in bed comfortably. Remains in AF with RVR HR  bpm.  Cards recommend avoiding IV diltiazem. Denies CP, SOB, palpitations. No GI or  complaints. Independently reviewed interval ancillary notes from cardiology. Problem List  Principal Problem:    Atrial fibrillation with rapid ventricular response (HCC)  Active Problems:    Acute systolic heart failure (HCC)    Moderate to severe mitral regurgitation    History of CVA (cerebrovascular accident)    Community acquired pneumonia  Resolved Problems:    * No resolved hospital problems.  *    Assessment and Plan:    Acute Systolic HF   - Appears compensated today  - CXR with cardiomegaly and mild pulmonary edema   - Continue PO lasix 40 mg bid, lisinopril added, and laurent added   - EF 25 %  Atrial Fibrillation RVR   - Remains in AF rates improved on dilt gtt    - Toprol 75 mg daily, increase to 100 mg daily and digoxin can be added if remains tachycardic per cards   - Hx of ICH, therapeutic AC is on hold post cath, consider resuming 3/6 per cards,    - Echo with severe LV dysfunction with EF 25% and severe MR   - Appreciate cardiology recs, prefer no diltiazem IV   CAD   - S/p LHC with stent to mid RCA   - No reports of angina, SOB improved   - Continue DAPT, BB and statin  Hypertension   - BP elevated, Toprol increased, needs GDMT will gradually titrate  Hx of ICA   - Hx of ICH 5/2013 and again 8/2013 in same region   - CT head with no acute abnormality or hemorrhage     - Increase Toprol  - IV digoxin if HR not <120  - ARAMIS/DCCV early next week    Discussed with Kimberly Silva today    Discussed care with patient and nursing  All pertinent updates reviewed with attending physician. Diet: Diet NPO  ADULT DIET; Regular; 4 carb choices (60 gm/meal);  Low Fat/Low Chol/High Fiber/2 gm Na  Code:Full Code  DVT PPX: Lovenox PPx     Disposition: Home when medically able, denies physical limitation or concerns    Current Medications  metoprolol succinate (TOPROL XL) extended release tablet 75 mg, Daily  aspirin EC tablet 81 mg, Daily  atorvastatin (LIPITOR) tablet 40 mg, Nightly  spironolactone (ALDACTONE) tablet 25 mg, Daily  clopidogrel (PLAVIX) tablet 75 mg, Daily  sodium chloride flush 0.9 % injection 5-40 mL, 2 times per day  sodium chloride flush 0.9 % injection 5-40 mL, PRN  0.9 % sodium chloride infusion, PRN  acetaminophen (TYLENOL) tablet 650 mg, Q4H PRN  lisinopril (PRINIVIL;ZESTRIL) tablet 2.5 mg, Daily  furosemide (LASIX) tablet 40 mg, BID  [Held by provider] enoxaparin (LOVENOX) injection 80 mg, BID  clonazePAM (KLONOPIN) tablet 0.5 mg, Q12H PRN  oxyCODONE (OXY-IR) immediate release tablet 15 mg, TID PRN  perflutren lipid microspheres (DEFINITY) injection 1.5 mL, ONCE PRN  sodium chloride flush 0.9 % injection 5-40 mL, 2 times per day  sodium chloride flush 0.9 % injection 5-40 mL, PRN  0.9 % sodium chloride infusion, PRN  ondansetron (ZOFRAN-ODT) disintegrating tablet 4 mg, Q8H PRN   Or  ondansetron (ZOFRAN) injection 4 mg, Q6H PRN  polyethylene glycol (GLYCOLAX) packet 17 g, Daily PRN  acetaminophen (TYLENOL) suppository 650 mg, Q6H PRN      Objective:  BP (!) 136/94   Pulse (!) 114   Temp 97.8 °F (36.6 °C)   Resp 18   Ht 6' (1.829 m)   Wt 164 lb 0.4 oz (74.4 kg)   SpO2 96%   BMI 22.25 kg/m²   Vitals:    03/04/23 0828   BP: (!) 136/94   Pulse:    Resp:    Temp:    SpO2:        Intake/Output Summary (Last 24 hours) at 3/4/2023 0851  Last data filed at 3/3/2023 2000  Gross per 24 hour   Intake 120 ml   Output 200 ml   Net -80 ml        Wt Readings from Last 3 Encounters:   03/04/23 164 lb 0.4 oz (74.4 kg)   05/24/22 165 lb (74.8 kg)   05/06/20 178 lb (80.7 kg)     Review of Systems:  Constitutional: Negative for fever, weight changes, or weakness  Skin: Negative for bruising, bleeding, blood clots, or changes in skin pigment  HEENT: Negative for vision changes or dysphagia  Respiratory: Reviewed in HPI  Cardiovascular: Reviewed in HPI  Gastrointestinal: Negative for abdominal pain, N/V/D, constipation, or black/tarry stools  Genito-Urinary: Negative for hematuria  Musculoskeletal: No focal weakness  Neurological/Psych: Negative for confusion or TIA-like symptoms. No anxiety, depression, or insomnia    Physical Examination:  Telemetry: Personally Reviewed Atrial fibrillation w/ RVR  Constitutional: Cooperative and in no apparent distress, appears well nourished, No obesity  Skin: Warm and pink; no cyanosis, bruising, or clubbing, No lesions/incisions  HEENT: Symmetric and normocephalic. Conjunctiva pink with clear sclera. Mucus membranes pink and moist.   Cardiovascular: irregular rate and rhythm. S1 & S2, positive for murmurs. Peripheral pulses 2+, No peripheral edema + tachycardia  Respiratory: Respirations symmetric and unlabored. Lungs clear to auscultation bilaterally, no wheezing, crackles, or rhonchi   Gastrointestinal: Abdomen soft and round. normal bowel sounds. No tenderness  Musculoskeletal: No focal weakness, muscle strength 5/5 bilaterally  Neurologic/Psych: Awake and orientated to person, place and time. Calm affect, appropriate mood.      Pertinent labs, diagnostic, and imaging results reviewed as a part of this visit    Labs and Tests:  CBC:   Recent Labs     03/02/23  0423 03/03/23  0448 03/04/23  0330   WBC 9.8 8.6 9.3   HGB 12.4* 11.7* 12.6*    190 226       BMP:    Recent Labs     03/02/23 0423 03/03/23  0448 03/04/23  0330    140 140   K 4.4 4.0 3.5    105 103   CO2 21 25 25   BUN 23* 21* 20   CREATININE 1.2 1.3 1.2   GLUCOSE 106* 136* 166*       Hepatic:   Recent Labs     03/01/23  1233   AST 22   ALT 18   BILITOT 1.0   ALKPHOS 120       Relevant results:  CXR: 3/3/2023  Cardiomegaly with mild pulmonary venous congestion. CTPE: 3/1/2023  No evidence of pulmonary embolism. Mild infiltrate noted in the the left lower lobe, which may represent   pneumonia. ECG: 3/1/2023  AF with RVR    Echo: 3/2/2023   Summary   -Atrial fibrillation throughout the study.   -Left ventricular systolic function is reduced with ejection fraction   estimated at 25 %. -Global hypokinesis. .   -Sigmoid septum is present with normal thickness of remaining left ventricular wall segments.   -Left ventricular size is mildly increased. -Bi-atrial enlargement.   -Aortic valve appears sclerotic but opens adequately. -Mitral valve leaflets appear mildly thickened. -Mild mitral valve prolapse of the anterior leaflet.   -Severe mitral regurgitation with posteriorly directed jet. -Mild tricuspid regurgitation. -IVC size is dilated (>2.1 cm) but collapses > 50% with respiration   consistent with elevated RA pressure (8 mmHg). -The ascending aorta is mildly dilated.  3.8 cm    Left Heart Cath 3/3/2023  Dominance: Co      LM: 20% proximal   LAD: 40% mid stenosis; 30% proximal first diagonal   LCx: large vessel with moderate sized obtuse marginals (85% proximal stenosis of OM1, 40% proximal stenosis of OM2), 60% distal lesion  RCA: 40% proximal, 99% mid, 30% distal stenoses; SILVIA II flow beyond critical lesion      LVEDP: 7 mmHg  LVEF: 20-25%     6 Fr JR4 Guide  Boris Blue wire  6 Fr Guideliner     3.0 x 15 mm Trek predil     3.5 x 23 mm Xience WILFREDO deployed to 16 HAWA     0% residual, SILVIA III flow of distal RCA      Given significant restlessness throughout and eventual need for arm and head restraints, decision was made to end procedure with good PCI result before potential for patient harming oneself or requiring general anesthesia. Impression/Recommendations  S/P Successful PCI to critical mid RCA with one drug eluting stent. Plavix loaded in lab and recommended for at least 3 months. Therapeutic Lovenox can be continued prior to day of staged PCI-OM, tentatively 3/6/2023 (consider anesthesia). EP decision regarding ARAMIS/DCCV and AC following. Avoid triple therapy given history of ICH. Moderate intensity statin.    Guideline directed beta blocker, ACE inhibitor and aldosterone antagonist.     Drew Epley, GOLDEN - CNP   3/4/2023 8:51 AM

## 2023-03-05 LAB
ANION GAP SERPL CALCULATED.3IONS-SCNC: 8 MMOL/L (ref 3–16)
BASOPHILS ABSOLUTE: 0.1 K/UL (ref 0–0.2)
BASOPHILS RELATIVE PERCENT: 1.3 %
BLOOD CULTURE, ROUTINE: NORMAL
BUN BLDV-MCNC: 21 MG/DL (ref 7–20)
CALCIUM SERPL-MCNC: 9.5 MG/DL (ref 8.3–10.6)
CHLORIDE BLD-SCNC: 105 MMOL/L (ref 99–110)
CO2: 27 MMOL/L (ref 21–32)
CREAT SERPL-MCNC: 1 MG/DL (ref 0.8–1.3)
CULTURE, BLOOD 2: NORMAL
EOSINOPHILS ABSOLUTE: 0.7 K/UL (ref 0–0.6)
EOSINOPHILS RELATIVE PERCENT: 6.9 %
GFR SERPL CREATININE-BSD FRML MDRD: >60 ML/MIN/{1.73_M2}
GLUCOSE BLD-MCNC: 101 MG/DL (ref 70–99)
HCT VFR BLD CALC: 39.8 % (ref 40.5–52.5)
HEMOGLOBIN: 13.8 G/DL (ref 13.5–17.5)
LYMPHOCYTES ABSOLUTE: 1.7 K/UL (ref 1–5.1)
LYMPHOCYTES RELATIVE PERCENT: 16.5 %
MAGNESIUM: 2.1 MG/DL (ref 1.8–2.4)
MCH RBC QN AUTO: 30.8 PG (ref 26–34)
MCHC RBC AUTO-ENTMCNC: 34.6 G/DL (ref 31–36)
MCV RBC AUTO: 89 FL (ref 80–100)
MONOCYTES ABSOLUTE: 1.2 K/UL (ref 0–1.3)
MONOCYTES RELATIVE PERCENT: 11.4 %
NEUTROPHILS ABSOLUTE: 6.7 K/UL (ref 1.7–7.7)
NEUTROPHILS RELATIVE PERCENT: 63.9 %
PDW BLD-RTO: 13.7 % (ref 12.4–15.4)
PLATELET # BLD: 274 K/UL (ref 135–450)
PMV BLD AUTO: 9.7 FL (ref 5–10.5)
POTASSIUM SERPL-SCNC: 4.6 MMOL/L (ref 3.5–5.1)
RBC # BLD: 4.47 M/UL (ref 4.2–5.9)
SODIUM BLD-SCNC: 140 MMOL/L (ref 136–145)
WBC # BLD: 10.5 K/UL (ref 4–11)

## 2023-03-05 PROCEDURE — 6370000000 HC RX 637 (ALT 250 FOR IP): Performed by: NURSE PRACTITIONER

## 2023-03-05 PROCEDURE — 83735 ASSAY OF MAGNESIUM: CPT

## 2023-03-05 PROCEDURE — 6370000000 HC RX 637 (ALT 250 FOR IP): Performed by: INTERNAL MEDICINE

## 2023-03-05 PROCEDURE — 2580000003 HC RX 258: Performed by: HOSPITALIST

## 2023-03-05 PROCEDURE — 2580000003 HC RX 258: Performed by: INTERNAL MEDICINE

## 2023-03-05 PROCEDURE — 80048 BASIC METABOLIC PNL TOTAL CA: CPT

## 2023-03-05 PROCEDURE — 2140000000 HC CCU INTERMEDIATE R&B

## 2023-03-05 PROCEDURE — 85025 COMPLETE CBC W/AUTO DIFF WBC: CPT

## 2023-03-05 PROCEDURE — 6370000000 HC RX 637 (ALT 250 FOR IP): Performed by: CLINICAL NURSE SPECIALIST

## 2023-03-05 PROCEDURE — 99233 SBSQ HOSP IP/OBS HIGH 50: CPT | Performed by: CLINICAL NURSE SPECIALIST

## 2023-03-05 RX ORDER — DIGOXIN 125 MCG
125 TABLET ORAL DAILY
Status: DISCONTINUED | OUTPATIENT
Start: 2023-03-05 | End: 2023-03-07

## 2023-03-05 RX ADMIN — Medication 5 ML: at 08:17

## 2023-03-05 RX ADMIN — METOPROLOL SUCCINATE 100 MG: 50 TABLET, EXTENDED RELEASE ORAL at 08:16

## 2023-03-05 RX ADMIN — FUROSEMIDE 40 MG: 40 TABLET ORAL at 08:12

## 2023-03-05 RX ADMIN — ATORVASTATIN CALCIUM 40 MG: 40 TABLET, FILM COATED ORAL at 19:33

## 2023-03-05 RX ADMIN — ASPIRIN 81 MG: 81 TABLET, COATED ORAL at 08:16

## 2023-03-05 RX ADMIN — CLOPIDOGREL BISULFATE 75 MG: 75 TABLET ORAL at 08:16

## 2023-03-05 RX ADMIN — DIGOXIN 125 MCG: 125 TABLET ORAL at 08:21

## 2023-03-05 RX ADMIN — FUROSEMIDE 40 MG: 40 TABLET ORAL at 18:09

## 2023-03-05 RX ADMIN — SPIRONOLACTONE 25 MG: 25 TABLET ORAL at 08:17

## 2023-03-05 RX ADMIN — Medication 10 ML: at 19:34

## 2023-03-05 RX ADMIN — DIPHENHYDRAMINE HYDROCHLORIDE 25 MG: 25 TABLET ORAL at 12:29

## 2023-03-05 RX ADMIN — Medication 5 ML: at 08:21

## 2023-03-05 RX ADMIN — LISINOPRIL 2.5 MG: 5 TABLET ORAL at 08:16

## 2023-03-05 ASSESSMENT — PAIN SCALES - GENERAL: PAINLEVEL_OUTOF10: 0

## 2023-03-05 NOTE — PROGRESS NOTES
[facility]    3/5/2023 12:56 PM        Name: Rima Hernandez             Admitted: 3/1/2023  Primary Care Provider: Soha Brown MD (Tel: [phone])      Subjective:   Rima Hernandez is a 79 y.o. male with a past medical history of hypertension, ICA 8/2013 right occipitoparietal who presented with chest pain and SOB x 1 week. Reported having trouble walking. Admits to blood-tinged sputum a few days ago. Covid negative and flu negative    Interval History: Today, he is resting in bed comfortably. HR remains 100-120's at rest.  Cards have added digoxin. Plans for ARAMIS/DCCV early this week. Off therapeutic AC for staged PCI tomorrow. Denies CP, SOB, swelling. No GI/ complaints. Independently reviewed interval ancillary notes from cardiology. Problem List  Principal Problem:    Atrial fibrillation with rapid ventricular response (HCC)  Active Problems:    Acute systolic heart failure (HCC)    Moderate to severe mitral regurgitation    History of CVA (cerebrovascular accident)    Community acquired pneumonia  Resolved Problems:    * No resolved hospital problems.  *    Assessment and Plan:    Acute Systolic HF   - Appears compensated today  - CXR with cardiomegaly and mild pulmonary edema   - Continue PO lasix 40 mg bid, lisinopril added, and laurent added   - EF 25 %  Atrial Fibrillation RVR   - Remains in AF rates improved on dilt gtt    - Toprol 75 mg daily, increase to 100 mg daily and digoxin can be added if remains tachycardic per cards   - Hx of ICH, therapeutic AC is on hold for staged PCI Monday, consider resuming 3/6 per cards,    - Echo with severe LV dysfunction with EF 25% and severe MR   - Appreciate cardiology recs, prefer no diltiazem IV   CAD   - S/p LHC with stent to mid RCA   - No reports of angina, SOB improved   - Continue DAPT, BB and statin  Hypertension   - BP elevated, Toprol increased, needs GDMT will gradually titrate  Hx of ICA   - Hx of ICH 5/2013 and again 8/2013 in same region   - CT head with no acute abnormality or hemorrhage     - PO digoxin started per cards rec  - NPO at midnight for LHC and possible ARAMIS/DCCV    Discussed with Abe Arnett today    Discussed care with patient and nursing  All pertinent updates reviewed with attending physician. Diet: Diet NPO  ADULT DIET; Regular; 4 carb choices (60 gm/meal);  Low Fat/Low Chol/High Fiber/2 gm Na  Code:Full Code  DVT PPX: Lovenox PPx     Disposition: Home when medically able, denies physical limitation or concerns    Current Medications  digoxin (LANOXIN) tablet 125 mcg, Daily  metoprolol succinate (TOPROL XL) extended release tablet 100 mg, Daily  potassium chloride (KLOR-CON M) extended release tablet 40 mEq, PRN   Or  potassium bicarb-citric acid (EFFER-K) effervescent tablet 40 mEq, PRN   Or  potassium chloride 10 mEq/100 mL IVPB (Peripheral Line), PRN  diphenhydrAMINE (BENADRYL) tablet 25 mg, Q6H PRN  digoxin (LANOXIN) injection 250 mcg, Once  aspirin EC tablet 81 mg, Daily  atorvastatin (LIPITOR) tablet 40 mg, Nightly  spironolactone (ALDACTONE) tablet 25 mg, Daily  clopidogrel (PLAVIX) tablet 75 mg, Daily  sodium chloride flush 0.9 % injection 5-40 mL, 2 times per day  sodium chloride flush 0.9 % injection 5-40 mL, PRN  0.9 % sodium chloride infusion, PRN  acetaminophen (TYLENOL) tablet 650 mg, Q4H PRN  lisinopril (PRINIVIL;ZESTRIL) tablet 2.5 mg, Daily  furosemide (LASIX) tablet 40 mg, BID  clonazePAM (KLONOPIN) tablet 0.5 mg, Q12H PRN  oxyCODONE (OXY-IR) immediate release tablet 15 mg, TID PRN  perflutren lipid microspheres (DEFINITY) injection 1.5 mL, ONCE PRN  sodium chloride flush 0.9 % injection 5-40 mL, 2 times per day  sodium chloride flush 0.9 % injection 5-40 mL, PRN  0.9 % sodium chloride infusion, PRN  ondansetron (ZOFRAN-ODT) disintegrating tablet 4 mg, Q8H PRN   Or  ondansetron (ZOFRAN) injection 4 mg, Q6H PRN  polyethylene glycol (GLYCOLAX) packet 17 g, Daily PRN  acetaminophen (TYLENOL) suppository 650 mg, Q6H PRN      Objective:  /75   Pulse (!) 119   Temp 97.6 °F (36.4 °C) (Temporal)   Resp 16   Ht 6' (1.829 m)   Wt 153 lb 14.1 oz (69.8 kg)   SpO2 97%   BMI 20.87 kg/m²   Vitals:    03/05/23 0812   BP: 121/75   Pulse: (!) 119   Resp:    Temp:    SpO2:        Intake/Output Summary (Last 24 hours) at 3/5/2023 1256  Last data filed at 3/5/2023 6871  Gross per 24 hour   Intake 245 ml   Output --   Net 245 ml        Wt Readings from Last 3 Encounters:   03/05/23 153 lb 14.1 oz (69.8 kg)   05/24/22 165 lb (74.8 kg)   05/06/20 178 lb (80.7 kg)     Review of Systems:  Constitutional: Negative for fever, weight changes, or weakness  Skin: Negative for bruising, bleeding, blood clots, or changes in skin pigment  HEENT: Negative for vision changes or dysphagia  Respiratory: Reviewed in HPI  Cardiovascular: Reviewed in HPI  Gastrointestinal: Negative for abdominal pain, N/V/D, constipation, or black/tarry stools  Genito-Urinary: Negative for hematuria  Musculoskeletal: No focal weakness  Neurological/Psych: Negative for confusion or TIA-like symptoms. No anxiety, depression, or insomnia    Physical Examination:  Telemetry: Personally Reviewed Atrial fibrillation w/ RVR  Constitutional: Cooperative and in no apparent distress, appears well nourished, No obesity  Skin: Warm and pink; no cyanosis, bruising, or clubbing, No lesions/incisions + right radial puncture site CDI, no hematoma or oozing  HEENT: Symmetric and normocephalic. Conjunctiva pink with clear sclera. Mucus membranes pink and moist.   Cardiovascular: irregular rate and rhythm. S1 & S2, positive for murmurs. Peripheral pulses 2+, No peripheral edema + tachycardia  Respiratory: Respirations symmetric and unlabored. Lungs clear to auscultation bilaterally, no wheezing, crackles, or rhonchi   Gastrointestinal: Abdomen soft and round. normal bowel sounds.   No tenderness  Musculoskeletal: No focal weakness, muscle strength 5/5 bilaterally  Neurologic/Psych: Awake and orientated to person, place and time. Calm affect, appropriate mood. Pertinent labs, diagnostic, and imaging results reviewed as a part of this visit    Labs and Tests:  CBC:   Recent Labs     03/03/23 0448 03/04/23 0330 03/05/23  0540   WBC 8.6 9.3 10.5   HGB 11.7* 12.6* 13.8    226 274       BMP:    Recent Labs     03/03/23 0448 03/04/23  0330 03/05/23  0540    140 140   K 4.0 3.5 4.6    103 105   CO2 25 25 27   BUN 21* 20 21*   CREATININE 1.3 1.2 1.0   GLUCOSE 136* 166* 101*       Hepatic:   No results for input(s): AST, ALT, ALB, BILITOT, ALKPHOS in the last 72 hours. Relevant results:  CXR: 3/3/2023  Cardiomegaly with mild pulmonary venous congestion. CTPE: 3/1/2023  No evidence of pulmonary embolism. Mild infiltrate noted in the the left lower lobe, which may represent   pneumonia. ECG: 3/1/2023  AF with RVR    Echo: 3/2/2023   Summary   -Atrial fibrillation throughout the study.   -Left ventricular systolic function is reduced with ejection fraction   estimated at 25 %. -Global hypokinesis. .   -Sigmoid septum is present with normal thickness of remaining left ventricular wall segments.   -Left ventricular size is mildly increased. -Bi-atrial enlargement.   -Aortic valve appears sclerotic but opens adequately. -Mitral valve leaflets appear mildly thickened. -Mild mitral valve prolapse of the anterior leaflet.   -Severe mitral regurgitation with posteriorly directed jet. -Mild tricuspid regurgitation. -IVC size is dilated (>2.1 cm) but collapses > 50% with respiration   consistent with elevated RA pressure (8 mmHg). -The ascending aorta is mildly dilated.  3.8 cm    Left Heart Cath 3/3/2023  Dominance: Co      LM: 20% proximal   LAD: 40% mid stenosis; 30% proximal first diagonal   LCx: large vessel with moderate sized obtuse marginals (85% proximal stenosis of OM1, 40% proximal stenosis of OM2), 60% distal lesion  RCA: 40% proximal, 99% mid, 30% distal stenoses; SILVIA II flow beyond critical lesion      LVEDP: 7 mmHg  LVEF: 20-25%     6 Fr JR4 Guide  Boris Blue wire  6 Fr Guideliner     3.0 x 15 mm Trek predil     3.5 x 23 mm Xience WILFREDO deployed to 16 AHWA     0% residual, SILVIA III flow of distal RCA      Given significant restlessness throughout and eventual need for arm and head restraints, decision was made to end procedure with good PCI result before potential for patient harming oneself or requiring general anesthesia. Impression/Recommendations  S/P Successful PCI to critical mid RCA with one drug eluting stent. Plavix loaded in lab and recommended for at least 3 months. Therapeutic Lovenox can be continued prior to day of staged PCI-OM, tentatively 3/6/2023 (consider anesthesia). EP decision regarding ARAMIS/DCCV and AC following. Avoid triple therapy given history of ICH. Moderate intensity statin.    Guideline directed beta blocker, ACE inhibitor and aldosterone antagonist.     Jose Villasenor, GOLDEN - CNP   3/5/2023 12:56 PM

## 2023-03-05 NOTE — PROGRESS NOTES
[facility]   Daily Progress Note      Admit Date:  3/1/2023    HPI:    Mr. 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 He was not aware of irregular HR, found to be in AF with RVR. He was started on IV cardizem. Procalcitonin 0.07       Subjective:  Patient is being seen for new systolic heart failure. There were no acute overnight cardiac events. He received a dose of IV digoxin yesterday HR in the 100s but when he is up will sometimes get into the 120s. He feels good, no chest pain or shortness of breath     Creat 1.3-1.2-1.0 weight 170-169 bnp     Objective:   BP (!) 150/92   Pulse 99   Temp 97.6 °F (36.4 °C) (Temporal)   Resp 16   Ht 6' (1.829 m)   Wt 153 lb 14.1 oz (69.8 kg)   SpO2 98%   BMI 20.87 kg/m²     Intake/Output Summary (Last 24 hours) at 3/5/2023 0745  Last data filed at 3/4/2023 2012  Gross per 24 hour   Intake 240 ml   Output --   Net 240 ml          Physical Exam:  General:  Awake, alert, oriented in NAD  Skin:  Warm and dry. No unusual bruising or rash  Neck:  Supple. No JVD or carotid bruit appreciated  Chest:  Normal effort.   Clear to auscultation, no wheezes/rhonchi/rales  Cardiovascular:  AF, S1/S2, no murmur/gallop/rub  Abdomen:  Soft, nontender, +bowel sounds  Extremities:  No edema, right radial stick without bleeding  Neurological: No focal deficits  Psychological: Normal mood and affect      Medications:    metoprolol succinate  100 mg Oral Daily    digoxin  250 mcg IntraVENous Once    aspirin  81 mg Oral Daily    atorvastatin  40 mg Oral Nightly    spironolactone  25 mg Oral Daily    clopidogrel  75 mg Oral Daily    sodium chloride flush  5-40 mL IntraVENous 2 times per day    lisinopril  2.5 mg Oral Daily    furosemide  40 mg Oral BID    sodium chloride flush  5-40 mL IntraVENous 2 times per day      sodium chloride      sodium chloride         Lab Data:  CBC:   Recent Labs     03/03/23  0448 03/04/23  0330 03/05/23  0540   WBC 8.6 9.3 10.5   HGB 11.7* 12.6* 13.8    226 274     BMP:    Recent Labs     03/03/23  0448 03/04/23  0330 03/05/23  0540    140 140   K 4.0 3.5 4.6   CO2 25 25 27   BUN 21* 20 21*   CREATININE 1.3 1.2 1.0     INR:  No results for input(s): INR in the last 72 hours.  BNP:    Recent Labs     03/04/23  0330   PROBNP 7,529*         Diagnostics:  [facility] Dr Veliz 3/3/2023  Left Heart Cath  Dominance: Co      LM: 20% proximal   LAD: 40% mid stenosis; 30% proximal first diagonal   LCx: large vessel with moderate sized obtuse marginals (85% proximal stenosis of OM1, 40% proximal stenosis of OM2), 60% distal lesion  RCA: 40% proximal, 99% mid, 30% distal stenoses; SILVIA II flow beyond critical lesion      LVEDP: 7 mmHg  LVEF: 20-25%     6 Fr JR4 Guide  Boris Blue wire  6 Fr Guideliner     3.0 x 15 mm Trek predil     3.5 x 23 mm Xience WILFREDO deployed to 16 HAWA     0% residual, SILVIA III flow of distal RCA      Given significant restlessness throughout and eventual need for arm and head restraints, decision was made to end procedure with good PCI result before potential for patient harming oneself or requiring general anesthesia.      Impression/Recommendations  S/P Successful PCI to critical mid RCA with one drug eluting stent.  Plavix loaded in lab and recommended for at least 3 months.  Therapeutic Lovenox can be continued prior to day of staged PCI-OM, tentatively 3/6/2023 (consider anesthesia). EP decision regarding ARAMIS/DCCV and AC following.  Avoid triple therapy given history of ICH.   Moderate intensity statin.   Guideline directed beta blocker, ACE inhibitor and aldosterone antagonist.       Echo 3/2/2023  Summary   -Atrial fibrillation throughout the study.   -Left ventricular systolic function is reduced with ejection fraction   estimated at 25 %.    -Global hypokinesis. .   -Sigmoid septum is present with normal thickness of remaining left   ventricular wall segments.   -Left ventricular size is mildly increased. -Bi-atrial enlargement.   -Aortic valve appears sclerotic but opens adequately. -Mitral valve leaflets appear mildly thickened. -Mild mitral valve prolapse of the anterior leaflet.   -Severe mitral regurgitation with posteriorly directed jet. -Mild tricuspid regurgitation. -IVC size is dilated (>2.1 cm) but collapses > 50% with respiration   consistent with elevated RA pressure (8 mmHg). -The ascending aorta is mildly dilated. 3.8 cm    Assessment:    1. AF with RVR  2. Acute systolic heart failure, on bb  3. Moderate to severe mitral regurgitation   4. History of CVA      Plan:    Continue lisinopril 2.5 mg once a day  Continue toprol 100 mg daily  Will start digoxin 125 mcg daily  Continue lasix 40 mg po twice a day  CHF nurse following for diet education, fluid restriction and daily weights  Consider adding sglt2  after staged cath on monday  Continue aldactone 25 mg once a day  Npo after MN for [facility] tomorrow and CV      NYHA II    Discussed with patient who is agreeable with plan of care. Thank you for allowing me to participate in the care of your patient.     GOLDEN Scott - CNS, CNS

## 2023-03-06 ENCOUNTER — APPOINTMENT (OUTPATIENT)
Dept: CARDIAC CATH/INVASIVE PROCEDURES | Age: 68
DRG: 246 | End: 2023-03-06
Payer: MEDICARE

## 2023-03-06 ENCOUNTER — ANESTHESIA (OUTPATIENT)
Dept: CARDIAC CATH/INVASIVE PROCEDURES | Age: 68
DRG: 246 | End: 2023-03-06
Payer: MEDICARE

## 2023-03-06 ENCOUNTER — ANESTHESIA EVENT (OUTPATIENT)
Dept: CARDIAC CATH/INVASIVE PROCEDURES | Age: 68
DRG: 246 | End: 2023-03-06
Payer: MEDICARE

## 2023-03-06 PROBLEM — I50.20 HFREF (HEART FAILURE WITH REDUCED EJECTION FRACTION) (HCC): Status: ACTIVE | Noted: 2023-03-06

## 2023-03-06 LAB
ANION GAP SERPL CALCULATED.3IONS-SCNC: 10 MMOL/L (ref 3–16)
BUN BLDV-MCNC: 28 MG/DL (ref 7–20)
CALCIUM SERPL-MCNC: 9.9 MG/DL (ref 8.3–10.6)
CHLORIDE BLD-SCNC: 102 MMOL/L (ref 99–110)
CO2: 28 MMOL/L (ref 21–32)
CREAT SERPL-MCNC: 1.1 MG/DL (ref 0.8–1.3)
GFR SERPL CREATININE-BSD FRML MDRD: >60 ML/MIN/{1.73_M2}
GLUCOSE BLD-MCNC: 103 MG/DL (ref 70–99)
HCT VFR BLD CALC: 44.5 % (ref 40.5–52.5)
HEMOGLOBIN: 15 G/DL (ref 13.5–17.5)
MCH RBC QN AUTO: 30.1 PG (ref 26–34)
MCHC RBC AUTO-ENTMCNC: 33.7 G/DL (ref 31–36)
MCV RBC AUTO: 89.2 FL (ref 80–100)
PDW BLD-RTO: 13.6 % (ref 12.4–15.4)
PLATELET # BLD: 294 K/UL (ref 135–450)
PMV BLD AUTO: 9.5 FL (ref 5–10.5)
POC ACT LR: 202 SEC
POC ACT LR: 229 SEC
POC ACT LR: 255 SEC
POTASSIUM SERPL-SCNC: 4.6 MMOL/L (ref 3.5–5.1)
RBC # BLD: 4.99 M/UL (ref 4.2–5.9)
SODIUM BLD-SCNC: 140 MMOL/L (ref 136–145)
WBC # BLD: 11.4 K/UL (ref 4–11)

## 2023-03-06 PROCEDURE — 6370000000 HC RX 637 (ALT 250 FOR IP): Performed by: CLINICAL NURSE SPECIALIST

## 2023-03-06 PROCEDURE — 3700000001 HC ADD 15 MINUTES (ANESTHESIA)

## 2023-03-06 PROCEDURE — 92920 PRQ TRLUML C ANGIOP 1ART&/BR: CPT | Performed by: INTERNAL MEDICINE

## 2023-03-06 PROCEDURE — 93454 CORONARY ARTERY ANGIO S&I: CPT

## 2023-03-06 PROCEDURE — C1769 GUIDE WIRE: HCPCS

## 2023-03-06 PROCEDURE — 99152 MOD SED SAME PHYS/QHP 5/>YRS: CPT | Performed by: INTERNAL MEDICINE

## 2023-03-06 PROCEDURE — 2580000003 HC RX 258

## 2023-03-06 PROCEDURE — 92928 PRQ TCAT PLMT NTRAC ST 1 LES: CPT | Performed by: INTERNAL MEDICINE

## 2023-03-06 PROCEDURE — 80048 BASIC METABOLIC PNL TOTAL CA: CPT

## 2023-03-06 PROCEDURE — 027337Z DILATION OF CORONARY ARTERY, FOUR OR MORE ARTERIES WITH FOUR OR MORE DRUG-ELUTING INTRALUMINAL DEVICES, PERCUTANEOUS APPROACH: ICD-10-PCS | Performed by: INTERNAL MEDICINE

## 2023-03-06 PROCEDURE — 92929 PR PRQ TRLUML CORONARY STENT W/ANGIO ADDL ART/BRNCH: CPT | Performed by: INTERNAL MEDICINE

## 2023-03-06 PROCEDURE — 6370000000 HC RX 637 (ALT 250 FOR IP): Performed by: INTERNAL MEDICINE

## 2023-03-06 PROCEDURE — C1887 CATHETER, GUIDING: HCPCS

## 2023-03-06 PROCEDURE — 6360000002 HC RX W HCPCS: Performed by: NURSE ANESTHETIST, CERTIFIED REGISTERED

## 2023-03-06 PROCEDURE — 2140000000 HC CCU INTERMEDIATE R&B

## 2023-03-06 PROCEDURE — 6360000002 HC RX W HCPCS

## 2023-03-06 PROCEDURE — 4A023N7 MEASUREMENT OF CARDIAC SAMPLING AND PRESSURE, LEFT HEART, PERCUTANEOUS APPROACH: ICD-10-PCS | Performed by: INTERNAL MEDICINE

## 2023-03-06 PROCEDURE — 3700000000 HC ANESTHESIA ATTENDED CARE

## 2023-03-06 PROCEDURE — C1894 INTRO/SHEATH, NON-LASER: HCPCS

## 2023-03-06 PROCEDURE — C1874 STENT, COATED/COV W/DEL SYS: HCPCS

## 2023-03-06 PROCEDURE — 85347 COAGULATION TIME ACTIVATED: CPT

## 2023-03-06 PROCEDURE — 93571 IV DOP VEL&/PRESS C FLO 1ST: CPT | Performed by: INTERNAL MEDICINE

## 2023-03-06 PROCEDURE — C1725 CATH, TRANSLUMIN NON-LASER: HCPCS

## 2023-03-06 PROCEDURE — 6370000000 HC RX 637 (ALT 250 FOR IP): Performed by: NURSE PRACTITIONER

## 2023-03-06 PROCEDURE — 93454 CORONARY ARTERY ANGIO S&I: CPT | Performed by: INTERNAL MEDICINE

## 2023-03-06 PROCEDURE — 2500000003 HC RX 250 WO HCPCS

## 2023-03-06 PROCEDURE — C9601 PERC DRUG-EL COR STENT BRAN: HCPCS

## 2023-03-06 PROCEDURE — 92920 PRQ TRLUML C ANGIOP 1ART&/BR: CPT

## 2023-03-06 PROCEDURE — 6360000004 HC RX CONTRAST MEDICATION: Performed by: INTERNAL MEDICINE

## 2023-03-06 PROCEDURE — 2580000003 HC RX 258: Performed by: INTERNAL MEDICINE

## 2023-03-06 PROCEDURE — 93571 IV DOP VEL&/PRESS C FLO 1ST: CPT

## 2023-03-06 PROCEDURE — C9600 PERC DRUG-EL COR STENT SING: HCPCS

## 2023-03-06 PROCEDURE — B2111ZZ FLUOROSCOPY OF MULTIPLE CORONARY ARTERIES USING LOW OSMOLAR CONTRAST: ICD-10-PCS | Performed by: INTERNAL MEDICINE

## 2023-03-06 PROCEDURE — 85027 COMPLETE CBC AUTOMATED: CPT

## 2023-03-06 PROCEDURE — 2580000003 HC RX 258: Performed by: NURSE ANESTHETIST, CERTIFIED REGISTERED

## 2023-03-06 PROCEDURE — 2500000003 HC RX 250 WO HCPCS: Performed by: NURSE ANESTHETIST, CERTIFIED REGISTERED

## 2023-03-06 RX ORDER — SODIUM CHLORIDE 0.9 % (FLUSH) 0.9 %
5-40 SYRINGE (ML) INJECTION EVERY 12 HOURS SCHEDULED
Status: DISCONTINUED | OUTPATIENT
Start: 2023-03-06 | End: 2023-03-07 | Stop reason: HOSPADM

## 2023-03-06 RX ORDER — SODIUM CHLORIDE, SODIUM LACTATE, POTASSIUM CHLORIDE, CALCIUM CHLORIDE 600; 310; 30; 20 MG/100ML; MG/100ML; MG/100ML; MG/100ML
INJECTION, SOLUTION INTRAVENOUS CONTINUOUS PRN
Status: DISCONTINUED | OUTPATIENT
Start: 2023-03-06 | End: 2023-03-06 | Stop reason: SDUPTHER

## 2023-03-06 RX ORDER — ATORVASTATIN CALCIUM 80 MG/1
80 TABLET, FILM COATED ORAL NIGHTLY
Status: DISCONTINUED | OUTPATIENT
Start: 2023-03-06 | End: 2023-03-07 | Stop reason: HOSPADM

## 2023-03-06 RX ORDER — HEPARIN SODIUM 1000 [USP'U]/ML
INJECTION, SOLUTION INTRAVENOUS; SUBCUTANEOUS PRN
Status: DISCONTINUED | OUTPATIENT
Start: 2023-03-06 | End: 2023-03-06 | Stop reason: SDUPTHER

## 2023-03-06 RX ORDER — ONDANSETRON 2 MG/ML
4 INJECTION INTRAMUSCULAR; INTRAVENOUS EVERY 6 HOURS PRN
Status: DISCONTINUED | OUTPATIENT
Start: 2023-03-06 | End: 2023-03-07 | Stop reason: HOSPADM

## 2023-03-06 RX ORDER — OXYCODONE HYDROCHLORIDE AND ACETAMINOPHEN 5; 325 MG/1; MG/1
1 TABLET ORAL EVERY 4 HOURS PRN
Status: DISCONTINUED | OUTPATIENT
Start: 2023-03-06 | End: 2023-03-07 | Stop reason: HOSPADM

## 2023-03-06 RX ORDER — SODIUM CHLORIDE 0.9 % (FLUSH) 0.9 %
5-40 SYRINGE (ML) INJECTION PRN
Status: DISCONTINUED | OUTPATIENT
Start: 2023-03-06 | End: 2023-03-07 | Stop reason: HOSPADM

## 2023-03-06 RX ORDER — LIDOCAINE HYDROCHLORIDE 20 MG/ML
INJECTION, SOLUTION EPIDURAL; INFILTRATION; INTRACAUDAL; PERINEURAL PRN
Status: DISCONTINUED | OUTPATIENT
Start: 2023-03-06 | End: 2023-03-06 | Stop reason: SDUPTHER

## 2023-03-06 RX ORDER — PROPOFOL 10 MG/ML
INJECTION, EMULSION INTRAVENOUS PRN
Status: DISCONTINUED | OUTPATIENT
Start: 2023-03-06 | End: 2023-03-06 | Stop reason: SDUPTHER

## 2023-03-06 RX ORDER — SODIUM CHLORIDE 9 MG/ML
INJECTION, SOLUTION INTRAVENOUS CONTINUOUS
Status: ACTIVE | OUTPATIENT
Start: 2023-03-06 | End: 2023-03-06

## 2023-03-06 RX ORDER — ACETAMINOPHEN 325 MG/1
650 TABLET ORAL EVERY 4 HOURS PRN
Status: DISCONTINUED | OUTPATIENT
Start: 2023-03-06 | End: 2023-03-07 | Stop reason: HOSPADM

## 2023-03-06 RX ORDER — SODIUM CHLORIDE 9 MG/ML
INJECTION, SOLUTION INTRAVENOUS PRN
Status: DISCONTINUED | OUTPATIENT
Start: 2023-03-06 | End: 2023-03-07 | Stop reason: HOSPADM

## 2023-03-06 RX ORDER — OXYCODONE HYDROCHLORIDE AND ACETAMINOPHEN 5; 325 MG/1; MG/1
2 TABLET ORAL EVERY 4 HOURS PRN
Status: DISCONTINUED | OUTPATIENT
Start: 2023-03-06 | End: 2023-03-07 | Stop reason: HOSPADM

## 2023-03-06 RX ORDER — PHENYLEPHRINE HYDROCHLORIDE 10 MG/ML
INJECTION INTRAVENOUS PRN
Status: DISCONTINUED | OUTPATIENT
Start: 2023-03-06 | End: 2023-03-06 | Stop reason: SDUPTHER

## 2023-03-06 RX ORDER — ONDANSETRON 2 MG/ML
INJECTION INTRAMUSCULAR; INTRAVENOUS PRN
Status: DISCONTINUED | OUTPATIENT
Start: 2023-03-06 | End: 2023-03-06 | Stop reason: SDUPTHER

## 2023-03-06 RX ADMIN — CLOPIDOGREL BISULFATE 75 MG: 75 TABLET ORAL at 09:10

## 2023-03-06 RX ADMIN — LISINOPRIL 2.5 MG: 5 TABLET ORAL at 09:10

## 2023-03-06 RX ADMIN — DIGOXIN 125 MCG: 125 TABLET ORAL at 09:09

## 2023-03-06 RX ADMIN — HEPARIN SODIUM 1000 UNITS: 1000 INJECTION INTRAVENOUS; SUBCUTANEOUS at 09:59

## 2023-03-06 RX ADMIN — SODIUM CHLORIDE: 9 INJECTION, SOLUTION INTRAVENOUS at 11:17

## 2023-03-06 RX ADMIN — FUROSEMIDE 40 MG: 40 TABLET ORAL at 18:09

## 2023-03-06 RX ADMIN — PROPOFOL 100 MG: 10 INJECTION, EMULSION INTRAVENOUS at 10:20

## 2023-03-06 RX ADMIN — HEPARIN SODIUM 3000 UNITS: 1000 INJECTION INTRAVENOUS; SUBCUTANEOUS at 10:47

## 2023-03-06 RX ADMIN — ONDANSETRON 4 MG: 2 INJECTION INTRAMUSCULAR; INTRAVENOUS at 10:55

## 2023-03-06 RX ADMIN — SODIUM CHLORIDE, PRESERVATIVE FREE 10 ML: 5 INJECTION INTRAVENOUS at 20:22

## 2023-03-06 RX ADMIN — PHENYLEPHRINE HYDROCHLORIDE 200 MCG: 10 INJECTION INTRAVENOUS at 10:20

## 2023-03-06 RX ADMIN — PHENYLEPHRINE HYDROCHLORIDE 100 MCG: 10 INJECTION INTRAVENOUS at 09:45

## 2023-03-06 RX ADMIN — ASPIRIN 81 MG: 81 TABLET, COATED ORAL at 09:09

## 2023-03-06 RX ADMIN — PHENYLEPHRINE HYDROCHLORIDE 100 MCG: 10 INJECTION INTRAVENOUS at 09:34

## 2023-03-06 RX ADMIN — PROPOFOL 60 MG: 10 INJECTION, EMULSION INTRAVENOUS at 09:33

## 2023-03-06 RX ADMIN — LIDOCAINE HYDROCHLORIDE 40 MG: 20 INJECTION, SOLUTION EPIDURAL; INFILTRATION; INTRACAUDAL; PERINEURAL at 10:55

## 2023-03-06 RX ADMIN — PHENYLEPHRINE HYDROCHLORIDE 100 MCG: 10 INJECTION INTRAVENOUS at 09:41

## 2023-03-06 RX ADMIN — METOPROLOL SUCCINATE 100 MG: 50 TABLET, EXTENDED RELEASE ORAL at 09:10

## 2023-03-06 RX ADMIN — PHENYLEPHRINE HYDROCHLORIDE 300 MCG: 10 INJECTION INTRAVENOUS at 09:53

## 2023-03-06 RX ADMIN — PHENYLEPHRINE HYDROCHLORIDE 200 MCG: 10 INJECTION INTRAVENOUS at 09:48

## 2023-03-06 RX ADMIN — ATORVASTATIN CALCIUM 80 MG: 80 TABLET, FILM COATED ORAL at 20:21

## 2023-03-06 RX ADMIN — HEPARIN SODIUM 1000 UNITS: 1000 INJECTION INTRAVENOUS; SUBCUTANEOUS at 10:23

## 2023-03-06 RX ADMIN — PHENYLEPHRINE HYDROCHLORIDE 30 MCG/MIN: 10 INJECTION INTRAVENOUS at 09:54

## 2023-03-06 RX ADMIN — SPIRONOLACTONE 25 MG: 25 TABLET ORAL at 09:09

## 2023-03-06 RX ADMIN — IOPAMIDOL 140 ML: 755 INJECTION, SOLUTION INTRAVENOUS at 11:07

## 2023-03-06 RX ADMIN — SODIUM CHLORIDE, POTASSIUM CHLORIDE, SODIUM LACTATE AND CALCIUM CHLORIDE: 600; 310; 30; 20 INJECTION, SOLUTION INTRAVENOUS at 09:27

## 2023-03-06 RX ADMIN — LIDOCAINE HYDROCHLORIDE 60 MG: 20 INJECTION, SOLUTION EPIDURAL; INFILTRATION; INTRACAUDAL; PERINEURAL at 09:33

## 2023-03-06 RX ADMIN — APIXABAN 5 MG: 5 TABLET, FILM COATED ORAL at 20:21

## 2023-03-06 RX ADMIN — PROPOFOL 60 MG: 10 INJECTION, EMULSION INTRAVENOUS at 09:34

## 2023-03-06 ASSESSMENT — PAIN SCALES - GENERAL
PAINLEVEL_OUTOF10: 0

## 2023-03-06 NOTE — CARE COORDINATION
Pt had staged PCI today. Per IDR may need cardioversion. Cm will monitor for any discharge needs.     Breana Rodriguez RN, BSN  [phone]

## 2023-03-06 NOTE — OP NOTE
Patient:  Cesario Parker   :   1955    Procedural Summary  ~Consent:   Obtained written and verbal consent      Risks/benefits explained in detail  ~Procedure:    Left Heart Catheterization  ~Medications:    Procedural sedation with general anesthesia  ~Complications:   None  ~Blood Loss:    <10cc  ~Specimens:    None obtained  ~Pre-sedation re-evaluation: Performed by anesthesia      Cardiac Cath PCI:  Anatomy:   LM-nml   LAD-mid 70-80%  Cx-distal 60%  OM1 prox 85%  OM2 prox 90%  RCA-mid 50% stent under expansion  RPDA- nml    FFR LAD 0.77    Intervention  ~Successful PCI to RCA stent with 3.75x15 NC balloon angioplasty. PCI to OM1 with 2.25x18 WILFREDO. PCI to OM2 with 2.5x23 WILFREDO. PCI to LAD with 3.5x33 WILFREDO. Excellent Result. Contrast: 140  Flouro Time: 11.5  Access: R radial a    Impression  ~Coronary Angiography w/ severe MVD  ~Successful complex angioplasty and stenting of RCA/OM/LAD        Recommendation  ~Aggressive medical treatment and risk factor modification  ~1. Post cath IVF. Bedrest.   2. Recommend beta blocker, ace/arb, high potency statin, eliquis and plavix. No aspirin due to high bleed risk with triple therapy. 3. Referral to outpatient cardiac rehab phase II will be deferred until patient follow-up in office and then determine patient safety and appropriateness to proceed  4. Patient has been advised on the importance of regular exercise of at least 20-30 minutes daily. 5. Patient counseled about and offered assistance for smoking cessation   6.  Plan for ARAMIS/Cardioversion tomorrow am.            Susy Joyce MD, MD 3/6/2023 10:57 AM

## 2023-03-06 NOTE — ANESTHESIA PRE PROCEDURE
Department of Anesthesiology  Preprocedure Note       Name:  Juan Coburn   Age:  79 y.o.  :  1955                                          MRN:  [id number]         Date:  3/6/2023      Surgeon: * No surgeons listed *    Procedure: * No procedures listed *    Medications prior to admission:   Prior to Admission medications    Medication Sig Start Date End Date Taking? Authorizing Provider   propranolol (INDERAL) 20 MG tablet Take 20 mg by mouth daily   Yes Historical Provider, MD   Glucosamine HCl (GLUCOSAMINE PO) Take 1,000 mg by mouth daily   Yes Historical Provider, MD   fluticasone (FLONASE) 50 MCG/ACT nasal spray 2 sprays by Each Nostril route daily   Yes Historical Provider, MD   oxyCODONE (OXY-IR) 15 MG immediate release tablet Take 15 mg by mouth 3 times daily as needed for Pain. Yes Historical Provider, MD   diclofenac sodium (VOLTAREN) 1 % GEL Apply 4 g topically 4 times daily  Patient not taking: Reported on 3/1/2023 5/6/20 6/5/20  ANANTH Humphrey   clonazePAM (KLONOPIN) 0.5 MG tablet Take 1 tablet by mouth 2 times daily  Patient not taking: Reported on 3/1/2023 10/5/17   Jessie Reynoso MD   nystatin (MYCOSTATIN) 506381 UNIT/GM cream Apply topically 2 times daily.   Patient not taking: Reported on 3/1/2023 10/5/17   Jessie Reynoso MD   Avanafil (STENDRA) 200 MG TABS Take 1 tablet by mouth daily  Patient not taking: Reported on 3/1/2023 10/5/17   Jessie Reynoso MD   omeprazole (PRILOSEC) 20 MG delayed release capsule TAKE 1 CAPSULE BY MOUTH DAILY  Patient taking differently: Take 20 mg by mouth Daily 10/5/17   Jessie Reynoso MD   atorvastatin (LIPITOR) 40 MG tablet TAKE 1 TABLET BY MOUTH EVERY DAY 10/5/17   Jessie Reynoso MD   lisinopril (PRINIVIL;ZESTRIL) 20 MG tablet TAKE 1 TABLET BY MOUTH DAILY  Patient not taking: Reported on 3/1/2023 10/5/17   Jessie Reynoso MD   amLODIPine (NORVASC) 10 MG tablet TAKE 1 TABLET BY MOUTH DAILY 10/5/17   Jessie Reynoso MD sildenafil (VIAGRA) 100 MG tablet Take 1 tablet by mouth as needed for Erectile Dysfunction  Patient not taking: Reported on 3/1/2023 6/9/17   Romi Melo MD   tadalafil (CIALIS) 5 MG tablet One po daily for erectile dysfunction  Patient not taking: Reported on 3/1/2023 1/22/16   Romi Melo MD       Current medications:    Current Facility-Administered Medications   Medication Dose Route Frequency Provider Last Rate Last Admin    digoxin (LANOXIN) tablet 125 mcg  125 mcg Oral Daily GOLDEN Jackson - CNS   125 mcg at 03/05/23 3112    metoprolol succinate (TOPROL XL) extended release tablet 100 mg  100 mg Oral Daily Margert Colla, APRN - CNP   100 mg at 03/05/23 0816    potassium chloride (KLOR-CON M) extended release tablet 40 mEq  40 mEq Oral PRN Margert Colla, APRN - CNP   40 mEq at 03/04/23 1743    Or    potassium bicarb-citric acid (EFFER-K) effervescent tablet 40 mEq  40 mEq Oral PRN Margert Colla, APRN - CNP        Or    potassium chloride 10 mEq/100 mL IVPB (Peripheral Line)  10 mEq IntraVENous PRN Margert Colla, APRN - CNP        diphenhydrAMINE (BENADRYL) tablet 25 mg  25 mg Oral Q6H PRN Margert Colla, APRN - CNP   25 mg at 03/05/23 1229    aspirin EC tablet 81 mg  81 mg Oral Daily Arlester Moder, DO   81 mg at 03/05/23 0816    atorvastatin (LIPITOR) tablet 40 mg  40 mg Oral Nightly Arlester Moder, DO   40 mg at 03/05/23 1933    spironolactone (ALDACTONE) tablet 25 mg  25 mg Oral Daily Arlester Moder, DO   25 mg at 03/05/23 0817    clopidogrel (PLAVIX) tablet 75 mg  75 mg Oral Daily Arlester Moder, DO   75 mg at 03/05/23 0816    sodium chloride flush 0.9 % injection 5-40 mL  5-40 mL IntraVENous 2 times per day Arlester Moder, DO   10 mL at 03/05/23 1934    sodium chloride flush 0.9 % injection 5-40 mL  5-40 mL IntraVENous PRN Arlester Moder, DO        0.9 % sodium chloride infusion   IntraVENous PRN Arlester Moder, DO        acetaminophen (TYLENOL) tablet 650 mg  650 mg Oral Q4H PRN Clarissa Cavazos DO        lisinopril (PRINIVIL;ZESTRIL) tablet 2.5 mg  2.5 mg Oral Daily Clarissa Cavazos DO   2.5 mg at 03/05/23 0816    furosemide (LASIX) tablet 40 mg  40 mg Oral BID Carl Graves MD   40 mg at 03/05/23 1809    clonazePAM (KLONOPIN) tablet 0.5 mg  0.5 mg Oral Q12H PRN GOLDEN Suggs - CNP        oxyCODONE (OXY-IR) immediate release tablet 15 mg  15 mg Oral TID PRN Arneta Najjar, MD        perflutren lipid microspheres (DEFINITY) injection 1.5 mL  1.5 mL IntraVENous ONCE PRN Svetlana Van MD        sodium chloride flush 0.9 % injection 5-40 mL  5-40 mL IntraVENous 2 times per day Arneta Najjar, MD   10 mL at 03/05/23 1934    sodium chloride flush 0.9 % injection 5-40 mL  5-40 mL IntraVENous PRN Svetlana Van MD        0.9 % sodium chloride infusion   IntraVENous PRN Arneta Najjar, MD        ondansetron (ZOFRAN-ODT) disintegrating tablet 4 mg  4 mg Oral Q8H PRN Svetlana Van MD        Or    ondansetron (ZOFRAN) injection 4 mg  4 mg IntraVENous Q6H PRN Svetlana Van MD        polyethylene glycol (GLYCOLAX) packet 17 g  17 g Oral Daily PRN Arneta Najjar, MD        acetaminophen (TYLENOL) suppository 650 mg  650 mg Rectal Q6H PRN Arneta Najjar, MD           Allergies:  No Known Allergies    Problem List:    Patient Active Problem List   Diagnosis Code    Headache, chronic daily R51.9    HTN (hypertension) I10    Intracranial bleed (HCC) I62.9    CVA (cerebrovascular accident due to intracerebral hemorrhage) ([facility] [address] I61.9    Atrial fibrillation with rapid ventricular response (HCC) D16.08    Acute systolic heart failure (HCC) I50.21    Moderate to severe mitral regurgitation I34.0    History of CVA (cerebrovascular accident) Z80.78    Community acquired pneumonia J18.9       Past Medical History:        Diagnosis Date    Headache     Hypertension     Intracerebral hemorrhage ([facility] [address] 5/12/2013    Recurrent 8/6/13 - Right occipitoparietal    Neck pain     Unspecified cerebral artery occlusion with cerebral infarction        Past Surgical History:        Procedure Laterality Date    SHOULDER SURGERY         Social History:    Social History     Tobacco Use    Smoking status: Never    Smokeless tobacco: Never   Substance Use Topics    Alcohol use: Not Currently                                Counseling given: Not Answered      Vital Signs (Current):   Vitals:    03/05/23 2300 03/06/23 0004 03/06/23 0411 03/06/23 0800   BP:  (!) 110/96 131/84 129/77   Pulse: 90 96 (!) 116 93   Resp:  16 16 16   Temp:  98.7 °F (37.1 °C) 98.7 °F (37.1 °C) 96.8 °F (36 °C)   TempSrc:  Temporal Temporal Temporal   SpO2:  100% 96% 98%   Weight:   164 lb 14.5 oz (74.8 kg)    Height:   6' (1.829 m)                                               BP Readings from Last 3 Encounters:   03/06/23 129/77   05/24/22 (!) 135/92   10/05/17 132/74       NPO Status:                                                                                 BMI:   Wt Readings from Last 3 Encounters:   03/06/23 164 lb 14.5 oz (74.8 kg)   05/24/22 165 lb (74.8 kg)   05/06/20 178 lb (80.7 kg)     Body mass index is 22.37 kg/m².     CBC:   Lab Results   Component Value Date/Time    WBC 10.5 03/05/2023 05:40 AM    RBC 4.47 03/05/2023 05:40 AM    HGB 13.8 03/05/2023 05:40 AM    HCT 39.8 03/05/2023 05:40 AM    MCV 89.0 03/05/2023 05:40 AM    RDW 13.7 03/05/2023 05:40 AM     03/05/2023 05:40 AM       CMP:   Lab Results   Component Value Date/Time     03/05/2023 05:40 AM    K 4.6 03/05/2023 05:40 AM    K 4.0 03/01/2023 12:33 PM     03/05/2023 05:40 AM    CO2 27 03/05/2023 05:40 AM    BUN 21 03/05/2023 05:40 AM    CREATININE 1.0 03/05/2023 05:40 AM    GFRAA >60 05/24/2022 11:06 AM    GFRAA 109 06/14/2013 08:26 PM    AGRATIO 1.0 03/01/2023 12:33 PM    LABGLOM >60 03/05/2023 05:40 AM    GLUCOSE 101 03/05/2023 05:40 AM    PROT 7.3 03/01/2023 12:33 PM    PROT 6.3 03/29/2012 09:42 AM    CALCIUM 9.5 03/05/2023 05:40 AM    BILITOT 1.0 03/01/2023 12:33 PM    ALKPHOS 120 03/01/2023 12:33 PM    AST 22 03/01/2023 12:33 PM    ALT 18 03/01/2023 12:33 PM       POC Tests: No results for input(s): POCGLU, POCNA, POCK, POCCL, POCBUN, POCHEMO, POCHCT in the last 72 hours.     Coags:   Lab Results   Component Value Date/Time    PROTIME 11.3 08/06/2013 02:05 PM    INR 1.01 08/06/2013 02:05 PM    APTT 26.9 08/06/2013 02:05 PM       HCG (If Applicable): No results found for: PREGTESTUR, PREGSERUM, HCG, HCGQUANT     ABGs: No results found for: PHART, PO2ART, UUH5UQO, NNE2TXA, BEART, J9HZUTZJ     Type & Screen (If Applicable):  No results found for: LABABO, LABRH    Drug/Infectious Status (If Applicable):  No results found for: HIV, HEPCAB    COVID-19 Screening (If Applicable):   Lab Results   Component Value Date/Time    COVID19 Not Detected 03/01/2023 12:56 PM           Anesthesia Evaluation  Patient summary reviewed and Nursing notes reviewed no history of anesthetic complications:   Airway: Mallampati: II  TM distance: >3 FB   Neck ROM: full  Mouth opening: > = 3 FB   Dental:    (+) upper dentures and lower dentures      Pulmonary:       (-) COPD, asthma, rhonchi and wheezes                           Cardiovascular:    (+) hypertension:, valvular problems/murmurs (severe MR):, CAD:, CABG/stent (stent):, dysrhythmias (with RVR): atrial fibrillation, CHF:,         Rhythm: irregular  Rate: abnormal                 ROS comment: Left Ventricular Ejection Fraction 25   LVEF MODALITY ECHO         Narrative & Impression      Transthoracic Echocardiography Report (TTE)      Demographics      Patient Name       Elisha Shaikh!      Date of Study      03/02/2023        Gender              Male      Patient Number     [id number]        Date of Birth       1955      Visit Number       [id number]         Age                 79 year(s)      Accession Number   [id number]        Room Number         7399      Corporate ID [id number]          Zachary Garcia RVT, [facility]      Ordering Physician Ziyad Espinosa MD Interpreting        Titus Abraham MD,                                        Physician           [facility]     Procedure     Type of Study      TTE procedure:ECHOCARDIOGRAM COMPLETE 2D W DOPPLER W COLOR. Procedure Date  Date: 03/02/2023 Start: 01:07 PM     Study Location: [facility]  Technical Quality: Adequate visualization     Indications:Atrial fibrillation.     Patient Status: Routine     Height: 72 inches Weight: 170 pounds BSA: 1.99 m2 BMI: 23.06 kg/m2     HR: 106 bpm BP: 131/92 mmHg      Conclusions      Summary   -Atrial fibrillation throughout the study.   -Left ventricular systolic function is reduced with ejection fraction   estimated at 25 %. -Global hypokinesis. .   -Sigmoid septum is present with normal thickness of remaining left   ventricular wall segments.   -Left ventricular size is mildly increased. -Bi-atrial enlargement.   -Aortic valve appears sclerotic but opens adequately. -Mitral valve leaflets appear mildly thickened. -Mild mitral valve prolapse of the anterior leaflet.   -Severe mitral regurgitation with posteriorly directed jet. -Mild tricuspid regurgitation. -IVC size is dilated (>2.1 cm) but collapses > 50% with respiration   consistent with elevated RA pressure (8 mmHg). -The ascending aorta is mildly dilated.  3.8 cm      Signature      ------------------------------------------------------------------   Electronically signed by Titus Abraham MD, [facility] (Interpreting   physician) on 03/02/2023 at 02:58 PM   ------------------------------------------------------------------                Neuro/Psych:   (+) CVA ([address] 2013):, headaches:,             GI/Hepatic/Renal:            ROS comment: Patient denies gerd sx today or n/v.   Endo/Other:    (+) blood dyscrasia: anticoagulation therapy:., .                 Abdominal:             Vascular: Other Findings:           Anesthesia Plan      general     ASA 4       Induction: intravenous. MIPS: Postoperative opioids intended and Prophylactic antiemetics administered. Anesthetic plan and risks discussed with patient. Plan discussed with CRNA.                     Jonathan Zamarripa MD   3/6/2023

## 2023-03-06 NOTE — PROGRESS NOTES
[address] [place] [address]   Electrophysiology   Date: 3/6/2023  Reason for Consultation: Atrial fibrillation with RVR    Consult Requesting Physician: Anup Alexandra MD     Chief Complaint   Patient presents with    Shortness of Breath     Complaints of shortness of breath x1 week with ambulation, states coughing up blood tinged sputum for a few days, presents with a HR of 150-170, only hx of HTN       CC: Shortness of breath    HPI: Delilah Horton is a 79 y.o. male who has presented to the hospital with shortness of breath, orthopnea. Found to have Afib with RVR, moderate to severe MR and cardiomyopathy. He has history of CVA. He also has had history of right parietal parenchymal bleed x2 in 2013 admitted to [facility] with no intervention. S/p PCI to LAD/OM and RCA    Remains in atrial fibrillation. Occasional episodes of RVR. Assessment:   Atrial fibrillation with rapid ventricular response, new diagnosis  Acute HFrEF, new diagnosis  Moderate to severe mitral regurgitation, new diagnosis  Possible pneumonia  History of CVA  History of ICH    Plan:   High [id number] score, high risk for stroke/thromboembolism. On Eliquis and Plavix per interventional cardiology. Treatment options including ARAMIS/Cardioversion discussed with patient, including risks, benefits and alternative discussed. Risks, benefits and alternative of procedure were explained. All questions answered. Patient understood and would like to proceed. Keep NPO after midnight. Plan for ARAMIS/cardioversion tomorrow around noon time. Will try to get anesthesia if available. He has history of ICH in the past. Long term anticoagulation has risk of ICH. CHENCHO occlusion with watchman can be considered in future. Diuretics with lasix  Close monitoring of volume status, electrolytes(K, NA), renal function   Strict I/Os  Weight dialy.      High risk for recurrent atrial arrhythmia including atrial fibrillation due to enlarged LA size and cardiomyopathy and MR. Severe exacerbation of HFrEF leading to hospitalization    Discussed with nursing staff. Discussed with referring physician. Relevant available labs, and cardiovascular diagnostics, documents reviewed. ECG: Atrial fibrillation with RVR   Echo: none   Stress test:   CXR:  unremarkable     Na: 140  K: 4.6   Pro-BNP: 7529   Hgb: 15       I independently reviewed cardiac tracings / rhythm ECGs strips: noted atrial fibrillation with RVR    Reviewed ECGs. Severe exacerbation of underlying medical condition requiring hospitalization and at risk of decompensation. I independently reviewed relevant and available cardiac diagnostic tests. Scheduled Meds:   atorvastatin  80 mg Oral Nightly    sodium chloride flush  5-40 mL IntraVENous 2 times per day    apixaban  5 mg Oral BID    digoxin  125 mcg Oral Daily    metoprolol succinate  100 mg Oral Daily    spironolactone  25 mg Oral Daily    clopidogrel  75 mg Oral Daily    sodium chloride flush  5-40 mL IntraVENous 2 times per day    lisinopril  2.5 mg Oral Daily    furosemide  40 mg Oral BID    sodium chloride flush  5-40 mL IntraVENous 2 times per day     Continuous Infusions:   sodium chloride 50 mL/hr at 03/06/23 1117    sodium chloride      sodium chloride      sodium chloride       PRN Meds:.sodium chloride flush, sodium chloride, acetaminophen, oxyCODONE-acetaminophen **OR** oxyCODONE-acetaminophen, ondansetron, potassium chloride **OR** potassium alternative oral replacement **OR** potassium chloride, diphenhydrAMINE, sodium chloride flush, sodium chloride, clonazePAM, perflutren lipid microspheres, sodium chloride flush, sodium chloride, ondansetron **OR** [DISCONTINUED] ondansetron, polyethylene glycol, [DISCONTINUED] acetaminophen **OR** acetaminophen     Prior to Admission medications    Medication Sig Start Date End Date Taking?  Authorizing Provider   propranolol (INDERAL) 20 MG tablet Take 20 mg by mouth daily Yes Historical Provider, MD   Glucosamine HCl (GLUCOSAMINE PO) Take 1,000 mg by mouth daily   Yes Historical Provider, MD   fluticasone (FLONASE) 50 MCG/ACT nasal spray 2 sprays by Each Nostril route daily   Yes Historical Provider, MD   oxyCODONE (OXY-IR) 15 MG immediate release tablet Take 15 mg by mouth 3 times daily as needed for Pain. Yes Historical Provider, MD   diclofenac sodium (VOLTAREN) 1 % GEL Apply 4 g topically 4 times daily  Patient not taking: Reported on 3/1/2023 5/6/20 6/5/20  ANANTH Bailey   clonazePAM (KLONOPIN) 0.5 MG tablet Take 1 tablet by mouth 2 times daily  Patient not taking: Reported on 3/1/2023 10/5/17   Vern Blake MD   nystatin (MYCOSTATIN) 238513 UNIT/GM cream Apply topically 2 times daily.   Patient not taking: Reported on 3/1/2023 10/5/17   Vern Blake MD   Avanafil (STENDRA) 200 MG TABS Take 1 tablet by mouth daily  Patient not taking: Reported on 3/1/2023 10/5/17   Vern Blake MD   omeprazole (PRILOSEC) 20 MG delayed release capsule TAKE 1 CAPSULE BY MOUTH DAILY  Patient taking differently: Take 20 mg by mouth Daily 10/5/17   Vern Blake MD   atorvastatin (LIPITOR) 40 MG tablet TAKE 1 TABLET BY MOUTH EVERY DAY 10/5/17   Vern Blake MD   lisinopril (PRINIVIL;ZESTRIL) 20 MG tablet TAKE 1 TABLET BY MOUTH DAILY  Patient not taking: Reported on 3/1/2023 10/5/17   Vern Blake MD   amLODIPine (NORVASC) 10 MG tablet TAKE 1 TABLET BY MOUTH DAILY 10/5/17   Vern Blake MD   sildenafil (VIAGRA) 100 MG tablet Take 1 tablet by mouth as needed for Erectile Dysfunction  Patient not taking: Reported on 3/1/2023 6/9/17   Vern Blake MD   tadalafil (CIALIS) 5 MG tablet One po daily for erectile dysfunction  Patient not taking: Reported on 3/1/2023 1/22/16   Vern Blake MD       Physical Examination:  Vitals:    03/06/23 0800   BP: 129/77   Pulse: 93   Resp: 16   Temp: 96.8 °F (36 °C)   SpO2: 98%      In: 605 [I.V.:605]  Out: -    Wt Readings from Last 3 Encounters:   23 164 lb 14.5 oz (74.8 kg)   22 165 lb (74.8 kg)   20 178 lb (80.7 kg)     Temp  Av.8 °F (36.6 °C)  Min: 96.8 °F (36 °C)  Max: 98.7 °F (37.1 °C)  Pulse  Av.1  Min: 90  Max: 116  BP  Min: 110/96  Max: 136/77  SpO2  Av.8 %  Min: 96 %  Max: 100 %    Intake/Output Summary (Last 24 hours) at 3/6/2023 1508  Last data filed at 3/6/2023 1102  Gross per 24 hour   Intake 600 ml   Output --   Net 600 ml     Constitutional: Oriented. No distress. Cardiovascular: Normal  rate, Irregular rhythm, S1&S2. Systolic murmur   Pulmonary/Chest: Bilateral respiratory sounds. No rhonchi. Abdominal: Soft. No tenderness   Musculoskeletal: No edema    Neurological: Alert and oriented. Follows command    Active Hospital Problems    Diagnosis Date Noted    Acute systolic heart failure ([facility] [address] [I50.21] 2023     Priority: Medium    Moderate to severe mitral regurgitation [I34.0] 2023     Priority: Medium    History of CVA (cerebrovascular accident) [Z86.73] 2023     Priority: Medium    Community acquired pneumonia [J18.9] 2023     Priority: Medium    Atrial fibrillation with rapid ventricular response ([address] [I48.91] 2023     Priority: Medium       Past Medical History:   Diagnosis Date    Headache     Hypertension     Intracerebral hemorrhage ([address] 2013    Recurrent 13 - Right occipitoparietal    Neck pain     Unspecified cerebral artery occlusion with cerebral infarction         Past Surgical History:   Procedure Laterality Date    SHOULDER SURGERY         No Known Allergies     reports that he has never smoked. He has never used smokeless tobacco. He reports that he does not currently use alcohol. He reports that he does not use drugs. All questions and concerns were addressed to the patient/family. Alternatives to my treatment were discussed.  I have discussed the above stated plan and the patient verbalized understanding and agreed with the plan. Total time spent > 35  minutes, including reviewing medical records, diagnostic tests, counseling and educating patient and family, orders, independently interpreting the results and documenting clinical information. NOTE: This report was transcribed using voice recognition software. Every effort was made to ensure accuracy, however, inadvertent computerized transcription errors may be present.      Jani Epperson MD, MPH  [facility]   Office: [phone]  Fax: [phone]

## 2023-03-06 NOTE — PROGRESS NOTES
[facility]    3/6/2023 7:37 AM        Name: Dorcas Torres             Admitted: 3/1/2023  Primary Care Provider: Esperanza Acosta MD (Tel: [phone])      Subjective:   Dorcas Torres is a 79 y.o. male with a past medical history of hypertension, ICA 8/2013 right occipitoparietal who presented with chest pain and SOB x 1 week. Reported having trouble walking. Admits to blood-tinged sputum a few days ago. Noted to be in AF with RVR on presentation and started on diltiazem gtt. Echo with severe LV dysfunction (EF 25%) and moderate -severe MR. S/p LHC 3/3 s/p WILFREDO to critical RCA and planned staged PCI of OM 3/6. Covid negative and flu negative    Interval History: Today, he is being seen for follow up. Resting comfortably in bed. Plans for LHC today. EP to see to determine timing of ARAMIS/DCCV. He feels well. No significant SOB or CP today. He has been ambulating independently. Denies GI/ complaints. Independently reviewed interval ancillary notes from cardiology. Problem List  Principal Problem:    Atrial fibrillation with rapid ventricular response (HCC)  Active Problems:    Acute systolic heart failure (HCC)    Moderate to severe mitral regurgitation    History of CVA (cerebrovascular accident)    Community acquired pneumonia  Resolved Problems:    * No resolved hospital problems.  *    Assessment and Plan:    Acute Systolic HF   - Appears compensated today  - CXR with cardiomegaly and mild pulmonary edema   - Continue PO lasix 40 mg bid, lisinopril added, and laurent added   - EF 25 %  Atrial Fibrillation RVR   - Remains in AF rates improved on dilt gtt    - Continue Toprol 100 mg daily and digoxin 125 mcg daily or per cards recs   - Hx of ICH, therapeutic AC is on hold for staged PCI Monday, consider resuming 3/6 per cards,    - Echo with severe LV dysfunction with EF 25% and MR   - Appreciate cardiology recs, prefer no diltiazem    CAD   - S/p [facility] with stent to mid RCA; to [facility] today for staged PCI   - No reports of angina, SOB improved   - Continue DAPT, BB and statin  Hypertension   - BP elevated, Toprol increased, needs GDMT will gradually titrate  Hx of ICA   - Hx of ICH 5/2013 and again 8/2013 in same region   - CT head with no acute abnormality or hemorrhage   Moderate to Severe MR   - Per echo   - Workup per cards    - PO digoxin started per cards rec  - NPO at midnight for [facility] and possible ARAMIS/DCCV    Discussed with Manuelito Arnold today    Discussed care with patient and nursing  All pertinent updates reviewed with attending physician.      Diet: Diet NPO  Code:Full Code  DVT PPX: Lovenox PPx     Disposition: Home when medically able, denies physical limitation or concerns    Current Medications  digoxin (LANOXIN) tablet 125 mcg, Daily  metoprolol succinate (TOPROL XL) extended release tablet 100 mg, Daily  potassium chloride (KLOR-CON M) extended release tablet 40 mEq, PRN   Or  potassium bicarb-citric acid (EFFER-K) effervescent tablet 40 mEq, PRN   Or  potassium chloride 10 mEq/100 mL IVPB (Peripheral Line), PRN  diphenhydrAMINE (BENADRYL) tablet 25 mg, Q6H PRN  aspirin EC tablet 81 mg, Daily  atorvastatin (LIPITOR) tablet 40 mg, Nightly  spironolactone (ALDACTONE) tablet 25 mg, Daily  clopidogrel (PLAVIX) tablet 75 mg, Daily  sodium chloride flush 0.9 % injection 5-40 mL, 2 times per day  sodium chloride flush 0.9 % injection 5-40 mL, PRN  0.9 % sodium chloride infusion, PRN  acetaminophen (TYLENOL) tablet 650 mg, Q4H PRN  lisinopril (PRINIVIL;ZESTRIL) tablet 2.5 mg, Daily  furosemide (LASIX) tablet 40 mg, BID  clonazePAM (KLONOPIN) tablet 0.5 mg, Q12H PRN  oxyCODONE (OXY-IR) immediate release tablet 15 mg, TID PRN  perflutren lipid microspheres (DEFINITY) injection 1.5 mL, ONCE PRN  sodium chloride flush 0.9 % injection 5-40 mL, 2 times per day  sodium chloride flush 0.9 % injection 5-40 mL, PRN  0.9 % sodium chloride infusion, PRN  ondansetron (ZOFRAN-ODT) disintegrating tablet 4 mg, Q8H PRN   Or  ondansetron (ZOFRAN) injection 4 mg, Q6H PRN  polyethylene glycol (GLYCOLAX) packet 17 g, Daily PRN  acetaminophen (TYLENOL) suppository 650 mg, Q6H PRN      Objective:  /84   Pulse (!) 116   Temp 98.7 °F (37.1 °C) (Temporal)   Resp 16   Ht 6' (1.829 m)   Wt 164 lb 14.5 oz (74.8 kg)   SpO2 96%   BMI 22.37 kg/m²   Vitals:    03/06/23 0411   BP: 131/84   Pulse: (!) 116   Resp: 16   Temp: 98.7 °F (37.1 °C)   SpO2: 96%       Intake/Output Summary (Last 24 hours) at 3/6/2023 0737  Last data filed at 3/5/2023 9808  Gross per 24 hour   Intake 5 ml   Output --   Net 5 ml        Wt Readings from Last 3 Encounters:   03/06/23 164 lb 14.5 oz (74.8 kg)   05/24/22 165 lb (74.8 kg)   05/06/20 178 lb (80.7 kg)     Review of Systems:  Constitutional: Negative for fever, weight changes, or weakness  Skin: Negative for bruising, bleeding, blood clots, or changes in skin pigment  HEENT: Negative for vision changes or dysphagia  Respiratory: Reviewed in HPI  Cardiovascular: Reviewed in HPI  Gastrointestinal: Negative for abdominal pain, N/V/D, constipation, or black/tarry stools  Genito-Urinary: Negative for hematuria  Musculoskeletal: No focal weakness  Neurological/Psych: Negative for confusion or TIA-like symptoms. No anxiety, depression, or insomnia    Physical Examination:  Telemetry: Personally Reviewed Atrial fibrillation w/ RVR  Constitutional: Cooperative and in no apparent distress, appears well nourished, No obesity  Skin: Warm and pink; no cyanosis, bruising, or clubbing, No lesions/incisions + right radial puncture site CDI, no hematoma or oozing  HEENT: Symmetric and normocephalic. Conjunctiva pink with clear sclera. Mucus membranes pink and moist.   Cardiovascular: irregular rate and rhythm. S1 & S2, positive for murmurs. Peripheral pulses 2+, No peripheral edema + tachycardia  Respiratory: Respirations symmetric and unlabored.  Lungs clear to auscultation bilaterally, no wheezing, crackles, or rhonchi   Gastrointestinal: Abdomen soft and round. normal bowel sounds. No tenderness  Musculoskeletal: No focal weakness, muscle strength 5/5 bilaterally  Neurologic/Psych: Awake and orientated to person, place and time. Calm affect, appropriate mood. Pertinent labs, diagnostic, and imaging results reviewed as a part of this visit    Labs and Tests:  CBC:   Recent Labs     03/04/23  0330 03/05/23  0540   WBC 9.3 10.5   HGB 12.6* 13.8    274       BMP:    Recent Labs     03/04/23  0330 03/05/23  0540    140   K 3.5 4.6    105   CO2 25 27   BUN 20 21*   CREATININE 1.2 1.0   GLUCOSE 166* 101*       Hepatic:   No results for input(s): AST, ALT, ALB, BILITOT, ALKPHOS in the last 72 hours. Relevant results:  CXR: 3/3/2023  Cardiomegaly with mild pulmonary venous congestion. CTPE: 3/1/2023  No evidence of pulmonary embolism. Mild infiltrate noted in the the left lower lobe, which may represent   pneumonia. ECG: 3/1/2023  AF with RVR    Echo: 3/2/2023   Summary   -Atrial fibrillation throughout the study.   -Left ventricular systolic function is reduced with ejection fraction   estimated at 25 %. -Global hypokinesis. .   -Sigmoid septum is present with normal thickness of remaining left ventricular wall segments.   -Left ventricular size is mildly increased. -Bi-atrial enlargement.   -Aortic valve appears sclerotic but opens adequately. -Mitral valve leaflets appear mildly thickened. -Mild mitral valve prolapse of the anterior leaflet.   -Severe mitral regurgitation with posteriorly directed jet. -Mild tricuspid regurgitation. -IVC size is dilated (>2.1 cm) but collapses > 50% with respiration   consistent with elevated RA pressure (8 mmHg). -The ascending aorta is mildly dilated.  3.8 cm    Left Heart Cath 3/3/2023  Dominance: Co      LM: 20% proximal   LAD: 40% mid stenosis; 30% proximal first diagonal   LCx: large vessel with moderate sized obtuse marginals (85% proximal stenosis of OM1, 40% proximal stenosis of OM2), 60% distal lesion  RCA: 40% proximal, 99% mid, 30% distal stenoses; SILVIA II flow beyond critical lesion      LVEDP: 7 mmHg  LVEF: 20-25%     6 Fr JR4 Guide  Boris Blue wire  6 Fr Guideliner     3.0 x 15 mm Trek predil     3.5 x 23 mm Xience WILFREDO deployed to 16 HAWA     0% residual, SILVIA III flow of distal RCA      Given significant restlessness throughout and eventual need for arm and head restraints, decision was made to end procedure with good PCI result before potential for patient harming oneself or requiring general anesthesia. Impression/Recommendations  S/P Successful PCI to critical mid RCA with one drug eluting stent. Plavix loaded in lab and recommended for at least 3 months. Therapeutic Lovenox can be continued prior to day of staged PCI-OM, tentatively 3/6/2023 (consider anesthesia). EP decision regarding ARAMIS/DCCV and AC following. Avoid triple therapy given history of ICH. Moderate intensity statin.    Guideline directed beta blocker, ACE inhibitor and aldosterone antagonist.     Alvin Lorenzo, GOLDEN - CNP   3/6/2023 7:37 AM

## 2023-03-06 NOTE — ANESTHESIA POSTPROCEDURE EVALUATION
Department of Anesthesiology  Postprocedure Note    Patient: David Joy  MRN: [id number]  YOB: 1955  Date of evaluation: 3/6/2023      Procedure Summary     Date: 03/06/23 Room / Location: [facility] Cath Lab    Anesthesia Start: 0656 Anesthesia Stop: 1114    Procedures:       COR LV      Procedure Not Yet Scheduled Diagnosis:     Scheduled Providers:  Responsible Provider: Jassi Garcia MD    Anesthesia Type: General ASA Status: 4          Anesthesia Type: General    Hayden Phase I:      Hayden Phase II:        Anesthesia Post Evaluation    Patient location during evaluation: bedside  Airway patency: patent  Nausea & Vomiting: no vomiting  Complications: no  Cardiovascular status: hemodynamically stable  Respiratory status: acceptable  Hydration status: euvolemic  Multimodal analgesia pain management approach

## 2023-03-07 ENCOUNTER — ANESTHESIA EVENT (OUTPATIENT)
Dept: CARDIAC CATH/INVASIVE PROCEDURES | Age: 68
DRG: 246 | End: 2023-03-07
Payer: MEDICARE

## 2023-03-07 ENCOUNTER — APPOINTMENT (OUTPATIENT)
Dept: CARDIAC CATH/INVASIVE PROCEDURES | Age: 68
DRG: 246 | End: 2023-03-07
Payer: MEDICARE

## 2023-03-07 ENCOUNTER — ANESTHESIA (OUTPATIENT)
Dept: CARDIAC CATH/INVASIVE PROCEDURES | Age: 68
DRG: 246 | End: 2023-03-07
Payer: MEDICARE

## 2023-03-07 VITALS
OXYGEN SATURATION: 92 % | HEART RATE: 97 BPM | TEMPERATURE: 97.2 F | DIASTOLIC BLOOD PRESSURE: 89 MMHG | SYSTOLIC BLOOD PRESSURE: 104 MMHG | WEIGHT: 157.85 LBS | RESPIRATION RATE: 16 BRPM | BODY MASS INDEX: 21.38 KG/M2 | HEIGHT: 72 IN

## 2023-03-07 LAB
ANION GAP SERPL CALCULATED.3IONS-SCNC: 9 MMOL/L (ref 3–16)
BUN BLDV-MCNC: 28 MG/DL (ref 7–20)
CALCIUM SERPL-MCNC: 9.2 MG/DL (ref 8.3–10.6)
CHLORIDE BLD-SCNC: 102 MMOL/L (ref 99–110)
CO2: 27 MMOL/L (ref 21–32)
CREAT SERPL-MCNC: 1.3 MG/DL (ref 0.8–1.3)
GFR SERPL CREATININE-BSD FRML MDRD: >60 ML/MIN/{1.73_M2}
GLUCOSE BLD-MCNC: 100 MG/DL (ref 70–99)
HCT VFR BLD CALC: 41.2 % (ref 40.5–52.5)
HEMOGLOBIN: 14 G/DL (ref 13.5–17.5)
LV EF: 25 %
LVEF MODALITY: NORMAL
MCH RBC QN AUTO: 30.5 PG (ref 26–34)
MCHC RBC AUTO-ENTMCNC: 34 G/DL (ref 31–36)
MCV RBC AUTO: 89.6 FL (ref 80–100)
PDW BLD-RTO: 13.6 % (ref 12.4–15.4)
PLATELET # BLD: 288 K/UL (ref 135–450)
PMV BLD AUTO: 9.6 FL (ref 5–10.5)
POC ACT LR: >400 SEC
POTASSIUM SERPL-SCNC: 4.8 MMOL/L (ref 3.5–5.1)
RBC # BLD: 4.6 M/UL (ref 4.2–5.9)
SODIUM BLD-SCNC: 138 MMOL/L (ref 136–145)
WBC # BLD: 11.6 K/UL (ref 4–11)

## 2023-03-07 PROCEDURE — 99232 SBSQ HOSP IP/OBS MODERATE 35: CPT | Performed by: NURSE PRACTITIONER

## 2023-03-07 PROCEDURE — 6370000000 HC RX 637 (ALT 250 FOR IP): Performed by: INTERNAL MEDICINE

## 2023-03-07 PROCEDURE — 6370000000 HC RX 637 (ALT 250 FOR IP): Performed by: CLINICAL NURSE SPECIALIST

## 2023-03-07 PROCEDURE — 5A2204Z RESTORATION OF CARDIAC RHYTHM, SINGLE: ICD-10-PCS | Performed by: INTERNAL MEDICINE

## 2023-03-07 PROCEDURE — 3700000001 HC ADD 15 MINUTES (ANESTHESIA)

## 2023-03-07 PROCEDURE — 3700000000 HC ANESTHESIA ATTENDED CARE

## 2023-03-07 PROCEDURE — 85027 COMPLETE CBC AUTOMATED: CPT

## 2023-03-07 PROCEDURE — 2500000003 HC RX 250 WO HCPCS: Performed by: NURSE ANESTHETIST, CERTIFIED REGISTERED

## 2023-03-07 PROCEDURE — 6360000002 HC RX W HCPCS: Performed by: NURSE ANESTHETIST, CERTIFIED REGISTERED

## 2023-03-07 PROCEDURE — 80048 BASIC METABOLIC PNL TOTAL CA: CPT

## 2023-03-07 PROCEDURE — 2580000003 HC RX 258: Performed by: NURSE ANESTHETIST, CERTIFIED REGISTERED

## 2023-03-07 PROCEDURE — 6370000000 HC RX 637 (ALT 250 FOR IP): Performed by: NURSE PRACTITIONER

## 2023-03-07 PROCEDURE — B24BZZ4 ULTRASONOGRAPHY OF HEART WITH AORTA, TRANSESOPHAGEAL: ICD-10-PCS | Performed by: INTERNAL MEDICINE

## 2023-03-07 RX ORDER — METOPROLOL SUCCINATE 50 MG/1
50 TABLET, EXTENDED RELEASE ORAL 2 TIMES DAILY
Status: DISCONTINUED | OUTPATIENT
Start: 2023-03-07 | End: 2023-03-07 | Stop reason: HOSPADM

## 2023-03-07 RX ORDER — FUROSEMIDE 40 MG/1
40 TABLET ORAL DAILY
Qty: 30 TABLET | Refills: 5 | Status: SHIPPED | OUTPATIENT
Start: 2023-03-08

## 2023-03-07 RX ORDER — KETAMINE HCL IN NACL, ISO-OSM 100MG/10ML
SYRINGE (ML) INJECTION PRN
Status: DISCONTINUED | OUTPATIENT
Start: 2023-03-07 | End: 2023-03-07 | Stop reason: SDUPTHER

## 2023-03-07 RX ORDER — SODIUM CHLORIDE 9 MG/ML
INJECTION, SOLUTION INTRAVENOUS CONTINUOUS PRN
Status: DISCONTINUED | OUTPATIENT
Start: 2023-03-07 | End: 2023-03-07 | Stop reason: SDUPTHER

## 2023-03-07 RX ORDER — AMIODARONE HYDROCHLORIDE 200 MG/1
200 TABLET ORAL 2 TIMES DAILY
Status: DISCONTINUED | OUTPATIENT
Start: 2023-03-07 | End: 2023-03-07 | Stop reason: HOSPADM

## 2023-03-07 RX ORDER — AMIODARONE HYDROCHLORIDE 200 MG/1
TABLET ORAL
Qty: 97 TABLET | Refills: 0 | Status: SHIPPED | OUTPATIENT
Start: 2023-03-07 | End: 2023-06-05

## 2023-03-07 RX ORDER — METOPROLOL SUCCINATE 50 MG/1
50 TABLET, EXTENDED RELEASE ORAL 2 TIMES DAILY
Qty: 60 TABLET | Refills: 5 | Status: SHIPPED | OUTPATIENT
Start: 2023-03-07

## 2023-03-07 RX ORDER — SPIRONOLACTONE 25 MG/1
25 TABLET ORAL DAILY
Qty: 30 TABLET | Refills: 5 | Status: SHIPPED | OUTPATIENT
Start: 2023-03-08

## 2023-03-07 RX ORDER — PROPOFOL 10 MG/ML
INJECTION, EMULSION INTRAVENOUS PRN
Status: DISCONTINUED | OUTPATIENT
Start: 2023-03-07 | End: 2023-03-07 | Stop reason: SDUPTHER

## 2023-03-07 RX ORDER — DEXMEDETOMIDINE HYDROCHLORIDE 100 UG/ML
INJECTION, SOLUTION INTRAVENOUS PRN
Status: DISCONTINUED | OUTPATIENT
Start: 2023-03-07 | End: 2023-03-07 | Stop reason: SDUPTHER

## 2023-03-07 RX ORDER — CLOPIDOGREL BISULFATE 75 MG/1
75 TABLET ORAL DAILY
Qty: 30 TABLET | Refills: 3 | Status: SHIPPED | OUTPATIENT
Start: 2023-03-08

## 2023-03-07 RX ORDER — LISINOPRIL 2.5 MG/1
2.5 TABLET ORAL DAILY
Qty: 30 TABLET | Refills: 3 | Status: SHIPPED | OUTPATIENT
Start: 2023-03-08

## 2023-03-07 RX ORDER — ATORVASTATIN CALCIUM 80 MG/1
80 TABLET, FILM COATED ORAL NIGHTLY
Qty: 30 TABLET | Refills: 3 | Status: SHIPPED | OUTPATIENT
Start: 2023-03-07

## 2023-03-07 RX ORDER — FUROSEMIDE 40 MG/1
40 TABLET ORAL DAILY
Status: DISCONTINUED | OUTPATIENT
Start: 2023-03-08 | End: 2023-03-07 | Stop reason: HOSPADM

## 2023-03-07 RX ORDER — FENTANYL CITRATE 50 UG/ML
INJECTION, SOLUTION INTRAMUSCULAR; INTRAVENOUS PRN
Status: DISCONTINUED | OUTPATIENT
Start: 2023-03-07 | End: 2023-03-07 | Stop reason: SDUPTHER

## 2023-03-07 RX ORDER — LIDOCAINE HYDROCHLORIDE 20 MG/ML
INJECTION, SOLUTION EPIDURAL; INFILTRATION; INTRACAUDAL; PERINEURAL PRN
Status: DISCONTINUED | OUTPATIENT
Start: 2023-03-07 | End: 2023-03-07 | Stop reason: SDUPTHER

## 2023-03-07 RX ADMIN — PROPOFOL 20 MG: 10 INJECTION, EMULSION INTRAVENOUS at 13:20

## 2023-03-07 RX ADMIN — FENTANYL CITRATE 25 MCG: 50 INJECTION, SOLUTION INTRAMUSCULAR; INTRAVENOUS at 13:14

## 2023-03-07 RX ADMIN — PROPOFOL 20 MG: 10 INJECTION, EMULSION INTRAVENOUS at 13:06

## 2023-03-07 RX ADMIN — FUROSEMIDE 40 MG: 40 TABLET ORAL at 08:50

## 2023-03-07 RX ADMIN — DEXMEDETOMIDINE HYDROCHLORIDE 10 MCG: 100 INJECTION, SOLUTION INTRAVENOUS at 13:06

## 2023-03-07 RX ADMIN — FENTANYL CITRATE 25 MCG: 50 INJECTION, SOLUTION INTRAMUSCULAR; INTRAVENOUS at 13:06

## 2023-03-07 RX ADMIN — APIXABAN 5 MG: 5 TABLET, FILM COATED ORAL at 08:49

## 2023-03-07 RX ADMIN — Medication 10 MG: at 13:19

## 2023-03-07 RX ADMIN — FENTANYL CITRATE 25 MCG: 50 INJECTION, SOLUTION INTRAMUSCULAR; INTRAVENOUS at 13:10

## 2023-03-07 RX ADMIN — DEXMEDETOMIDINE HYDROCHLORIDE 10 MCG: 100 INJECTION, SOLUTION INTRAVENOUS at 13:12

## 2023-03-07 RX ADMIN — PROPOFOL 20 MG: 10 INJECTION, EMULSION INTRAVENOUS at 13:16

## 2023-03-07 RX ADMIN — LISINOPRIL 2.5 MG: 5 TABLET ORAL at 08:49

## 2023-03-07 RX ADMIN — FENTANYL CITRATE 25 MCG: 50 INJECTION, SOLUTION INTRAMUSCULAR; INTRAVENOUS at 13:18

## 2023-03-07 RX ADMIN — DIGOXIN 125 MCG: 125 TABLET ORAL at 08:49

## 2023-03-07 RX ADMIN — SPIRONOLACTONE 25 MG: 25 TABLET ORAL at 08:49

## 2023-03-07 RX ADMIN — METOPROLOL SUCCINATE 100 MG: 50 TABLET, EXTENDED RELEASE ORAL at 08:49

## 2023-03-07 RX ADMIN — Medication 10 MG: at 13:09

## 2023-03-07 RX ADMIN — PROPOFOL 20 MG: 10 INJECTION, EMULSION INTRAVENOUS at 13:11

## 2023-03-07 RX ADMIN — SODIUM CHLORIDE: 9 INJECTION, SOLUTION INTRAVENOUS at 13:02

## 2023-03-07 RX ADMIN — Medication 20 MG: at 13:06

## 2023-03-07 RX ADMIN — LIDOCAINE HYDROCHLORIDE 50 MG: 20 INJECTION, SOLUTION EPIDURAL; INFILTRATION; INTRACAUDAL; PERINEURAL at 13:06

## 2023-03-07 RX ADMIN — Medication 10 MG: at 13:13

## 2023-03-07 RX ADMIN — CLOPIDOGREL BISULFATE 75 MG: 75 TABLET ORAL at 08:49

## 2023-03-07 ASSESSMENT — PAIN SCALES - GENERAL
PAINLEVEL_OUTOF10: 0

## 2023-03-07 ASSESSMENT — ENCOUNTER SYMPTOMS: SHORTNESS OF BREATH: 0

## 2023-03-07 NOTE — PROCEDURES
[facility]     Electrophysiology Procedure Note       Date of Procedure: 3/7/2023  Patient's Name: Laci Vargas  YOB: 1955   Medical Record Number: [id number]  Procedure Performed by: Jelani Arthur MD    Procedures performed:  External Electrical cardioversion   ARAMIS   IV sedation by Anesthesia    Indication of the procedure: Persistent atrial fibrillation     Details of procedure: The patient was brought to the cath lab area in a fasting and non-sedated state. The risks, benefits and alternatives of the procedure were discussed with the patient. The patient opted to proceed with the procedure. Written informed consent was signed and placed in the chart. A timeout protocol was completed to identify the patient and the procedure being performed. ARAMIS was performed which did not show any CHENCHO/LA clot/thrombus. Electrical DC cardioversion was perfomred using 200J, 360J, synchronized shock. Each time patient was converted to sinus rhythm but Afib recurred. The patient tolerated the procedure well and there were no complications. Conclusion:     Failed external DC cardioversion of atrial fibrillation     Can consider starting antiarrhythmic therapy with Amiodarone and repeat cardioversion after 4 weeks. Ablation with be considered in future.

## 2023-03-07 NOTE — PROGRESS NOTES
[facility]   Electrophysiology Progress Note     Date: 3/7/2023  Admit Date: 3/1/2023     Reason for consultation: AF    Chief Complaint:   Chief Complaint   Patient presents with    Shortness of Breath     Complaints of shortness of breath x1 week with ambulation, states coughing up blood tinged sputum for a few days, presents with a HR of 150-170, only hx of HTN     History of Present Illness: History obtained from patient and medical record. Elver Mckeon is a 79 y.o. male with a past medical history of AF, MR, CHF, and CVA. Hx of ICH (2013). Pt presented to hospital with SOB and orthopnea. Interval Hx: Today, he is being seen for EP follow up. He remains in atrial fibrillation. His BP is labile. We discussed doing ARAMIS/DCCV today. He would like to go home. Patient seen and examined. Clinical notes reviewed. Telemetry reviewed. No new complaints today. No major events overnight. Denies having chest pain, palpitations, shortness of breath, orthopnea/PND, cough, or dizziness at the time of this visit. Allergies:  No Known Allergies    Home Meds:  Prior to Visit Medications    Medication Sig Taking? Authorizing Provider   propranolol (INDERAL) 20 MG tablet Take 20 mg by mouth daily Yes Historical Provider, MD   Glucosamine HCl (GLUCOSAMINE PO) Take 1,000 mg by mouth daily Yes Historical Provider, MD   fluticasone (FLONASE) 50 MCG/ACT nasal spray 2 sprays by Each Nostril route daily Yes Historical Provider, MD   oxyCODONE (OXY-IR) 15 MG immediate release tablet Take 15 mg by mouth 3 times daily as needed for Pain.  Yes Historical Provider, MD   diclofenac sodium (VOLTAREN) 1 % GEL Apply 4 g topically 4 times daily  Patient not taking: Reported on 3/1/2023  ANANTH Nelson   clonazePAM (KLONOPIN) 0.5 MG tablet Take 1 tablet by mouth 2 times daily  Patient not taking: Reported on 3/1/2023  Gilberto Stacy MD   nystatin (MYCOSTATIN) 632825 UNIT/GM cream Apply topically 2 times daily.   Patient not taking: Reported on 3/1/2023  Morena Mcclelland MD   Avanafil (STENDRA) 200 MG TABS Take 1 tablet by mouth daily  Patient not taking: Reported on 3/1/2023  Morena Mcclelland MD   omeprazole (PRILOSEC) 20 MG delayed release capsule TAKE 1 CAPSULE BY MOUTH DAILY  Patient taking differently: Take 20 mg by mouth Daily  Morena Mcclelland MD   atorvastatin (LIPITOR) 40 MG tablet TAKE 1 TABLET BY MOUTH EVERY DAY  Morena Mcclelland MD   lisinopril (PRINIVIL;ZESTRIL) 20 MG tablet TAKE 1 TABLET BY MOUTH DAILY  Patient not taking: Reported on 3/1/2023  Morena Mcclelland MD   amLODIPine (NORVASC) 10 MG tablet TAKE 1 TABLET BY MOUTH DAILY  Morena Mcclelland MD   sildenafil (VIAGRA) 100 MG tablet Take 1 tablet by mouth as needed for Erectile Dysfunction  Patient not taking: Reported on 3/1/2023  Morena Mcclelland MD   tadalafil (CIALIS) 5 MG tablet One po daily for erectile dysfunction  Patient not taking: Reported on 3/1/2023  Morena Mcclelland MD      Scheduled Meds:   atorvastatin  80 mg Oral Nightly    sodium chloride flush  5-40 mL IntraVENous 2 times per day    apixaban  5 mg Oral BID    digoxin  125 mcg Oral Daily    metoprolol succinate  100 mg Oral Daily    spironolactone  25 mg Oral Daily    clopidogrel  75 mg Oral Daily    sodium chloride flush  5-40 mL IntraVENous 2 times per day    lisinopril  2.5 mg Oral Daily    furosemide  40 mg Oral BID    sodium chloride flush  5-40 mL IntraVENous 2 times per day     Continuous Infusions:   sodium chloride      sodium chloride      sodium chloride       PRN Meds:sodium chloride flush, sodium chloride, acetaminophen, oxyCODONE-acetaminophen **OR** oxyCODONE-acetaminophen, ondansetron, potassium chloride **OR** potassium alternative oral replacement **OR** potassium chloride, diphenhydrAMINE, sodium chloride flush, sodium chloride, clonazePAM, perflutren lipid microspheres, sodium chloride flush, sodium chloride, ondansetron **OR** [DISCONTINUED] ondansetron, polyethylene glycol, [DISCONTINUED] acetaminophen **OR** acetaminophen     Past Medical History:  Past Medical History:   Diagnosis Date    Headache     Hypertension     Intracerebral hemorrhage ([facility] [address] 5/12/2013    Recurrent 8/6/13 - Right occipitoparietal    Neck pain     Unspecified cerebral artery occlusion with cerebral infarction       Past Surgical History:    has a past surgical history that includes shoulder surgery. Social History:  Reviewed. reports that he has never smoked. He has never used smokeless tobacco. He reports that he does not currently use alcohol. He reports that he does not use drugs. Family History:  Reviewed. family history includes Cancer in his mother; Clotting Disorder in his father; Diabetes in his father and sister. Review of Systems:  Constitutional: Negative for fever, night sweats, chills, weight changes, or weakness  Skin: Negative for rash, dry skin, pruritus, bruising, bleeding, blood clots, or changes in skin pigment  HEENT: Negative for vision changes, ringing in the ears, sore throat, dysphagia, or swollen lymph nodes  Respiratory: Reviewed in HPI  Cardiovascular: Reviewed in HPI  Gastrointestinal: Negative for abdominal pain, N/V/D, constipation, or black/tarry stools  Genito-Urinary: Negative for dysuria, incontinence, urgency, or hematuria  Musculoskeletal: Negative for joint swelling, muscle pain, or injuries  Neurological/Psych: Negative for confusion, seizures, headaches, balance issues or TIA-like symptoms.  No anxiety, depression, or insomnia    Physical Examination:  Vitals:    03/07/23 0901   BP: 83/67   Pulse: 97   Resp:    Temp:    SpO2:       In: 600 [I.V.:600]  Out: -    Wt Readings from Last 3 Encounters:   03/07/23 157 lb 13.6 oz (71.6 kg)   05/24/22 165 lb (74.8 kg)   05/06/20 178 lb (80.7 kg)       Intake/Output Summary (Last 24 hours) at 3/7/2023 0914  Last data filed at 3/6/2023 1102  Gross per 24 hour   Intake 600 ml   Output --   Net 600 ml Telemetry: Personally Reviewed  - Atrial fibrillation. Rate 80s  Constitutional: Cooperative and in no apparent distress, and appears well nourished  Skin: Warm and pink; no pallor, cyanosis, bruising, or clubbing  HEENT: Symmetric and normocephalic. PERRL, EOM intact. Conjunctiva pink with clear sclera. Mucus membranes pink and moist. Teeth intact. Thyroid smooth without nodules or goiter. Cardiovascular: Regular rate and irregular rhythm. S1/S2 present without murmurs, rubs, or gallops. Peripheral pulses 2+, capillary refill < 3 seconds. No elevation of JVP. No peripheral edema  Respiratory: Respirations symmetric and unlabored. Lungs clear to auscultation bilaterally, no wheezing, crackles, or rhonchi  Gastrointestinal: Abdomen soft and round. Bowel sounds normoactive in all quadrants without tenderness or masses. Musculoskeletal: Bilateral upper and lower extremity strength 5/5 with full ROM  Neurologic/Psych: Awake and orientated to person, place and time. Calm affect, appropriate mood    Pertinent labs, diagnostic, device, and imaging results reviewed as a part of this visit    Labs:    BMP:   Recent Labs     03/05/23  0540 03/06/23  0851 03/07/23  0509    140 138   K 4.6 4.6 4.8    102 102   CO2 27 28 27   BUN 21* 28* 28*   CREATININE 1.0 1.1 1.3   MG 2.10  --   --      Estimated Creatinine Clearance: 56 mL/min (based on SCr of 1.3 mg/dL).    CBC:   Recent Labs     03/05/23  0540 03/06/23  0851 03/07/23  0509   WBC 10.5 11.4* 11.6*   HGB 13.8 15.0 14.0   HCT 39.8* 44.5 41.2   MCV 89.0 89.2 89.6    294 288     Thyroid:   Lab Results   Component Value Date/Time    TSH 0.58 03/01/2023 12:33 PM     Lipids:   Lab Results   Component Value Date/Time    CHOL 196 05/24/2016 12:56 PM    HDL 54 05/24/2016 12:56 PM    TRIG 176 05/24/2016 12:56 PM     LFTS:   Lab Results   Component Value Date/Time    ALT 18 03/01/2023 12:33 PM    AST 22 03/01/2023 12:33 PM    ALKPHOS 120 03/01/2023 12:33 PM PROT 7.3 03/01/2023 12:33 PM    PROT 6.3 03/29/2012 09:42 AM    AGRATIO 1.0 03/01/2023 12:33 PM    BILITOT 1.0 03/01/2023 12:33 PM     Cardiac Enzymes:   Lab Results   Component Value Date/Time    TROPONINI <0.01 03/01/2023 12:33 PM    TROPONINI <0.006 08/06/2013 02:05 PM    TROPONINI <0.01 06/14/2013 08:26 PM    TROPONINI <0.006 05/20/2013 05:05 PM     Coags:   Lab Results   Component Value Date/Time    PROTIME 11.3 08/06/2013 02:05 PM    INR 1.01 08/06/2013 02:05 PM       ECG: 3/3/23 (Personally Interpreted)   - Atrial fibrillation    ECHO: 3/2/23   -Atrial fibrillation throughout the study.   -Left ventricular systolic function is reduced with ejection fraction estimated at 25%. Global hypokinesis. -Sigmoid septum is present with normal thickness of remaining left ventricular wall segments.   -Left ventricular size is mildly increased. Bi-atrial enlargement.   -Aortic valve appears sclerotic but opens adequately. Mitral valve leaflets appear mildly thickened. -Mild mitral valve prolapse of the anterior leaflet. Severe mitral regurgitation with posteriorly directed jet. -Mild tricuspid regurgitation. -IVC size is dilated (>2.1 cm) but collapses > 50% with respiration consistent with elevated RA pressure (8 mmHg). -The ascending aorta is mildly dilated. 3.8 cm    Cath: 3/6/23  Anatomy:   LM-nml   LAD-mid 70-80%  Cx-distal 60%  OM1 prox 85%  OM2 prox 90%  RCA-mid 50% stent under expansion  RPDA- nml     FFR LAD 0.77     Intervention  ~Successful PCI to RCA stent with 3.75x15 NC balloon angioplasty. PCI to OM1 with 2.25x18 WILFREDO. PCI to OM2 with 2.5x23 WILFREDO. PCI to LAD with 3.5x33 WILFREDO. Excellent Result.       Problem List:   Patient Active Problem List    Diagnosis Date Noted    HFrEF (heart failure with reduced ejection fraction) ([facility] [address] 15/73/6654    Acute systolic heart failure ([facility] [address] 03/03/2023    Moderate to severe mitral regurgitation 03/03/2023    History of CVA (cerebrovascular accident) 03/03/2023 Community acquired pneumonia 03/03/2023    Atrial fibrillation with rapid ventricular response ([facility] [address] 03/01/2023    CVA (cerebrovascular accident due to intracerebral hemorrhage) ([facility] [address] 10/06/2013    Intracranial bleed ([facility] [address] 05/20/2013    Headache, chronic daily 04/04/2013    HTN (hypertension) 04/04/2013        Assessment and Plan: 1. Persistent Atrial Fibrillation  - Currently in AF  - Continue Toprol  mg QD, digoxin 125 mcg QD  - [id number] score:high; [id number] Vasc score and anticoagulation discussed. High risk for stroke and thromboembolism. Anticoagulation is recommended. Risk of bleeding was discussed. ~ Continue Eliquis 5 mg BID    - Afib risk factors including age, HTN, obesity, inactivity and ALEJANDRA were discussed with patient. Risk factor modification recommended       - Treatment options including cardioversion, rate control strategy, antiarrhythmics, anticoagulation and possible ablation were discussed with patient. Risks, benefits and alternative of each treatment options were explained. All questions answered    2. CAD  - S/p PCI   ~ Right radial cath site stable, no swelling/hematoma    - Stable  - No complaints of angina  - Continue Plavix, ACEI, BB, and statin    3. LV Dysfunction, Acute systolic heart failure (NYHA Class II)  - Appears compensated   ~ EF 25% per echo  - Continue with BB, ACE, digoxin, spironolactone   ~ Consider adding SGLT2 inhibitor as outpatient  - Monitor I&O's, Daily weights    - Will need to repeat echo in a few months to reassess his EF after revascularization and OMT. If EF remains low (<35%), then AICD will need to be considered    4. Mitral Regurgitation   - Severe on transthoracic echo   - Will re-evaluate with ARAMIS today    5.  HTN  - BP labile this AM. Asymptomatic  ~Goal <130/80  - Continue current medications (Reduce lasix to 40 mg daily and will change Toprol XL to 50 mg BID to help with BP fluctuations)  - Encouraged to monitor and log BP readings at home, then bring log to next visit  - Discussed importance of low sodium diet, weight control and exercise    Hopefully ok for d/c later today following ARAMIS/DCCV. Will need close follow up with CHF and EP teams at d/c    All pertinent information and plan of care discussed with the EP physician. All questions and concerns were addressed to the patient. Alternatives to my treatment were discussed. I have discussed the above stated plan with patient and the nurse. The patient verbalized understanding and agreed with the plan.     Thank you for allowing to us to participate in the care of AYLA Correia  [facility], [place]   Office: [phone]

## 2023-03-07 NOTE — ANESTHESIA PRE PROCEDURE
Department of Anesthesiology  Preprocedure Note       Name:  Noah Berg   Age:  79 y.o.  :  1955                                          MRN:  [id number]         Date:  3/7/2023      Surgeon: * No surgeons listed *    Procedure: * No procedures listed *    Medications prior to admission:   Prior to Admission medications    Medication Sig Start Date End Date Taking? Authorizing Provider   propranolol (INDERAL) 20 MG tablet Take 20 mg by mouth daily   Yes Historical Provider, MD   Glucosamine HCl (GLUCOSAMINE PO) Take 1,000 mg by mouth daily   Yes Historical Provider, MD   fluticasone (FLONASE) 50 MCG/ACT nasal spray 2 sprays by Each Nostril route daily   Yes Historical Provider, MD   oxyCODONE (OXY-IR) 15 MG immediate release tablet Take 15 mg by mouth 3 times daily as needed for Pain. Yes Historical Provider, MD   diclofenac sodium (VOLTAREN) 1 % GEL Apply 4 g topically 4 times daily  Patient not taking: Reported on 3/1/2023 5/6/20 6/5/20  ANANTH Muse   clonazePAM (KLONOPIN) 0.5 MG tablet Take 1 tablet by mouth 2 times daily  Patient not taking: Reported on 3/1/2023 10/5/17   Susy Al MD   nystatin (MYCOSTATIN) 440176 UNIT/GM cream Apply topically 2 times daily.   Patient not taking: Reported on 3/1/2023 10/5/17   Susy Al MD   Avanafil (STENDRA) 200 MG TABS Take 1 tablet by mouth daily  Patient not taking: Reported on 3/1/2023 10/5/17   Susy Al MD   omeprazole (PRILOSEC) 20 MG delayed release capsule TAKE 1 CAPSULE BY MOUTH DAILY  Patient taking differently: Take 20 mg by mouth Daily 10/5/17   Susy Al MD   atorvastatin (LIPITOR) 40 MG tablet TAKE 1 TABLET BY MOUTH EVERY DAY 10/5/17   Susy Al MD   lisinopril (PRINIVIL;ZESTRIL) 20 MG tablet TAKE 1 TABLET BY MOUTH DAILY  Patient not taking: Reported on 3/1/2023 10/5/17   Susy Al MD   amLODIPine (NORVASC) 10 MG tablet TAKE 1 TABLET BY MOUTH DAILY 10/5/17   Susy Al MD   sildenafil (VIAGRA) 100 MG tablet Take 1 tablet by mouth as needed for Erectile Dysfunction  Patient not taking: Reported on 3/1/2023 6/9/17   Masood Devries MD   tadalafil (CIALIS) 5 MG tablet One po daily for erectile dysfunction  Patient not taking: Reported on 3/1/2023 1/22/16   Masood Devries MD       Current medications:    Current Facility-Administered Medications   Medication Dose Route Frequency Provider Last Rate Last Admin   • metoprolol succinate (TOPROL XL) extended release tablet 50 mg  50 mg Oral BID GOLDEN Ram CNP       • [START ON 3/8/2023] furosemide (LASIX) tablet 40 mg  40 mg Oral Daily GOLDEN Ram CNP       • atorvastatin (LIPITOR) tablet 80 mg  80 mg Oral Nightly Shyam Alfonso MD   80 mg at 03/06/23 2021   • sodium chloride flush 0.9 % injection 5-40 mL  5-40 mL IntraVENous 2 times per day Shyam Alfonso MD   10 mL at 03/06/23 2022   • sodium chloride flush 0.9 % injection 5-40 mL  5-40 mL IntraVENous PRN Shyam Alfonso MD       • 0.9 % sodium chloride infusion   IntraVENous PRN Syham Alfonso MD       • acetaminophen (TYLENOL) tablet 650 mg  650 mg Oral Q4H PRN Shyam Alfonso MD       • oxyCODONE-acetaminophen (PERCOCET) 5-325 MG per tablet 1 tablet  1 tablet Oral Q4H PRN Shyam Alfonso MD        Or   • oxyCODONE-acetaminophen (PERCOCET) 5-325 MG per tablet 2 tablet  2 tablet Oral Q4H PRN Shyam Alfonso MD       • ondansetron (ZOFRAN) injection 4 mg  4 mg IntraVENous Q6H PRN Shyam Alfonso MD       • apixaban (ELIQUIS) tablet 5 mg  5 mg Oral BID Shyam Alfonso MD   5 mg at 03/07/23 0849   • digoxin (LANOXIN) tablet 125 mcg  125 mcg Oral Daily GOLDEN Jaeger   125 mcg at 03/07/23 0849   • potassium chloride (KLOR-CON M) extended release tablet 40 mEq  40 mEq Oral PRN GOLDEN Zaman CNP   40 mEq at 03/04/23 1743    Or   • potassium bicarb-citric acid (EFFER-K) effervescent tablet 40 mEq  40 mEq Oral PRN GOLDEN Zaman - CNP        Or   •  potassium chloride 10 mEq/100 mL IVPB (Peripheral Line)  10 mEq IntraVENous PRN Nucla Perish, APRN - CNP        diphenhydrAMINE (BENADRYL) tablet 25 mg  25 mg Oral Q6H PRN Nucla Perish, APRN - CNP   25 mg at 03/05/23 1229    spironolactone (ALDACTONE) tablet 25 mg  25 mg Oral Daily Ciera Castellani, DO   25 mg at 03/07/23 0849    clopidogrel (PLAVIX) tablet 75 mg  75 mg Oral Daily Ciera Castellani, DO   75 mg at 03/07/23 0849    sodium chloride flush 0.9 % injection 5-40 mL  5-40 mL IntraVENous 2 times per day Ciera Castellani, DO   10 mL at 03/05/23 1934    sodium chloride flush 0.9 % injection 5-40 mL  5-40 mL IntraVENous PRN Ciera Castellani, DO        0.9 % sodium chloride infusion   IntraVENous PRN Ciera Castellani, DO        lisinopril (PRINIVIL;ZESTRIL) tablet 2.5 mg  2.5 mg Oral Daily Ciera Castellani, DO   2.5 mg at 03/07/23 0849    clonazePAM (KLONOPIN) tablet 0.5 mg  0.5 mg Oral Q12H PRN Nucla Perish, APRN - CNP        perflutren lipid microspheres (DEFINITY) injection 1.5 mL  1.5 mL IntraVENous ONCE PRN Svetlana Van MD        sodium chloride flush 0.9 % injection 5-40 mL  5-40 mL IntraVENous 2 times per day Hank Torres MD   10 mL at 03/05/23 1934    sodium chloride flush 0.9 % injection 5-40 mL  5-40 mL IntraVENous PRN Svetlana Van MD        0.9 % sodium chloride infusion   IntraVENous PRN Hank Torres MD        ondansetron (ZOFRAN-ODT) disintegrating tablet 4 mg  4 mg Oral Q8H PRN Svetlana Van MD        polyethylene glycol (GLYCOLAX) packet 17 g  17 g Oral Daily PRN Hank Torres MD        acetaminophen (TYLENOL) suppository 650 mg  650 mg Rectal Q6H PRN Hank Torres MD           Allergies:  No Known Allergies    Problem List:    Patient Active Problem List   Diagnosis Code    Headache, chronic daily R51.9    HTN (hypertension) I10    Intracranial bleed (HCC) I62.9    CVA (cerebrovascular accident due to intracerebral hemorrhage) ([facility] [address] I61.9    Atrial fibrillation with rapid ventricular response (HCC) U88.94    Acute systolic heart failure ([facility]) I50.21    Moderate to severe mitral regurgitation I34.0    History of CVA (cerebrovascular accident) Z80.78    Community acquired pneumonia J18.9    HFrEF (heart failure with reduced ejection fraction) ([facility]) I50.20       Past Medical History:        Diagnosis Date    Headache     Hypertension     Intracerebral hemorrhage ([address] 5/12/2013    Recurrent 8/6/13 - Right occipitoparietal    Neck pain     Unspecified cerebral artery occlusion with cerebral infarction        Past Surgical History:        Procedure Laterality Date    SHOULDER SURGERY         Social History:    Social History     Tobacco Use    Smoking status: Never    Smokeless tobacco: Never   Substance Use Topics    Alcohol use: Not Currently                                Counseling given: Not Answered      Vital Signs (Current):   Vitals:    03/07/23 0501 03/07/23 0627 03/07/23 0857 03/07/23 0901   BP: 100/67  (!) 146/130 83/67   Pulse: 77  (!) 104 97   Resp: 16      Temp: 97.2 °F (36.2 °C)      TempSrc: Temporal      SpO2: 92%      Weight:  157 lb 13.6 oz (71.6 kg)     Height:                                                  BP Readings from Last 3 Encounters:   03/07/23 83/67   05/24/22 (!) 135/92   10/05/17 132/74       NPO Status:                                                                                 BMI:   Wt Readings from Last 3 Encounters:   03/07/23 157 lb 13.6 oz (71.6 kg)   05/24/22 165 lb (74.8 kg)   05/06/20 178 lb (80.7 kg)     Body mass index is 21.41 kg/m².     CBC:   Lab Results   Component Value Date/Time    WBC 11.6 03/07/2023 05:09 AM    RBC 4.60 03/07/2023 05:09 AM    HGB 14.0 03/07/2023 05:09 AM    HCT 41.2 03/07/2023 05:09 AM    MCV 89.6 03/07/2023 05:09 AM    RDW 13.6 03/07/2023 05:09 AM     03/07/2023 05:09 AM       CMP:   Lab Results   Component Value Date/Time     03/07/2023 05:09 AM    K 4.8 03/07/2023 05:09 AM K 4.0 03/01/2023 12:33 PM     03/07/2023 05:09 AM    CO2 27 03/07/2023 05:09 AM    BUN 28 03/07/2023 05:09 AM    CREATININE 1.3 03/07/2023 05:09 AM    GFRAA >60 05/24/2022 11:06 AM    GFRAA 109 06/14/2013 08:26 PM    AGRATIO 1.0 03/01/2023 12:33 PM    LABGLOM >60 03/07/2023 05:09 AM    GLUCOSE 100 03/07/2023 05:09 AM    PROT 7.3 03/01/2023 12:33 PM    PROT 6.3 03/29/2012 09:42 AM    CALCIUM 9.2 03/07/2023 05:09 AM    BILITOT 1.0 03/01/2023 12:33 PM    ALKPHOS 120 03/01/2023 12:33 PM    AST 22 03/01/2023 12:33 PM    ALT 18 03/01/2023 12:33 PM       POC Tests: No results for input(s): POCGLU, POCNA, POCK, POCCL, POCBUN, POCHEMO, POCHCT in the last 72 hours. Coags:   Lab Results   Component Value Date/Time    PROTIME 11.3 08/06/2013 02:05 PM    INR 1.01 08/06/2013 02:05 PM    APTT 26.9 08/06/2013 02:05 PM       HCG (If Applicable): No results found for: PREGTESTUR, PREGSERUM, HCG, HCGQUANT     ABGs: No results found for: PHART, PO2ART, BEM1GZJ, APF6HUU, BEART, L4MLWDXJ     Type & Screen (If Applicable):  No results found for: LABABO, LABRH    Drug/Infectious Status (If Applicable):  No results found for: HIV, HEPCAB    COVID-19 Screening (If Applicable):   Lab Results   Component Value Date/Time    COVID19 Not Detected 03/01/2023 12:56 PM           Anesthesia Evaluation  Patient summary reviewed and Nursing notes reviewed no history of anesthetic complications:   Airway: Mallampati: II  TM distance: >3 FB   Neck ROM: full  Mouth opening: > = 3 FB   Dental:          Pulmonary:       (-) asthma and shortness of breath                           Cardiovascular:    (+) hypertension:,     (-)  angina          Echocardiogram reviewed    Cleared by cardiology              Neuro/Psych:   (+) CVA:, headaches:,             GI/Hepatic/Renal:        (-) GERD and liver disease       Endo/Other:        (-) diabetes mellitus, hypothyroidism               Abdominal:             Vascular:     - PVD.       Other Findings: Anesthesia Plan      MAC     ASA 3       Induction: intravenous. Anesthetic plan and risks discussed with patient. Plan discussed with CRNA.                     Dae Roman MD   3/7/2023

## 2023-03-07 NOTE — DISCHARGE SUMMARY
[facility]    Patient Demographics    Patient. Juan Coburn  Date of Birth. 1955  MRN. [id number]     Primary care provider. Jessie Reynoso MD  (Tel: [phone])    Admit date: 3/1/2023    Discharge date (blank if same as Note Date): Note Date: 3/7/2023     Reason for Hospitalization. Chief Complaint   Patient presents with    Shortness of Breath     Complaints of shortness of breath x1 week with ambulation, states coughing up blood tinged sputum for a few days, presents with a HR of 150-170, only hx of HTN         Significant Findings. Principal Problem:    Atrial fibrillation with rapid ventricular response (HCC)  Active Problems:    Acute systolic heart failure (HCC)    Moderate to severe mitral regurgitation    History of CVA (cerebrovascular accident)    Community acquired pneumonia    HFrEF (heart failure with reduced ejection fraction) ([facility] [address]  Resolved Problems:    * No resolved hospital problems. *       Problems and results from this hospitalization that need follow up. Atrial fib  Ischemic cardiomyopathy. CAD. Moderate to severe MR. Significant test results and incidental findings. CT Pulmonary 3/1/2023:  No evidence of pulmonary embolism. Mild infiltrate noted in the the left lower lobe, which may represent   pneumonia. CT Head 3/2/2023:  1. No acute intracranial abnormality. 2. Chronic right parietooccipital encephalomalacia/gliosis. CXR 3/2/2023:  Cardiomegaly with mild pulmonary venous congestion. Echo 3/2/2023:  Summary   -Atrial fibrillation throughout the study.   -Left ventricular systolic function is reduced with ejection fraction   estimated at 25 %. -Global hypokinesis. .   -Sigmoid septum is present with normal thickness of remaining left   ventricular wall segments.   -Left ventricular size is mildly increased. -Bi-atrial enlargement.   -Aortic valve appears sclerotic but opens adequately. -Mitral valve leaflets appear mildly thickened. -Mild mitral valve prolapse of the anterior leaflet.   -Severe mitral regurgitation with posteriorly directed jet. -Mild tricuspid regurgitation. -IVC size is dilated (>2.1 cm) but collapses > 50% with respiration   consistent with elevated RA pressure (8 mmHg). -The ascending aorta is mildly dilated. 3.8 cm      Invasive procedures and treatments. [facility] 3/3/2023:  Dominance: Co      LM: 20% proximal   LAD: 40% mid stenosis; 30% proximal first diagonal   LCx: large vessel with moderate sized obtuse marginals (85% proximal stenosis of OM1, 40% proximal stenosis of OM2), 60% distal lesion  RCA: 40% proximal, 99% mid, 30% distal stenoses; SILVIA II flow beyond critical lesion      LVEDP: 7 mmHg  LVEF: 20-25%     3.5 x 23 mm Xience WILFREDO deployed to 16 HAWA     0% residual, SILVIA III flow of distal RCA      Given significant restlessness throughout and eventual need for arm and head restraints, decision was made to end procedure with good PCI result before potential for patient harming oneself or requiring general anesthesia. Impression/Recommendations  S/P Successful PCI to critical mid RCA with one drug eluting stent. Plavix loaded in lab and recommended for at least 3 months. Therapeutic Lovenox can be continued prior to day of staged PCI-OM, tentatively 3/6/2023 (consider anesthesia). EP decision regarding ARAMIS/DCCV and AC following. Avoid triple therapy given history of ICH. Moderate intensity statin. Guideline directed beta blocker, ACE inhibitor and aldosterone antagonist.      [facility] 3/6/2023:  Anatomy:   LM-nml   LAD-mid 70-80%  Cx-distal 60%  OM1 prox 85%  OM2 prox 90%  RCA-mid 50% stent under expansion  RPDA- nml     FFR LAD 0.77     Intervention  ~Successful PCI to RCA stent with 3.75x15 NC balloon angioplasty. PCI to OM1 with 2.25x18 WILFREDO. PCI to OM2 with 2.5x23 WILFREDO.  PCI to LAD with 3.5x33 WILFREDO. Excellent Result. Impression  ~Coronary Angiography w/ severe MVD  ~Successful complex angioplasty and stenting of RCA/OM/LAD      Recommendation  ~Aggressive medical treatment and risk factor modification  ~1. Post cath IVF. Bedrest.   2. Recommend beta blocker, ace/arb, high potency statin, eliquis and plavix. No aspirin due to high bleed risk with triple therapy. 3. Referral to outpatient cardiac rehab phase II will be deferred until patient follow-up in office and then determine patient safety and appropriateness to proceed  4. Patient has been advised on the importance of regular exercise of at least 20-30 minutes daily. 5. Patient counseled about and offered assistance for smoking cessation   6. Plan for ARAMIS/Cardioversion tomorrow am.      3/7/2023: Failed external DC cardioversion of atrial fibrillation       Problem-based Hospital Course. 78 yo male with hx HTN, ICH. Presented to hospital with shortness of breath, chest pain, blood tinged sputum. Found to be in atrial fib with RVR, started on diltiazem drip. CT pulmonary negative for PE but noted mild infiltrate LLL. Acute sCHF / new onset cardiomyopathy. Presents with dyspnea, chest discomfort, mild hemoptysis. CXR with cardiomegaly and mild pulmonary edema, proBNP > 10K. CT pulmonary negative for PE. Received IV Lasix, transitioned to po. Echo with EF 25%. Likely combination of tachycardia induced and ischemic heart disease. Discharged on lisinopril, Toprol, digoxin, Lasix, spironolactone (K 4.8) per cards recs. BMP within 1 week, order in [facility]. Has cardiology follow up scheduled for 3/16. Atrial fibrillation with RVR. Rates to 170s on presentation. Started on diltiazem drip, po beta blocker. S/p failed cardioversion 3/7, each time patient converted to sinus rhythm but atrial fib recurred. He was started on amiodarone with plans for repeat cardioversion in 4 weeks. High risk for stroke/thromboembolism.  On Eliquis and Plavix per interventional cardiology. Hx of ICH in past, risk of repeat ICH with longterm AC. CHENCHO occlusion with Watchman will be considered in future. CAD. Echo with EF 25%, global hypokinesis. S/p LHC 3/3 with multivessel disease and stent to critical mid RCA. Staged procedure 3/6 with PCI to RCA stent and WILFREDO to OM1, OM2, and LAD. Interventional cardiology recommends Eliquis and Plavix, no asa as high risk of bleed with triple therapy. Questionable pneumonia. CT scan with mild infiltrate LLL. Started on empiric antibiotics on admission. Procalcitonin 0.07 and antibiotics subsequently discontinued. COVID and influenza testing negative. Remains afebrile. HTN. BP on soft side this am 83/67. Rechecked during visit 104/70. Patient asymptomatic. Moderate to severe MR. Medical management. Patient feels good, eager for DC home. Denies chest pain, shortness of breath, palpitations, lightheadedness, dizziness. Reviewed DC plans, follow up and medications. Expresses understanding. Questions answered. Consults. IP CONSULT TO CARDIOLOGY  IP CONSULT TO CARDIAC REHAB  IP CONSULT TO CARDIAC REHAB    Physical examination on discharge day. BP 83/67   Pulse 97   Temp 97.2 °F (36.2 °C) (Temporal)   Resp 16   Ht 6' (1.829 m)   Wt 157 lb 13.6 oz (71.6 kg)   SpO2 92%   BMI 21.41 kg/m²   General appearance. Alert. Looks comfortable. HEENT. Sclera clear. Moist mucus membranes. Cardiovascular. Irregular rate and rhythm, normal S1, S2. No murmur. Respiratory. Not using accessory muscles. Clear to auscultation bilaterally, no wheeze. Gastrointestinal. Abdomen soft, non-tender, not distended, normal bowel sounds  Neurology. Facial symmetry. No speech deficits. Moving all extremities equally. Extremities. No edema in lower extremities. Skin. Warm, dry, normal turgor    Condition at time of discharge stable    Medication instructions provided to patient at discharge.      Medication List        START taking these medications      amiodarone 200 MG tablet  Commonly known as: CORDARONE  Take 1 tablet by mouth 2 times daily for 7 days, THEN 1 tablet daily.  Start taking on: March 7, 2023     apixaban 5 MG Tabs tablet  Commonly known as: ELIQUIS  Take 1 tablet by mouth 2 times daily     clopidogrel 75 MG tablet  Commonly known as: PLAVIX  Take 1 tablet by mouth daily  Start taking on: March 8, 2023     furosemide 40 MG tablet  Commonly known as: LASIX  Take 1 tablet by mouth daily  Start taking on: March 8, 2023     metoprolol succinate 50 MG extended release tablet  Commonly known as: TOPROL XL  Take 1 tablet by mouth in the morning and at bedtime     spironolactone 25 MG tablet  Commonly known as: ALDACTONE  Take 1 tablet by mouth daily  Start taking on: March 8, 2023            CHANGE how you take these medications      atorvastatin 80 MG tablet  Commonly known as: LIPITOR  Take 1 tablet by mouth nightly  What changed:   medication strength  See the new instructions.     lisinopril 2.5 MG tablet  Commonly known as: PRINIVIL;ZESTRIL  Take 1 tablet by mouth daily  Start taking on: March 8, 2023  What changed: medication strength     omeprazole 20 MG delayed release capsule  Commonly known as: PRILOSEC  TAKE 1 CAPSULE BY MOUTH DAILY  What changed: See the new instructions.            CONTINUE taking these medications      fluticasone 50 MCG/ACT nasal spray  Commonly known as: FLONASE     GLUCOSAMINE PO     oxyCODONE 15 MG immediate release tablet  Commonly known as: OXY-IR            STOP taking these medications      amLODIPine 10 MG tablet  Commonly known as: NORVASC     Avanafil 200 MG Tabs  Commonly known as: Stendra     clonazePAM 0.5 MG tablet  Commonly known as: KlonoPIN     diclofenac sodium 1 % Gel  Commonly known as: Voltaren     nystatin 253676 UNIT/GM cream  Commonly known as: MYCOSTATIN     propranolol 20 MG tablet  Commonly known as: INDERAL     sildenafil 100 MG tablet  Commonly known as: Viagra    tadalafil 5 MG tablet  Commonly known as: Cialis               Where to Get Your Medications        These medications were sent to [address] [place] [address], [address] [place] [address] Drive  [address] [facility] [place], [address] [place] [id number]      Phone: [phone]   amiodarone 200 MG tablet  apixaban 5 MG Tabs tablet  atorvastatin 80 MG tablet  clopidogrel 75 MG tablet  furosemide 40 MG tablet  lisinopril 2.5 MG tablet  metoprolol succinate 50 MG extended release tablet  spironolactone 25 MG tablet     DC meds above, with exception of amlodipine, patient reported as no longer taking    Discharge recommendations given to patient. Follow Up. Cardiology in 1 week   Disposition. home  Activity. activity as tolerated  Diet: ADULT DIET; Regular; Low Fat/Low Chol/High Fiber/2 gm Na      Spent > 30 minutes in discharge process.     Signed:  GOLDEN Michele - CNP     3/7/2023 3:59 PM

## 2023-03-07 NOTE — DISCHARGE INSTRUCTIONS
Lab recheck within 1 week to monitor kidney numbers and potassium level with new medications. Post Angiogram/ Intervention Discharge Instructions      Do you have the help you need at home? Activity:  You may drive in [employer] unless instructed by your doctor   Resume normal activity in one week  Avoid lifting, pushing, or pulling more than 10 lbs. For one week. (A gallon of milk is 7 lbs)  Limit stair climbing to once a day for two weeks. You may walk at an easy pace on ground level. Plan rest periods during the day. Avoid tension and stress. Learn to manage your stress. Avoid work that increases muscle tension.        (straining with bowel movements, moving furniture)    Wound Care: You may shower, but no bathtubs, pools, or hot tubs for one week. Inspect area daily. Normal observations:  Soreness and tenderness that may last for one week, mild pink to red oozing from incision site for up to 24 hrs after discharge,    bruising that could last up to two weeks. Clean with soap and water. NO lotion or powder. Dry area thoroughly. Apply band    aide for 48 hrs. Nutrition:   Low fat, low salt diet (guidelines for Heart Healthy eating)  Limit caffeine to 1-2 cups per day (coffee, tea, chocolate, soda)  Eat high fiber to avoid constipation and straining during bowel movements (fresh veggies and fruit, whole grain)  Limit alcohol to two servings a day ( 8 oz beer, 1 oz liquor, 4 oz wine )  Drink at least 8 to 10 glasses of decaffeinated, non-alcoholic fluid for the next 24 hours to flush the x-ray dye used for your angiogram out of your body. Depression: It is not unusual to have feelings of anxiety, fear, or depression after your procedure. If you need help with these feelings, call your primary care physician. There are medications to help you and healing usually occurs sooner if you are not depressed.     Cardiac Rehab Information Given : [phone]  Your physician has referred you to Cardiac Rehab. This is an important part of your recovery. You have been given a brief explanation of Cardiac Rehab and instructions when to call Cardiac Rehab. The Cardiac Rehab team works with your cardiologist to start your Rehab at the appropriate time in your recovery. Remember to discuss Cardiac Rehab with your physician at your first follow-up visit. What symptoms or health problems do you need to look out for after you leave the hospital? Call your physician if you have any of these symptoms.      Increased shortness of breath, weakness, or increased fatigue  A fast or a slow heartbeat  Problems or questions with your medications  Bleeding that is not stopped after holding pressure for 10 min  Feeling lightheaded or dizzy  Increased swelling or bruising of the groin or leg  Unusual pain, numbness or tingling at the groin or down the leg  Any signs of infection ( redness, yellow drainage, swelling, pain, fever)  Unusual swelling of lower legs/feet, chest discomfort, unusual weakness or fatigue

## 2023-03-07 NOTE — PROGRESS NOTES
H&P Update    I have reviewed the history and physical and examined the patient and updated with relevant changes. Consent: I have discussed with the patient and/or the patient representative the indication, alternatives, and the possible risks and/or complications of the planned procedure and the anesthesia methods. The patient and/or patient representative appear to understand and agree to proceed. Vitals:    03/07/23 0901   BP: 83/67   Pulse: 97   Resp:    Temp:    SpO2:      Prior to Admission medications    Medication Sig Start Date End Date Taking? Authorizing Provider   propranolol (INDERAL) 20 MG tablet Take 20 mg by mouth daily   Yes Historical Provider, MD   Glucosamine HCl (GLUCOSAMINE PO) Take 1,000 mg by mouth daily   Yes Historical Provider, MD   fluticasone (FLONASE) 50 MCG/ACT nasal spray 2 sprays by Each Nostril route daily   Yes Historical Provider, MD   oxyCODONE (OXY-IR) 15 MG immediate release tablet Take 15 mg by mouth 3 times daily as needed for Pain. Yes Historical Provider, MD   diclofenac sodium (VOLTAREN) 1 % GEL Apply 4 g topically 4 times daily  Patient not taking: Reported on 3/1/2023 5/6/20 6/5/20  ANANTH Muse   clonazePAM (KLONOPIN) 0.5 MG tablet Take 1 tablet by mouth 2 times daily  Patient not taking: Reported on 3/1/2023 10/5/17   Susy Al MD   nystatin (MYCOSTATIN) 266185 UNIT/GM cream Apply topically 2 times daily.   Patient not taking: Reported on 3/1/2023 10/5/17   Susy Al MD   Avanafil (STENDRA) 200 MG TABS Take 1 tablet by mouth daily  Patient not taking: Reported on 3/1/2023 10/5/17   Susy Al MD   omeprazole (PRILOSEC) 20 MG delayed release capsule TAKE 1 CAPSULE BY MOUTH DAILY  Patient taking differently: Take 20 mg by mouth Daily 10/5/17   Susy Al MD   atorvastatin (LIPITOR) 40 MG tablet TAKE 1 TABLET BY MOUTH EVERY DAY 10/5/17   Susy Al MD   lisinopril (PRINIVIL;ZESTRIL) 20 MG tablet TAKE 1 TABLET BY MOUTH DAILY  Patient not taking: Reported on 3/1/2023 10/5/17   Sean Doe MD   amLODIPine (NORVASC) 10 MG tablet TAKE 1 TABLET BY MOUTH DAILY 10/5/17   Sean Doe MD   sildenafil (VIAGRA) 100 MG tablet Take 1 tablet by mouth as needed for Erectile Dysfunction  Patient not taking: Reported on 3/1/2023 6/9/17   Sean Doe MD   tadalafil (CIALIS) 5 MG tablet One po daily for erectile dysfunction  Patient not taking: Reported on 3/1/2023 1/22/16   Sean Doe MD     Past Medical History:   Diagnosis Date    Headache     Hypertension     Intracerebral hemorrhage ([facility] [address] 5/12/2013    Recurrent 8/6/13 - Right occipitoparietal    Neck pain     Unspecified cerebral artery occlusion with cerebral infarction      Past Surgical History:   Procedure Laterality Date    SHOULDER SURGERY       No Known Allergies    Documentation and Exam:   I have personally completed a history, physical exam & review of systems for this patient (see notes).     Sedation will be provided by anesthesia team.     Electronically signed by Tobias Linares MD on 3/7/2023 at 2:37 PM

## 2023-03-07 NOTE — PROGRESS NOTES
[address] Daily Progress Note      Admit Date:  3/1/2023    Chief Complaint   Patient presents with    Shortness of Breath     Complaints of shortness of breath x1 week with ambulation, states coughing up blood tinged sputum for a few days, presents with a HR of 150-170, only hx of HTN        Subjective:  Mr. Trever Callahan denies exertional chest pain, SOB/VALADEZ, PND, palpitations, light-headedness, or edema.  R wrist CDI    Objective:   BP 83/67   Pulse 97   Temp 97.2 °F (36.2 °C) (Temporal)   Resp 16   Ht 6' (1.829 m)   Wt 157 lb 13.6 oz (71.6 kg)   SpO2 92%   BMI 21.41 kg/m²     Intake/Output Summary (Last 24 hours) at 3/7/2023 1354  Last data filed at 3/7/2023 1326  Gross per 24 hour   Intake 300 ml   Output --   Net 300 ml       TELEMETRY: Atrial fibrillation     Physical Exam:  General:  Awake, alert, oriented x 3, NAD  Skin:  Warm and dry  Neck:  JVD flat  Chest:  normal air entry  Cardiovascular:  irregular RR S1S2, no S3, no mrmr  Abdomen:  Soft, ND, NT, No HSM  Extremities:  No edema    Medications:    metoprolol succinate  50 mg Oral BID    [START ON 3/8/2023] furosemide  40 mg Oral Daily    atorvastatin  80 mg Oral Nightly    sodium chloride flush  5-40 mL IntraVENous 2 times per day    apixaban  5 mg Oral BID    digoxin  125 mcg Oral Daily    spironolactone  25 mg Oral Daily    clopidogrel  75 mg Oral Daily    sodium chloride flush  5-40 mL IntraVENous 2 times per day    lisinopril  2.5 mg Oral Daily    sodium chloride flush  5-40 mL IntraVENous 2 times per day      sodium chloride      sodium chloride      sodium chloride       sodium chloride flush, sodium chloride, acetaminophen, oxyCODONE-acetaminophen **OR** oxyCODONE-acetaminophen, ondansetron, potassium chloride **OR** potassium alternative oral replacement **OR** potassium chloride, diphenhydrAMINE, sodium chloride flush, sodium chloride, clonazePAM, perflutren lipid microspheres, sodium chloride flush, sodium chloride, ondansetron **OR** [DISCONTINUED] ondansetron, polyethylene glycol, [DISCONTINUED] acetaminophen **OR** acetaminophen    Lab Data:  CBC:   Recent Labs     03/05/23  0540 03/06/23  0851 03/07/23  0509   WBC 10.5 11.4* 11.6*   HGB 13.8 15.0 14.0   HCT 39.8* 44.5 41.2   MCV 89.0 89.2 89.6    294 288     BMP:   Recent Labs     03/05/23  0540 03/06/23  0851 03/07/23  0509    140 138   K 4.6 4.6 4.8    102 102   CO2 27 28 27   BUN 21* 28* 28*   CREATININE 1.0 1.1 1.3     LIVER PROFILE: No results for input(s): AST, ALT, LIPASE, BILIDIR, BILITOT, ALKPHOS in the last 72 hours. Invalid input(s): AMYLASE,  ALB  PT/INR: No results for input(s): PROTIME, INR in the last 72 hours. APTT: No results for input(s): APTT in the last 72 hours. BNP:  No results for input(s): BNP in the last 72 hours. IMAGING:     Assessment/Plan:  Principal Problem:    Atrial fibrillation with rapid ventricular response (HCC)  Plan: afib with RVR, awaiting ARAMIS/DCCV with anesthesia. Cont eliquis. Active Problems:    Acute systolic heart failure (HCC)  Plan: severe exacerbation now improved. Ef<30%. CAD: severe 3VD. S/p PCI to the RCA/LAD/LCX. Cont Plavix and eliquis. No aspirin due to high bleed risk with triple therapy. OK to dc home after ARAMIS/Cardioversion from my standpoint.         Aldo West MD, 3/7/2023 1:54 PM

## 2023-03-07 NOTE — ANESTHESIA POSTPROCEDURE EVALUATION
Department of Anesthesiology  Postprocedure Note    Patient: Chapin Deleon  MRN: [id number]  YOB: 1955  Date of evaluation: 3/7/2023      Procedure Summary     Date: 03/07/23 Room / Location: [facility] Lab    Anesthesia Start: 5673 Anesthesia Stop: 1324    Procedure: ECHO ARAMIS IN CARDIAC CATH Diagnosis:     Scheduled Providers:  Responsible Provider: Dacia Stone MD    Anesthesia Type: MAC ASA Status: 3          Anesthesia Type: No value filed. Hayden Phase I:      Hayden Phase II:        Anesthesia Post Evaluation    Patient location during evaluation: PACU  Level of consciousness: awake  Airway patency: patent  Complications: no  Cardiovascular status: hemodynamically stable  Respiratory status: acceptable  There was medical reason for not using a multimodal analgesia pain management approach.

## 2023-03-07 NOTE — PLAN OF CARE
Problem: Discharge Planning  Goal: Discharge to home or other facility with appropriate resources  Outcome: Progressing  Flowsheets (Taken 3/6/2023 2025)  Discharge to home or other facility with appropriate resources: Identify barriers to discharge with patient and caregiver     Problem: Safety - Adult  Goal: Free from fall injury  Outcome: Progressing     Problem: Cardiovascular - Adult  Goal: Maintains optimal cardiac output and hemodynamic stability  Outcome: Progressing  Goal: Absence of cardiac dysrhythmias or at baseline  Outcome: Progressing     Problem: Chronic Conditions and Co-morbidities  Goal: Patient's chronic conditions and co-morbidity symptoms are monitored and maintained or improved  Outcome: Progressing  Flowsheets (Taken 3/6/2023 2025)  Care Plan - Patient's Chronic Conditions and Co-Morbidity Symptoms are Monitored and Maintained or Improved: Monitor and assess patient's chronic conditions and comorbid symptoms for stability, deterioration, or improvement     Problem: Pain  Goal: Verbalizes/displays adequate comfort level or baseline comfort level  Outcome: Progressing

## 2023-03-08 RX ORDER — CLOPIDOGREL BISULFATE 75 MG/1
75 TABLET ORAL DAILY
Qty: 90 TABLET | OUTPATIENT
Start: 2023-03-08

## 2023-03-08 RX ORDER — SPIRONOLACTONE 25 MG/1
25 TABLET ORAL DAILY
Qty: 90 TABLET | OUTPATIENT
Start: 2023-03-08

## 2023-03-08 RX ORDER — ATORVASTATIN CALCIUM 80 MG/1
TABLET, FILM COATED ORAL
Qty: 90 TABLET | OUTPATIENT
Start: 2023-03-08

## 2023-03-08 RX ORDER — AMIODARONE HYDROCHLORIDE 200 MG/1
TABLET ORAL
Qty: 97 TABLET | Refills: 0 | OUTPATIENT
Start: 2023-03-08

## 2023-03-08 RX ORDER — LISINOPRIL 2.5 MG/1
2.5 TABLET ORAL DAILY
Qty: 90 TABLET | OUTPATIENT
Start: 2023-03-08

## 2023-03-08 RX ORDER — METOPROLOL SUCCINATE 50 MG/1
50 TABLET, EXTENDED RELEASE ORAL 2 TIMES DAILY
Qty: 180 TABLET | OUTPATIENT
Start: 2023-03-08

## 2023-03-09 ENCOUNTER — TELEPHONE (OUTPATIENT)
Dept: CARDIOLOGY CLINIC | Age: 68
End: 2023-03-09

## 2023-03-09 NOTE — TELEPHONE ENCOUNTER
Called pt, went to  but message saying VM Box has not been set up. I will try again to call to schedule CV. Please transfer call to [address] [facility].

## 2023-03-16 ENCOUNTER — OFFICE VISIT (OUTPATIENT)
Dept: CARDIOLOGY CLINIC | Age: 68
End: 2023-03-16
Payer: MEDICARE

## 2023-03-16 ENCOUNTER — HOSPITAL ENCOUNTER (OUTPATIENT)
Age: 68
Discharge: HOME | End: 2023-03-16
Payer: MEDICARE

## 2023-03-16 VITALS
HEART RATE: 70 BPM | SYSTOLIC BLOOD PRESSURE: 120 MMHG | BODY MASS INDEX: 22.33 KG/M2 | HEIGHT: 70 IN | WEIGHT: 156 LBS | OXYGEN SATURATION: 97 % | DIASTOLIC BLOOD PRESSURE: 68 MMHG

## 2023-03-16 DIAGNOSIS — I50.20 HFREF (HEART FAILURE WITH REDUCED EJECTION FRACTION) (HCC): ICD-10-CM

## 2023-03-16 DIAGNOSIS — E78.2 MIXED HYPERLIPIDEMIA: ICD-10-CM

## 2023-03-16 DIAGNOSIS — I50.22 CHRONIC SYSTOLIC CONGESTIVE HEART FAILURE (HCC): ICD-10-CM

## 2023-03-16 DIAGNOSIS — I34.0 MITRAL VALVE INSUFFICIENCY, UNSPECIFIED ETIOLOGY: ICD-10-CM

## 2023-03-16 DIAGNOSIS — I25.10 CORONARY ARTERY DISEASE INVOLVING NATIVE HEART WITHOUT ANGINA PECTORIS, UNSPECIFIED VESSEL OR LESION TYPE: Primary | ICD-10-CM

## 2023-03-16 LAB
ANION GAP SERPL CALCULATED.3IONS-SCNC: 16 MMOL/L (ref 3–16)
BUN SERPL-MCNC: 36 MG/DL (ref 7–20)
CALCIUM SERPL-MCNC: 9.2 MG/DL (ref 8.3–10.6)
CHLORIDE SERPL-SCNC: 105 MMOL/L (ref 99–110)
CO2 SERPL-SCNC: 20 MMOL/L (ref 21–32)
CREAT SERPL-MCNC: 1.6 MG/DL (ref 0.8–1.3)
GFR SERPLBLD CREATININE-BSD FMLA CKD-EPI: 47 ML/MIN/{1.73_M2}
GLUCOSE SERPL-MCNC: 93 MG/DL (ref 70–99)
NT-PROBNP SERPL-MCNC: 1995 PG/ML (ref 0–124)
POTASSIUM SERPL-SCNC: 4.8 MMOL/L (ref 3.5–5.1)
SODIUM SERPL-SCNC: 141 MMOL/L (ref 136–145)

## 2023-03-16 PROCEDURE — 1123F ACP DISCUSS/DSCN MKR DOCD: CPT | Performed by: NURSE PRACTITIONER

## 2023-03-16 PROCEDURE — 3078F DIAST BP <80 MM HG: CPT | Performed by: NURSE PRACTITIONER

## 2023-03-16 PROCEDURE — 93000 ELECTROCARDIOGRAM COMPLETE: CPT | Performed by: NURSE PRACTITIONER

## 2023-03-16 PROCEDURE — 83880 ASSAY OF NATRIURETIC PEPTIDE: CPT

## 2023-03-16 PROCEDURE — 80048 BASIC METABOLIC PNL TOTAL CA: CPT

## 2023-03-16 PROCEDURE — 3074F SYST BP LT 130 MM HG: CPT | Performed by: NURSE PRACTITIONER

## 2023-03-16 PROCEDURE — 36415 COLL VENOUS BLD VENIPUNCTURE: CPT

## 2023-03-16 PROCEDURE — 99214 OFFICE O/P EST MOD 30 MIN: CPT | Performed by: NURSE PRACTITIONER

## 2023-03-16 RX ORDER — PANTOPRAZOLE SODIUM 20 MG/1
20 TABLET, DELAYED RELEASE ORAL
Qty: 90 TABLET | Refills: 0 | Status: SHIPPED | OUTPATIENT
Start: 2023-03-16

## 2023-03-16 NOTE — PATIENT INSTRUCTIONS
Bloating or Lack of Appetite  Feeling full or sick to your stomach after eating small meals or more so than usual  Abdominal bloating  Confusion or Impaired Thinking  Memory loss, a foggy feeling or confusion  This might be better observed by friends or family

## 2023-03-16 NOTE — PROGRESS NOTES
[address]     Outpatient Follow Up Note    CHIEF COMPLAINT / HPI: Hospital Follow Up secondary to status post coronary artery stenting    Hospital record has been reviewed  Hospital Course progressed as follows per discharge summary:     [address] [place]. 78 yo male with hx HTN, ICH. Presented to hospital with shortness of breath, chest pain, blood tinged sputum. Found to be in atrial fib with RVR, started on diltiazem drip. CT pulmonary negative for PE but noted mild infiltrate LLL. Acute sCHF / new onset cardiomyopathy. Presents with dyspnea, chest discomfort, mild hemoptysis. CXR with cardiomegaly and mild pulmonary edema, proBNP > 10K. CT pulmonary negative for PE. Received IV Lasix, transitioned to po. Echo with EF 25%. Likely combination of tachycardia induced and ischemic heart disease. Discharged on lisinopril, Toprol, digoxin, Lasix, spironolactone (K 4.8) per cards recs. BMP within 1 week, order in [facility]. Has cardiology follow up scheduled for 3/16. Atrial fibrillation with RVR. Rates to 170s on presentation. Started on diltiazem drip, po beta blocker. S/p failed cardioversion 3/7, each time patient converted to sinus rhythm but atrial fib recurred. He was started on amiodarone with plans for repeat cardioversion in 4 weeks. High risk for stroke/thromboembolism. On Eliquis and Plavix per interventional cardiology. Hx of ICH in past, risk of repeat ICH with longterm AC. CHENCHO occlusion with Watchman will be considered in future. CAD. Echo with EF 25%, global hypokinesis. S/p LHC 3/3 with multivessel disease and stent to critical mid RCA. Staged procedure 3/6 with PCI to RCA stent and WILFREDO to OM1, OM2, and LAD. Interventional cardiology recommends Eliquis and Plavix, no asa as high risk of bleed with triple therapy. Questionable pneumonia. CT scan with mild infiltrate LLL. Started on empiric antibiotics on admission.  Procalcitonin 0.07 and antibiotics subsequently

## 2023-03-17 ENCOUNTER — TELEPHONE (OUTPATIENT)
Dept: CARDIOLOGY CLINIC | Age: 68
End: 2023-03-17

## 2023-03-17 DIAGNOSIS — Z79.899 HIGH RISK MEDICATION USE: Primary | ICD-10-CM

## 2023-03-17 DIAGNOSIS — I50.20 HFREF (HEART FAILURE WITH REDUCED EJECTION FRACTION) (HCC): ICD-10-CM

## 2023-03-17 NOTE — TELEPHONE ENCOUNTER
Pt called to inform kl that he found his original paperwork and he does not need the other one. Thank you per Pt.    JO

## 2023-03-20 ENCOUNTER — TELEPHONE (OUTPATIENT)
Dept: CARDIOLOGY CLINIC | Age: 68
End: 2023-03-20

## 2023-03-21 ENCOUNTER — HOSPITAL ENCOUNTER (OUTPATIENT)
Age: 68
Discharge: HOME | End: 2023-03-21
Payer: MEDICARE

## 2023-03-21 DIAGNOSIS — I50.20 HFREF (HEART FAILURE WITH REDUCED EJECTION FRACTION) (HCC): ICD-10-CM

## 2023-03-21 DIAGNOSIS — Z79.899 HIGH RISK MEDICATION USE: ICD-10-CM

## 2023-03-21 LAB
ANION GAP SERPL CALCULATED.3IONS-SCNC: 12 MMOL/L (ref 3–16)
BUN SERPL-MCNC: 26 MG/DL (ref 7–20)
CALCIUM SERPL-MCNC: 9.2 MG/DL (ref 8.3–10.6)
CHLORIDE SERPL-SCNC: 108 MMOL/L (ref 99–110)
CO2 SERPL-SCNC: 21 MMOL/L (ref 21–32)
CREAT SERPL-MCNC: 1.4 MG/DL (ref 0.8–1.3)
GFR SERPLBLD CREATININE-BSD FMLA CKD-EPI: 55 ML/MIN/{1.73_M2}
GLUCOSE SERPL-MCNC: 89 MG/DL (ref 70–99)
NT-PROBNP SERPL-MCNC: 2781 PG/ML (ref 0–124)
POTASSIUM SERPL-SCNC: 5.2 MMOL/L (ref 3.5–5.1)
SODIUM SERPL-SCNC: 141 MMOL/L (ref 136–145)

## 2023-03-21 PROCEDURE — 83880 ASSAY OF NATRIURETIC PEPTIDE: CPT

## 2023-03-21 PROCEDURE — 80048 BASIC METABOLIC PNL TOTAL CA: CPT

## 2023-03-21 PROCEDURE — 36415 COLL VENOUS BLD VENIPUNCTURE: CPT

## 2023-03-24 DIAGNOSIS — Z79.899 HIGH RISK MEDICATION USE: ICD-10-CM

## 2023-03-24 DIAGNOSIS — I50.21 ACUTE SYSTOLIC HEART FAILURE (HCC): Primary | ICD-10-CM

## 2023-03-24 RX ORDER — SPIRONOLACTONE 25 MG/1
12.5 TABLET ORAL DAILY
Qty: 30 TABLET | Refills: 5
Start: 2023-03-24

## 2023-03-29 ENCOUNTER — TELEPHONE (OUTPATIENT)
Dept: CARDIOLOGY CLINIC | Age: 68
End: 2023-03-29

## 2023-03-29 NOTE — TELEPHONE ENCOUNTER
Pt asking if his lab order could be faxed to [facility] today. He want to get draw done tomorrow.  Please fax to [phone]

## 2023-04-06 ENCOUNTER — HOSPITAL ENCOUNTER (OUTPATIENT)
Dept: CARDIAC CATH/INVASIVE PROCEDURES | Age: 68
Discharge: HOME | End: 2023-04-06
Attending: INTERNAL MEDICINE | Admitting: INTERNAL MEDICINE
Payer: MEDICARE

## 2023-04-06 VITALS
HEIGHT: 72 IN | RESPIRATION RATE: 20 BRPM | TEMPERATURE: 98 F | DIASTOLIC BLOOD PRESSURE: 106 MMHG | HEART RATE: 71 BPM | OXYGEN SATURATION: 99 % | BODY MASS INDEX: 21.26 KG/M2 | SYSTOLIC BLOOD PRESSURE: 150 MMHG | WEIGHT: 157 LBS

## 2023-04-06 LAB
EKG ATRIAL RATE: 375 BPM
EKG ATRIAL RATE: 52 BPM
EKG DIAGNOSIS: NORMAL
EKG DIAGNOSIS: NORMAL
EKG P AXIS: 60 DEGREES
EKG P-R INTERVAL: 180 MS
EKG Q-T INTERVAL: 410 MS
EKG Q-T INTERVAL: 494 MS
EKG QRS DURATION: 126 MS
EKG QRS DURATION: 128 MS
EKG QTC CALCULATION (BAZETT): 459 MS
EKG QTC CALCULATION (BAZETT): 484 MS
EKG R AXIS: -22 DEGREES
EKG R AXIS: -34 DEGREES
EKG T AXIS: 123 DEGREES
EKG T AXIS: 63 DEGREES
EKG VENTRICULAR RATE: 52 BPM
EKG VENTRICULAR RATE: 84 BPM

## 2023-04-06 PROCEDURE — 93005 ELECTROCARDIOGRAM TRACING: CPT | Performed by: INTERNAL MEDICINE

## 2023-04-06 PROCEDURE — 92960 CARDIOVERSION ELECTRIC EXT: CPT | Performed by: INTERNAL MEDICINE

## 2023-04-06 PROCEDURE — 2500000003 HC RX 250 WO HCPCS

## 2023-04-06 PROCEDURE — 92960 CARDIOVERSION ELECTRIC EXT: CPT

## 2023-04-06 RX ORDER — SODIUM CHLORIDE 0.9 % (FLUSH) 0.9 %
5-40 SYRINGE (ML) INJECTION PRN
Status: DISCONTINUED | OUTPATIENT
Start: 2023-04-06 | End: 2023-04-06 | Stop reason: HOSPADM

## 2023-04-06 NOTE — H&P
history, physical exam & review of systems for this patient (see notes).     Mallampati Airway Assessment:  Class II     Prior History of Anesthesia Complications:   None    ASA Classification:  Class 3 - A patient with severe systemic disease that limits activity but is not incapacitating    Sedation/ Anesthesia Plan:   Intravenous sedation    Medications Planned:   Brevital intravenously     Patient is an appropriate candidate for plan of sedation:   Yes    Electronically signed by Cony Kapadia MD on 4/6/2023 at 7:39 AM

## 2023-04-06 NOTE — PROCEDURES
[address]     Electrophysiology Procedure Note       Date of Procedure: 4/6/2023  Patient's Name: Mary Farmer  YOB: 1955   Medical Record Number: [id number]  Procedure Performed by: Nithin Ariza MD    Procedures performed:    External Electrical cardioversion   IV sedation. Start time: 8:05 AM  Stop time: 8:10 AM    Medication: Brevital Sodium   Sedation medication was given by physician     Indication of the procedure: Persistent atrial fibrillation     Details of procedure: The patient was brought to the cath lab area in a fasting and non-sedated state. The risks, benefits and alternatives of the procedure were discussed with the patient. The patient opted to proceed with the procedure. Written informed consent was signed and placed in the chart. A timeout protocol was completed to identify the patient and the procedure being performed. Then we used brevital for sedation. 60 mg Brevital was given by me as one dose, and electrical DC cardioversion was perfomred using 200J, synchronized shock. Patient was converted to sinus rhythm. The patient tolerated the procedure well and there were no complications.      Conclusion:   Successful external DC cardioversion of atrial fibrillation

## 2023-04-07 RX ORDER — METOPROLOL SUCCINATE 50 MG/1
50 TABLET, EXTENDED RELEASE ORAL 2 TIMES DAILY
Qty: 60 TABLET | Refills: 3 | Status: SHIPPED | OUTPATIENT
Start: 2023-04-07 | End: 2023-05-03 | Stop reason: SDUPTHER

## 2023-04-17 ENCOUNTER — TELEPHONE (OUTPATIENT)
Dept: CARDIOLOGY CLINIC | Age: 68
End: 2023-04-17

## 2023-04-17 ENCOUNTER — OFFICE VISIT (OUTPATIENT)
Dept: CARDIOLOGY CLINIC | Age: 68
End: 2023-04-17
Payer: MEDICARE

## 2023-04-17 VITALS
DIASTOLIC BLOOD PRESSURE: 88 MMHG | HEIGHT: 70 IN | HEART RATE: 102 BPM | BODY MASS INDEX: 23.19 KG/M2 | WEIGHT: 162 LBS | OXYGEN SATURATION: 97 % | SYSTOLIC BLOOD PRESSURE: 152 MMHG

## 2023-04-17 DIAGNOSIS — I48.0 PAROXYSMAL ATRIAL FIBRILLATION (HCC): Primary | ICD-10-CM

## 2023-04-17 DIAGNOSIS — E78.2 MIXED HYPERLIPIDEMIA: ICD-10-CM

## 2023-04-17 DIAGNOSIS — I25.5 ISCHEMIC CARDIOMYOPATHY: ICD-10-CM

## 2023-04-17 DIAGNOSIS — I10 PRIMARY HYPERTENSION: ICD-10-CM

## 2023-04-17 DIAGNOSIS — I25.10 CORONARY ARTERY DISEASE INVOLVING NATIVE CORONARY ARTERY OF NATIVE HEART WITHOUT ANGINA PECTORIS: ICD-10-CM

## 2023-04-17 DIAGNOSIS — I50.20 HFREF (HEART FAILURE WITH REDUCED EJECTION FRACTION) (HCC): ICD-10-CM

## 2023-04-17 PROCEDURE — 93000 ELECTROCARDIOGRAM COMPLETE: CPT | Performed by: NURSE PRACTITIONER

## 2023-04-17 PROCEDURE — 3074F SYST BP LT 130 MM HG: CPT | Performed by: NURSE PRACTITIONER

## 2023-04-17 PROCEDURE — 99214 OFFICE O/P EST MOD 30 MIN: CPT | Performed by: NURSE PRACTITIONER

## 2023-04-17 PROCEDURE — 3078F DIAST BP <80 MM HG: CPT | Performed by: NURSE PRACTITIONER

## 2023-04-17 PROCEDURE — 1123F ACP DISCUSS/DSCN MKR DOCD: CPT | Performed by: NURSE PRACTITIONER

## 2023-04-17 RX ORDER — AMIODARONE HYDROCHLORIDE 200 MG/1
200 TABLET ORAL DAILY
COMMUNITY

## 2023-04-17 RX ORDER — AMLODIPINE BESYLATE 2.5 MG/1
2.5 TABLET ORAL DAILY
Qty: 30 TABLET | Refills: 3 | Status: SHIPPED | OUTPATIENT
Start: 2023-04-17

## 2023-04-17 NOTE — PROGRESS NOTES
orientated to person, place and time. PSYCH: Calm affect. SKIN: Warm and dry. HEENT: Sclera non-icteric, normocephalic, neck supple, no elevation of JVP, normal carotid pulses with no bruits and thyroid normal size. LUNGS:  No increased work of breathing and clear to auscultation, no crackles or wheezing  CARDIOVASCULAR:  IRRegular rate 80 and rhythm with no murmurs, gallops, rubs, or abnormal heart sounds, normal PMI. The apical impulses not displaced  JVP less than 8 cm H2O  Heart tones are crisp and normal  Cervical veins are not engorged  The carotid upstroke is normal in amplitude and contour without delay or bruit  JVP is not elevated  ABDOMEN:  Normal bowel sounds, non-distended and non-tender to palpation  EXT: No edema, no calf tenderness. Pulses are present bilaterally.     DATA:    Lab Results   Component Value Date    ALT 18 03/01/2023    AST 22 03/01/2023    ALKPHOS 120 03/01/2023    BILITOT 1.0 03/01/2023     Lab Results   Component Value Date    CREATININE 1.4 (H) 03/21/2023    BUN 26 (H) 03/21/2023     03/21/2023    K 5.2 (H) 03/21/2023     03/21/2023    CO2 21 03/21/2023     Lab Results   Component Value Date    TSH 0.58 03/01/2023     Lab Results   Component Value Date    WBC 11.6 (H) 03/07/2023    HGB 14.0 03/07/2023    HCT 41.2 03/07/2023    MCV 89.6 03/07/2023     03/07/2023     No components found for: CHLPL  Lab Results   Component Value Date    TRIG 176 (H) 05/24/2016    TRIG 173 (H) 01/22/2016    TRIG 251 (H) 10/14/2014     Lab Results   Component Value Date    HDL 54 05/24/2016    HDL 46 01/22/2016    HDL 44 10/14/2014     Lab Results   Component Value Date    LDLCALC 107 (H) 05/24/2016    LDLCALC 111 (H) 01/22/2016    LDLCALC 156 (H) 10/14/2014     Lab Results   Component Value Date    LABVLDL 35 05/24/2016    LABVLDL 35 01/22/2016    LABVLDL 50 10/14/2014     Radiology Review:  Pertinent images / reports were reviewed as a part of this visit and reveals the

## 2023-04-17 NOTE — TELEPHONE ENCOUNTER
Per TSNP - RMM would like patient to have ablation. Reviewed with RMM - please schedule patient for  atrial fib/flutter ablation     Scheduling letter sent.

## 2023-04-17 NOTE — PATIENT INSTRUCTIONS
Begin amlodipine 2.5 mg daily for BP control    Tresa Begun will call you to schedule an ablation with Dr. Krish Noel to take your amiodarone, Eliquis and metoprolol     Labs today : recheck your potassium level    Appt in four weeks

## 2023-04-21 ENCOUNTER — TELEPHONE (OUTPATIENT)
Dept: CARDIOLOGY CLINIC | Age: 68
End: 2023-04-21

## 2023-04-21 NOTE — TELEPHONE ENCOUNTER
Called pt to schedule the Ablation, he stated that he wanted to wait to speak with RMM on his appt 5-9 before scheduling anything. Please resend this request after 5-9 if pt agrees to proceed.   Please advise that he is scheduling out to the 1st of Sept.

## 2023-05-02 ENCOUNTER — TELEPHONE (OUTPATIENT)
Dept: CARDIOLOGY CLINIC | Age: 68
End: 2023-05-02

## 2023-05-03 RX ORDER — METOPROLOL SUCCINATE 50 MG/1
50 TABLET, EXTENDED RELEASE ORAL 2 TIMES DAILY
Qty: 180 TABLET | Refills: 1 | Status: SHIPPED | OUTPATIENT
Start: 2023-05-03

## 2023-05-09 ENCOUNTER — OFFICE VISIT (OUTPATIENT)
Dept: CARDIOLOGY CLINIC | Age: 68
End: 2023-05-09
Payer: MEDICARE

## 2023-05-09 VITALS
WEIGHT: 165.4 LBS | HEIGHT: 70 IN | HEART RATE: 48 BPM | DIASTOLIC BLOOD PRESSURE: 92 MMHG | SYSTOLIC BLOOD PRESSURE: 180 MMHG | OXYGEN SATURATION: 96 % | BODY MASS INDEX: 23.68 KG/M2

## 2023-05-09 DIAGNOSIS — I62.9 INTRACRANIAL BLEED (HCC): ICD-10-CM

## 2023-05-09 DIAGNOSIS — I48.91 ATRIAL FIBRILLATION WITH RAPID VENTRICULAR RESPONSE (HCC): Primary | ICD-10-CM

## 2023-05-09 DIAGNOSIS — I10 PRIMARY HYPERTENSION: ICD-10-CM

## 2023-05-09 DIAGNOSIS — I50.20 HFREF (HEART FAILURE WITH REDUCED EJECTION FRACTION) (HCC): ICD-10-CM

## 2023-05-09 DIAGNOSIS — I34.0 MODERATE TO SEVERE MITRAL REGURGITATION: ICD-10-CM

## 2023-05-09 PROCEDURE — 3077F SYST BP >= 140 MM HG: CPT | Performed by: INTERNAL MEDICINE

## 2023-05-09 PROCEDURE — 93000 ELECTROCARDIOGRAM COMPLETE: CPT | Performed by: INTERNAL MEDICINE

## 2023-05-09 PROCEDURE — 99214 OFFICE O/P EST MOD 30 MIN: CPT | Performed by: INTERNAL MEDICINE

## 2023-05-09 PROCEDURE — 1123F ACP DISCUSS/DSCN MKR DOCD: CPT | Performed by: INTERNAL MEDICINE

## 2023-05-09 PROCEDURE — 3080F DIAST BP >= 90 MM HG: CPT | Performed by: INTERNAL MEDICINE

## 2023-05-09 RX ORDER — AMLODIPINE BESYLATE 5 MG/1
5 TABLET ORAL DAILY
Qty: 90 TABLET | Refills: 1 | Status: SHIPPED | OUTPATIENT
Start: 2023-05-09

## 2023-05-12 ENCOUNTER — OFFICE VISIT (OUTPATIENT)
Dept: CARDIOLOGY CLINIC | Age: 68
End: 2023-05-12

## 2023-05-12 VITALS
WEIGHT: 163 LBS | HEIGHT: 70 IN | HEART RATE: 48 BPM | SYSTOLIC BLOOD PRESSURE: 142 MMHG | BODY MASS INDEX: 23.34 KG/M2 | OXYGEN SATURATION: 98 % | DIASTOLIC BLOOD PRESSURE: 80 MMHG

## 2023-05-12 DIAGNOSIS — I25.5 ISCHEMIC CARDIOMYOPATHY: ICD-10-CM

## 2023-05-12 DIAGNOSIS — E78.2 MIXED HYPERLIPIDEMIA: ICD-10-CM

## 2023-05-12 DIAGNOSIS — I50.20 HFREF (HEART FAILURE WITH REDUCED EJECTION FRACTION) (HCC): ICD-10-CM

## 2023-05-12 DIAGNOSIS — I34.0 MITRAL VALVE INSUFFICIENCY, UNSPECIFIED ETIOLOGY: ICD-10-CM

## 2023-05-12 DIAGNOSIS — I48.91 ATRIAL FIBRILLATION WITH RAPID VENTRICULAR RESPONSE (HCC): Primary | ICD-10-CM

## 2023-05-12 DIAGNOSIS — I34.0 MODERATE TO SEVERE MITRAL REGURGITATION: ICD-10-CM

## 2023-05-12 DIAGNOSIS — I62.9 INTRACRANIAL BLEED (HCC): ICD-10-CM

## 2023-05-12 PROBLEM — I48.0 PAROXYSMAL ATRIAL FIBRILLATION (HCC): Status: ACTIVE | Noted: 2023-05-12

## 2023-05-12 PROBLEM — I25.10 CORONARY ARTERY DISEASE INVOLVING NATIVE CORONARY ARTERY OF NATIVE HEART WITHOUT ANGINA PECTORIS: Status: ACTIVE | Noted: 2023-05-12

## 2023-05-15 ENCOUNTER — PROCEDURE VISIT (OUTPATIENT)
Dept: CARDIOLOGY CLINIC | Age: 68
End: 2023-05-15
Payer: MEDICARE

## 2023-05-15 DIAGNOSIS — I34.0 MODERATE TO SEVERE MITRAL REGURGITATION: ICD-10-CM

## 2023-05-15 LAB
LV EF: 50 %
LVEF MODALITY: NORMAL

## 2023-05-15 PROCEDURE — 93306 TTE W/DOPPLER COMPLETE: CPT | Performed by: INTERNAL MEDICINE

## 2023-05-18 ENCOUNTER — TELEPHONE (OUTPATIENT)
Dept: CARDIOTHORACIC SURGERY | Age: 68
End: 2023-05-18

## 2023-05-18 NOTE — TELEPHONE ENCOUNTER
Spoke with patient regarding scheduling an appointment with Dr. Albertina Rosa for his atrial fibrillation. He had an echocardiogram done on 5/15/23 and will be seeing Dr. Jace Fernandez to review the plan for his heart. I told him I would check back with him on about 6/5/23 to see what the plan will be.

## 2023-05-22 ENCOUNTER — TELEPHONE (OUTPATIENT)
Dept: CARDIOLOGY CLINIC | Age: 68
End: 2023-05-22

## 2023-05-22 DIAGNOSIS — I34.0 MODERATE TO SEVERE MITRAL REGURGITATION: Primary | ICD-10-CM

## 2023-05-30 ENCOUNTER — HOSPITAL ENCOUNTER (OUTPATIENT)
Dept: CARDIAC CATH/INVASIVE PROCEDURES | Age: 68
Discharge: HOME | End: 2023-05-30
Attending: INTERNAL MEDICINE | Admitting: INTERNAL MEDICINE
Payer: MEDICARE

## 2023-05-30 VITALS
HEART RATE: 48 BPM | TEMPERATURE: 98.5 F | RESPIRATION RATE: 16 BRPM | SYSTOLIC BLOOD PRESSURE: 165 MMHG | DIASTOLIC BLOOD PRESSURE: 80 MMHG

## 2023-05-30 DIAGNOSIS — I34.0 MODERATE TO SEVERE MITRAL REGURGITATION: ICD-10-CM

## 2023-05-30 LAB
ANION GAP SERPL CALCULATED.3IONS-SCNC: 7 MMOL/L (ref 3–16)
BUN SERPL-MCNC: 18 MG/DL (ref 7–20)
CALCIUM SERPL-MCNC: 9 MG/DL (ref 8.3–10.6)
CHLORIDE SERPL-SCNC: 105 MMOL/L (ref 99–110)
CO2 SERPL-SCNC: 24 MMOL/L (ref 21–32)
CREAT SERPL-MCNC: 0.9 MG/DL (ref 0.8–1.3)
DEPRECATED RDW RBC AUTO: 14.2 % (ref 12.4–15.4)
GFR SERPLBLD CREATININE-BSD FMLA CKD-EPI: >60 ML/MIN/{1.73_M2}
GLUCOSE SERPL-MCNC: 109 MG/DL (ref 70–99)
HCT VFR BLD AUTO: 37.6 % (ref 40.5–52.5)
HGB BLD-MCNC: 12.5 G/DL (ref 13.5–17.5)
LV EF: 55 %
LVEF MODALITY: NORMAL
MCH RBC QN AUTO: 30.3 PG (ref 26–34)
MCHC RBC AUTO-ENTMCNC: 33.3 G/DL (ref 31–36)
MCV RBC AUTO: 91.1 FL (ref 80–100)
NT-PROBNP SERPL-MCNC: 1150 PG/ML (ref 0–124)
PLATELET # BLD AUTO: 194 K/UL (ref 135–450)
PMV BLD AUTO: 9.4 FL (ref 5–10.5)
POTASSIUM SERPL-SCNC: 4.2 MMOL/L (ref 3.5–5.1)
RBC # BLD AUTO: 4.12 M/UL (ref 4.2–5.9)
SODIUM SERPL-SCNC: 136 MMOL/L (ref 136–145)
WBC # BLD AUTO: 6.4 K/UL (ref 4–11)

## 2023-05-30 PROCEDURE — 7100000010 HC PHASE II RECOVERY - FIRST 15 MIN

## 2023-05-30 PROCEDURE — 83880 ASSAY OF NATRIURETIC PEPTIDE: CPT

## 2023-05-30 PROCEDURE — 99152 MOD SED SAME PHYS/QHP 5/>YRS: CPT | Performed by: INTERNAL MEDICINE

## 2023-05-30 PROCEDURE — 93312 ECHO TRANSESOPHAGEAL: CPT

## 2023-05-30 PROCEDURE — 99152 MOD SED SAME PHYS/QHP 5/>YRS: CPT

## 2023-05-30 PROCEDURE — 80048 BASIC METABOLIC PNL TOTAL CA: CPT

## 2023-05-30 PROCEDURE — 36415 COLL VENOUS BLD VENIPUNCTURE: CPT

## 2023-05-30 PROCEDURE — 99153 MOD SED SAME PHYS/QHP EA: CPT

## 2023-05-30 PROCEDURE — 85027 COMPLETE CBC AUTOMATED: CPT

## 2023-05-30 PROCEDURE — 93320 DOPPLER ECHO COMPLETE: CPT

## 2023-05-30 PROCEDURE — 93325 DOPPLER ECHO COLOR FLOW MAPG: CPT

## 2023-05-30 PROCEDURE — 93312 ECHO TRANSESOPHAGEAL: CPT | Performed by: INTERNAL MEDICINE

## 2023-05-30 RX ORDER — SODIUM CHLORIDE 0.9 % (FLUSH) 0.9 %
5-40 SYRINGE (ML) INJECTION PRN
Status: DISCONTINUED | OUTPATIENT
Start: 2023-05-30 | End: 2023-05-30 | Stop reason: HOSPADM

## 2023-05-30 NOTE — OP NOTE
Patient:  Dave Patel   :   1955    Procedural Summary  ~Consent:   Obtained written and verbal consent      Risks/benefits explained in detail  ~Procedure:    RAAMIS  ~Medications:    Procedural sedation with minimal conscious sedation  ~Complications:   None  ~Blood Loss:    <0cc  ~Specimens:    None obtained  ~Pre-sedation re-evaluation: Performed immediately prior to procedure. Medication and Procedural Reconciliation:  An independent trained observer pushed medications at my direction. We monitored the patient's level of consciousness and vital signs/physiologic status throughout the procedure duration (see start and stop times below).   Sedation: 3 mg Versed, 75 mcg Fentanyl  Sedation start: 919  Sedation stop: 952      Impression  ~Moderate functional mitral regurgitation  ~Tiny PFO  ~Grade I Aortic atheroma      Recommendation  ~Recommend optimization of GDMT  ~Discuss with EP about [facility], 2023 9:57 AM

## 2023-05-30 NOTE — H&P
Department of Cardiology Pre-operative History and Physical        DIAGNOSIS:  severe mitral regurgitation    INDICATION:  severe mitral regurgitation    PROCEDURE:  transesophageal echocardiogram    CHIEF COMPLAINT:  shortness of breath     History Obtained From:  patient    HISTORY OF PRESENT ILLNESS:      The patient is a 79 y.o. male with significant past medical history of atrial fibrillation, cad, and heart failure who presents with shortness of breath. He reports that he has dyspnea on exertion. He was found to have severe mitral regurgitation.        Past Medical History:        Diagnosis Date    Headache     Hypertension     Intracerebral hemorrhage ([address] 5/12/2013    Recurrent 8/6/13 - Right occipitoparietal    Neck pain     Unspecified cerebral artery occlusion with cerebral infarction      Past Surgical History:        Procedure Laterality Date    SHOULDER SURGERY       Medications Prior to Admission:   Medications Prior to Admission: sacubitril-valsartan (ENTRESTO) 24-26 MG per tablet, Take 1 tablet by mouth 2 times daily  amLODIPine (NORVASC) 5 MG tablet, Take 1 tablet by mouth daily (Patient taking differently: Take 0.5 tablets by mouth daily)  metoprolol succinate (TOPROL XL) 50 MG extended release tablet, Take 1 tablet by mouth in the morning and at bedtime  amiodarone (CORDARONE) 200 MG tablet, Take 1 tablet by mouth daily 0.5 daily  spironolactone (ALDACTONE) 25 MG tablet, Take 0.5 tablets by mouth daily  pantoprazole (PROTONIX) 20 MG tablet, Take 1 tablet by mouth every morning (before breakfast)  apixaban (ELIQUIS) 5 MG TABS tablet, Take 1 tablet by mouth 2 times daily (Patient not taking: Reported on 5/12/2023)  atorvastatin (LIPITOR) 80 MG tablet, Take 1 tablet by mouth nightly  clopidogrel (PLAVIX) 75 MG tablet, Take 1 tablet by mouth daily  Glucosamine HCl (GLUCOSAMINE PO), Take 1,000 mg by mouth daily  oxyCODONE (OXY-IR) 15 MG immediate release tablet, Take 1 tablet by mouth 3 times daily as

## 2023-05-30 NOTE — DISCHARGE INSTRUCTIONS
ARAMIS DISCHARGE INSTRUCTIONS    No driving for 24 hours. We strongly recommend that a responsible adult stay with you for the next 24 hours. Continue Medications.     Advance diet as tolerated    Contact physician office  for following symptoms:  Fever  Difficulty swallowing  Chest pain  Difficulty breathing  Bleeding

## 2023-05-30 NOTE — SEDATION DOCUMENTATION
Brief Pre-Op Note/Sedation Assessment      Meliton Albrecht  1955  [id number]  9:14 AM    Planned Procedure: ARAMIS  Post Procedure Plan: Return to same level of care  Consent: I have discussed with the patient and/or the patient representative the indication, alternatives, and the possible risks and/or complications of the planned procedure and the anesthesia methods. The patient and/or patient representative appear to understand and agree to proceed. Chief Complaint:   Dyspnea on Exertion  Severe mitral regurgitation    Does Patient need surgery? Cath Valve Surgery:  Yes:  Mitral Regurgitation; Regurgitation Severity: Moderately Severe (3+) Intermediate >= 4 METS with symptoms    Pre-Procedure Medical History:  Vital Signs:  BP (!) 165/80   Pulse (!) 48   Temp 98.5 °F (36.9 °C)   Resp 16     Allergies:  No Known Allergies  Medications:    Current Facility-Administered Medications   Medication Dose Route Frequency Provider Last Rate Last Admin    sodium chloride flush 0.9 % injection 5-40 mL  5-40 mL IntraVENous PRN Murray Justice DO           Past Medical History:    Past Medical History:   Diagnosis Date    Headache     Hypertension     Intracerebral hemorrhage ([address] 5/12/2013    Recurrent 8/6/13 - Right occipitoparietal    Neck pain     Unspecified cerebral artery occlusion with cerebral infarction        Surgical History:    Past Surgical History:   Procedure Laterality Date    SHOULDER SURGERY               Pre-Sedation:  Pre-Sedation Documentation and Exam:  I have personally completed a history, physical exam & review of systems for this patient (see notes). Prior History of Anesthesia Complications:   none    Modified Mallampati:  III (soft palate, base of uvula visible)    ASA Classification:  Class 2 - A normal healthy patient with mild systemic disease    Hayden Scale:   Activity:  2 - Able to move 4 extremities voluntarily on command  Respiration:  2 - Able to breathe deeply and

## 2023-05-31 NOTE — PROGRESS NOTES
[address]  6/2/23  Referring: Dr. Stephany Mercado CONSULT/CHIEF COMPLAINT/HPI     Reason for visit/ Chief complaint  New patient  Evaluation of MR   HPI Sarah Briceno is a 79 y.o. seen for severe MR. He has a history of atrial fibrillation ( diagnosed 3/1/23), CAD,CVA, HFrEF, MR, htn, hyperlipidemia. He had a right parietal parenchymal bleed in 2013     He is , he and his wife have 5 kids between them. He no longer smokes, did smoke many years ago. Used to drink alcohol a long time ago. Prior cardiac stenting he had shortness of breath. Since he has been able to walk up and down a flight of stairs without stopping, no shortness of breath. Last visit lisinopril was d/connie, he was started on entresto   He had a ARAMIS on 5/30 that showed moderate functional MR, tiny PFO grade 1 aaortic atheroma    Today, he states he is feeling well. He is able to go up and down a flight of stairs, carrying a basket of laundry without chest pain, shortness of breath palpitations or dizziness. He was able to walk in from the parking lot without stopping. No edema or orthopnea  He reports his blood pressure is a little elevated in the morning, but is lower at evening ( 120/80)    Patient is adherent with medications and is tolerating them well without side effects     HISTORY/ALLERGIES/ROS     MedHx:  has a past medical history of Headache, Hypertension, Intracerebral hemorrhage ([address] Neck pain, and Unspecified cerebral artery occlusion with cerebral infarction. SurgHx:  has a past surgical history that includes shoulder surgery. SocHx:  reports that he has never smoked. He has never used smokeless tobacco. He reports that he does not currently use alcohol. He reports that he does not use drugs. FamHx: No family history of premature coronary artery disease, sudden death, or aneurysm  Allergies: Patient has no known allergies. ROS:   Review of Systems   Constitutional:  Positive for fatigue.

## 2023-06-02 ENCOUNTER — OFFICE VISIT (OUTPATIENT)
Dept: CARDIOLOGY CLINIC | Age: 68
End: 2023-06-02

## 2023-06-02 ENCOUNTER — TELEPHONE (OUTPATIENT)
Dept: CARDIOLOGY CLINIC | Age: 68
End: 2023-06-02

## 2023-06-02 VITALS
BODY MASS INDEX: 23.48 KG/M2 | HEIGHT: 70 IN | WEIGHT: 164 LBS | SYSTOLIC BLOOD PRESSURE: 140 MMHG | HEART RATE: 48 BPM | DIASTOLIC BLOOD PRESSURE: 68 MMHG | OXYGEN SATURATION: 98 %

## 2023-06-02 DIAGNOSIS — I48.91 ATRIAL FIBRILLATION WITH RAPID VENTRICULAR RESPONSE (HCC): ICD-10-CM

## 2023-06-02 DIAGNOSIS — I50.20 HFREF (HEART FAILURE WITH REDUCED EJECTION FRACTION) (HCC): ICD-10-CM

## 2023-06-02 DIAGNOSIS — I34.0 MITRAL VALVE INSUFFICIENCY, UNSPECIFIED ETIOLOGY: ICD-10-CM

## 2023-06-02 DIAGNOSIS — I62.9 INTRACRANIAL BLEED (HCC): ICD-10-CM

## 2023-06-02 DIAGNOSIS — I48.0 PAROXYSMAL ATRIAL FIBRILLATION (HCC): ICD-10-CM

## 2023-06-02 DIAGNOSIS — I25.10 CORONARY ARTERY DISEASE INVOLVING NATIVE CORONARY ARTERY OF NATIVE HEART WITHOUT ANGINA PECTORIS: ICD-10-CM

## 2023-06-02 DIAGNOSIS — I25.5 ISCHEMIC CARDIOMYOPATHY: ICD-10-CM

## 2023-06-02 DIAGNOSIS — E78.2 MIXED HYPERLIPIDEMIA: ICD-10-CM

## 2023-06-02 DIAGNOSIS — I34.0 MODERATE TO SEVERE MITRAL REGURGITATION: Primary | ICD-10-CM

## 2023-06-02 RX ORDER — AMLODIPINE BESYLATE 5 MG/1
2.5 TABLET ORAL DAILY
Qty: 90 TABLET | Refills: 3 | Status: SHIPPED | OUTPATIENT
Start: 2023-06-02

## 2023-06-02 RX ORDER — METOPROLOL SUCCINATE 50 MG/1
50 TABLET, EXTENDED RELEASE ORAL 2 TIMES DAILY
Qty: 180 TABLET | Refills: 3 | Status: SHIPPED | OUTPATIENT
Start: 2023-06-02 | End: 2023-06-26 | Stop reason: SDUPTHER

## 2023-06-02 RX ORDER — METOPROLOL SUCCINATE 50 MG/1
50 TABLET, EXTENDED RELEASE ORAL 2 TIMES DAILY
Qty: 180 TABLET | Refills: 3 | Status: SHIPPED | OUTPATIENT
Start: 2023-06-02 | End: 2023-06-02 | Stop reason: SDUPTHER

## 2023-06-02 RX ORDER — ATORVASTATIN CALCIUM 80 MG/1
80 TABLET, FILM COATED ORAL NIGHTLY
Qty: 90 TABLET | Refills: 3 | Status: SHIPPED | OUTPATIENT
Start: 2023-06-02

## 2023-06-02 RX ORDER — SPIRONOLACTONE 25 MG/1
12.5 TABLET ORAL DAILY
Qty: 90 TABLET | Refills: 3 | Status: SHIPPED | OUTPATIENT
Start: 2023-06-02

## 2023-06-02 RX ORDER — ASPIRIN 81 MG/1
81 TABLET ORAL DAILY
Qty: 90 TABLET | Refills: 1 | Status: SHIPPED | OUTPATIENT
Start: 2023-06-02

## 2023-06-02 RX ORDER — CLOPIDOGREL BISULFATE 75 MG/1
75 TABLET ORAL DAILY
Qty: 90 TABLET | Refills: 3 | Status: SHIPPED | OUTPATIENT
Start: 2023-06-02

## 2023-06-02 NOTE — PATIENT INSTRUCTIONS
Start asa 81 mg daily  Increase entresto to 1 1/2 tabs twice a day  Continue all medications  Call for any changes

## 2023-06-02 NOTE — TELEPHONE ENCOUNTER
Last OV:03/16/2023  Peña Mata  Last Labs: Ekg-05/09/2023  Naeem  Next OV:08/30/2023  Naeem  Last Refill: Metoprolol- 05/03/23  Peña Mata

## 2023-06-04 ASSESSMENT — ENCOUNTER SYMPTOMS
SHORTNESS OF BREATH: 0
SHORTNESS OF BREATH: 1

## 2023-06-05 RX ORDER — AMIODARONE HYDROCHLORIDE 200 MG/1
100 TABLET ORAL DAILY
Qty: 30 TABLET | Refills: 3 | Status: SHIPPED | OUTPATIENT
Start: 2023-06-05

## 2023-06-05 RX ORDER — CLOPIDOGREL BISULFATE 75 MG/1
75 TABLET ORAL DAILY
Qty: 90 TABLET | Refills: 3 | OUTPATIENT
Start: 2023-06-05

## 2023-06-05 RX ORDER — LISINOPRIL 2.5 MG/1
TABLET ORAL
Qty: 90 TABLET | Refills: 3 | OUTPATIENT
Start: 2023-06-05

## 2023-06-05 NOTE — TELEPHONE ENCOUNTER
I spoke with wife . We went over the med list. Lisinopril was dc'd, Amlodipine was increased to 5 mg. All script were refilled 6/2/23 except for the Amio.     Last OV: 6/2/  Last labs: 3/2/23  Appt scheduled :7/25/23

## 2023-06-06 ENCOUNTER — TELEPHONE (OUTPATIENT)
Dept: CARDIOTHORACIC SURGERY | Age: 68
End: 2023-06-06

## 2023-06-06 NOTE — TELEPHONE ENCOUNTER
Spoke with patient regarding scheduling an appointment with Dr. Vazquez Barroso for atrial fibrillation surgery. He is a candidate for the Watchman procedure and will be scheduled for that in the near future with cardiology. I told him if he needs to see a surgeon to please contact our office and we would be happy to see him.

## 2023-06-08 ENCOUNTER — TELEPHONE (OUTPATIENT)
Dept: CARDIOLOGY CLINIC | Age: 68
End: 2023-06-08

## 2023-06-08 NOTE — TELEPHONE ENCOUNTER
Pt states RMM has recommended watchman. Pt states he has seen DKW and is asking for a call back to advise what the next step is. Please call to advise.

## 2023-06-08 NOTE — TELEPHONE ENCOUNTER
Discussed with patient that The Watchman coordinator will  call him for next steps. Message sent to Doug Herring.

## 2023-06-14 ENCOUNTER — TELEPHONE (OUTPATIENT)
Dept: CARDIOLOGY CLINIC | Age: 68
End: 2023-06-14

## 2023-06-14 DIAGNOSIS — Z95.818 PRESENCE OF WATCHMAN LEFT ATRIAL APPENDAGE CLOSURE DEVICE: ICD-10-CM

## 2023-06-14 DIAGNOSIS — I48.0 PAROXYSMAL A-FIB (HCC): Primary | ICD-10-CM

## 2023-06-14 DIAGNOSIS — Z01.818 PRE-OP TESTING: ICD-10-CM

## 2023-06-19 RX ORDER — APIXABAN 5 MG/1
TABLET, FILM COATED ORAL
Qty: 200 TABLET | Refills: 2 | OUTPATIENT
Start: 2023-06-19

## 2023-06-19 RX ORDER — FUROSEMIDE 40 MG/1
TABLET ORAL
Qty: 100 TABLET | Refills: 2 | OUTPATIENT
Start: 2023-06-19

## 2023-06-19 RX ORDER — SPIRONOLACTONE 25 MG/1
TABLET ORAL
Qty: 100 TABLET | Refills: 2 | OUTPATIENT
Start: 2023-06-19

## 2023-06-19 NOTE — TELEPHONE ENCOUNTER
Antoine Raymond order changed by Lobo Littlejohn held per RMM last ov with DKW, Lasix not on pt's rx list.

## 2023-06-22 NOTE — TELEPHONE ENCOUNTER
Spoke with patient, informed him of the procedure that is being scheduled on 8/14/2023 at 9:30 AM arrival.  Lab work has been ordered and instructed to have done at [facility] on 8/7/2023. Also informed to get Hibiclens bath at their Pharmacy. All procedure instructions were mailed to patient. Instructed to contact me with any questions prior to procedure.

## 2023-06-26 RX ORDER — AMIODARONE HYDROCHLORIDE 200 MG/1
100 TABLET ORAL DAILY
Qty: 45 TABLET | Refills: 0 | Status: SHIPPED | OUTPATIENT
Start: 2023-06-26

## 2023-06-26 RX ORDER — METOPROLOL SUCCINATE 50 MG/1
50 TABLET, EXTENDED RELEASE ORAL 2 TIMES DAILY
Qty: 180 TABLET | Refills: 3 | Status: SHIPPED | OUTPATIENT
Start: 2023-06-26

## 2023-06-27 ENCOUNTER — TELEPHONE (OUTPATIENT)
Dept: CARDIOLOGY CLINIC | Age: 68
End: 2023-06-27

## 2023-06-27 RX ORDER — CHLORHEXIDINE GLUCONATE 213 G/1000ML
SOLUTION TOPICAL
Qty: 1 EACH | Refills: 0 | Status: SHIPPED | OUTPATIENT
Start: 2023-06-27 | End: 2023-08-14 | Stop reason: HOSPADM

## 2023-06-27 NOTE — TELEPHONE ENCOUNTER
Rocio Osman 5/9/2023 referred for Watchman procedure.   Dr. Any Manuel consulted on 5/9/2023   shared decision on 6/2/2023  ARAMIS done on 5/30/2023  Insurance to be approved per [facility]

## 2023-06-28 RX ORDER — ASPIRIN 81 MG/1
81 TABLET ORAL DAILY
Qty: 90 TABLET | Refills: 3 | Status: SHIPPED | OUTPATIENT
Start: 2023-06-28

## 2023-07-03 ENCOUNTER — TELEPHONE (OUTPATIENT)
Dept: CARDIOLOGY CLINIC | Age: 68
End: 2023-07-03

## 2023-07-06 ENCOUNTER — TELEPHONE (OUTPATIENT)
Dept: CARDIOLOGY CLINIC | Age: 68
End: 2023-07-06

## 2023-07-07 ENCOUNTER — TELEPHONE (OUTPATIENT)
Dept: CARDIOLOGY CLINIC | Age: 68
End: 2023-07-07

## 2023-07-12 ENCOUNTER — TELEPHONE (OUTPATIENT)
Dept: CARDIOLOGY CLINIC | Age: 68
End: 2023-07-12

## 2023-07-12 RX ORDER — AMIODARONE HYDROCHLORIDE 200 MG/1
100 TABLET ORAL DAILY
Qty: 15 TABLET | Refills: 0 | Status: SHIPPED | OUTPATIENT
Start: 2023-07-12

## 2023-07-12 NOTE — TELEPHONE ENCOUNTER
Pt will need samples until forms get approved       Sample requested:   sacubitril-valsartan (ENTRESTO) 24-26 MG per tablet  Strength:   1.5 tablet  Dosage:    Take 1.5 tablets by mouth 2 times daily  Patient's call back number:   [phone]

## 2023-07-12 NOTE — TELEPHONE ENCOUNTER
Spoke with [facility] -  will be released 07/17/2023  . Please call patient and make sure he has enough medication to get him through.

## 2023-07-12 NOTE — TELEPHONE ENCOUNTER
Pt called stating the pharmacy will not release the RX until office calls about the RX for the amiodarone (CORDARONE) 200 MG tablet       [facility] ([facility] ) - [address] [phone] [place] [phone]   [address] [facility] [place]   Phone:  [phone]  Fax:  [phone]

## 2023-07-12 NOTE — TELEPHONE ENCOUNTER
I spoke with pt and relayed message per [facility]. Pt says that he is out of the Amio. He also said that he paid for the [facility] and never got that either. I called [facility] to see what we can do. She said to send a temporary supply to the local pharmacy and ask for an over ride. She also stated that pt never called to get the Entresto filled. If pt calls back tell him he needs to call [facility] to get the [facility].

## 2023-07-13 RX ORDER — AMIODARONE HYDROCHLORIDE 200 MG/1
TABLET ORAL
Qty: 45 TABLET | OUTPATIENT
Start: 2023-07-13

## 2023-07-13 NOTE — TELEPHONE ENCOUNTER
RX signed and all information faxed to number listed on the form and copy placed in scan bin to be scanned.

## 2023-07-18 ENCOUNTER — TELEPHONE (OUTPATIENT)
Dept: CARDIOLOGY CLINIC | Age: 68
End: 2023-07-18

## 2023-07-18 NOTE — TELEPHONE ENCOUNTER
Pt called stating they would like for Terry Ellis to call them back they have a lot of questions they would like to ask, about what the next step is.     Call back number is [phone]

## 2023-07-24 NOTE — TELEPHONE ENCOUNTER
Called patient   1. he has been out of protonix for an unknown period of time. Has heartburn after eating spicy meals, and especially red sauces, This is never exertional, is the same indigestion pain he has had and prilosec has always relieved it  2. He has extra phlegm in the back of his throat. This never wakes him up at night, no associated shortness of breath, fever or chills, no orthopnea  3. This weekend had stomach pain that started sat am.today took [address] and it resolved     Patient states outside of this he is feeling great, just does not want to take any medications without dkw knowing.   He has an appt tomorrow at 930, we can address then

## 2023-07-25 ENCOUNTER — TELEPHONE (OUTPATIENT)
Dept: CARDIOLOGY CLINIC | Age: 68
End: 2023-07-25

## 2023-07-25 ENCOUNTER — OFFICE VISIT (OUTPATIENT)
Dept: CARDIOLOGY CLINIC | Age: 68
End: 2023-07-25

## 2023-07-25 VITALS
HEART RATE: 57 BPM | HEIGHT: 72 IN | WEIGHT: 165.6 LBS | DIASTOLIC BLOOD PRESSURE: 64 MMHG | BODY MASS INDEX: 22.43 KG/M2 | OXYGEN SATURATION: 98 % | SYSTOLIC BLOOD PRESSURE: 140 MMHG

## 2023-07-25 DIAGNOSIS — I48.91 ATRIAL FIBRILLATION WITH RAPID VENTRICULAR RESPONSE (HCC): Primary | ICD-10-CM

## 2023-07-25 DIAGNOSIS — I62.9 INTRACRANIAL BLEED (HCC): ICD-10-CM

## 2023-07-25 DIAGNOSIS — I48.0 PAROXYSMAL A-FIB (HCC): ICD-10-CM

## 2023-07-25 DIAGNOSIS — I34.0 MODERATE TO SEVERE MITRAL REGURGITATION: ICD-10-CM

## 2023-07-25 DIAGNOSIS — I25.5 ISCHEMIC CARDIOMYOPATHY: ICD-10-CM

## 2023-07-25 DIAGNOSIS — I50.20 HFREF (HEART FAILURE WITH REDUCED EJECTION FRACTION) (HCC): ICD-10-CM

## 2023-07-25 DIAGNOSIS — I50.21 ACUTE SYSTOLIC HEART FAILURE (HCC): ICD-10-CM

## 2023-07-25 DIAGNOSIS — E78.2 MIXED HYPERLIPIDEMIA: ICD-10-CM

## 2023-07-25 DIAGNOSIS — I25.10 CORONARY ARTERY DISEASE INVOLVING NATIVE CORONARY ARTERY OF NATIVE HEART WITHOUT ANGINA PECTORIS: ICD-10-CM

## 2023-07-25 DIAGNOSIS — I48.0 PAROXYSMAL ATRIAL FIBRILLATION (HCC): ICD-10-CM

## 2023-07-25 RX ORDER — FLUTICASONE PROPIONATE 50 MCG
1 SPRAY, SUSPENSION (ML) NASAL 2 TIMES DAILY
Qty: 32 G | Refills: 1 | Status: SHIPPED | OUTPATIENT
Start: 2023-07-25 | End: 2023-07-25

## 2023-07-25 RX ORDER — AMLODIPINE BESYLATE 5 MG/1
2.5 TABLET ORAL DAILY
Qty: 90 TABLET | Refills: 3 | Status: CANCELLED | OUTPATIENT
Start: 2023-07-25

## 2023-07-25 RX ORDER — ECHINACEA PURPUREA EXTRACT 125 MG
1 TABLET ORAL 2 TIMES DAILY
Qty: 1 EACH | Refills: 3 | Status: SHIPPED | OUTPATIENT
Start: 2023-07-25

## 2023-07-25 RX ORDER — ATORVASTATIN CALCIUM 80 MG/1
80 TABLET, FILM COATED ORAL NIGHTLY
Qty: 90 TABLET | Refills: 3 | Status: SHIPPED | OUTPATIENT
Start: 2023-07-25

## 2023-07-25 RX ORDER — FLUTICASONE PROPIONATE 50 MCG
SPRAY, SUSPENSION (ML) NASAL
Qty: 48 G | OUTPATIENT
Start: 2023-07-25

## 2023-07-25 RX ORDER — METOPROLOL SUCCINATE 50 MG/1
50 TABLET, EXTENDED RELEASE ORAL 2 TIMES DAILY
Qty: 180 TABLET | Refills: 3 | Status: SHIPPED | OUTPATIENT
Start: 2023-07-25

## 2023-07-25 RX ORDER — CLOPIDOGREL BISULFATE 75 MG/1
75 TABLET ORAL DAILY
Qty: 90 TABLET | Refills: 3 | Status: SHIPPED | OUTPATIENT
Start: 2023-07-25

## 2023-07-25 RX ORDER — SPIRONOLACTONE 25 MG/1
12.5 TABLET ORAL DAILY
Qty: 90 TABLET | Refills: 3 | Status: SHIPPED | OUTPATIENT
Start: 2023-07-25

## 2023-07-25 RX ORDER — PANTOPRAZOLE SODIUM 20 MG/1
20 TABLET, DELAYED RELEASE ORAL
Qty: 90 TABLET | Refills: 0 | Status: SHIPPED | OUTPATIENT
Start: 2023-07-25

## 2023-07-25 ASSESSMENT — ENCOUNTER SYMPTOMS: SHORTNESS OF BREATH: 0

## 2023-07-25 NOTE — TELEPHONE ENCOUNTER
Dr Jelly Gallo would like to know if patient could see Dr Lubna Funez sooner than aug 30, he is currently off of eliquis.  Can you please evaluate, thanks

## 2023-07-25 NOTE — PATIENT INSTRUCTIONS
1.increase entresto to 49/51 mg twice a day ( can take 2 of the current dose twice a day 24/26 mg)  2.stop norvasc  3.start jardiance  10 mg daily ( please call company to see if qualify for assistance)  4. fasting labs   5.start ocean spray for nasal congestion- 1 spray each nostril twice a day  6. Camille Arceo will contact you regarding financial assistance  7.follow up in 2 months      So, you are taking Jardiance (Empagliflozin)? Here are some tips on lessening the cost of medications:    Commercial Insurance:  $10 COPAY CARD  Your actual cost or co-pay will depend on your insurance plan's drug   coverage. Each plan is different and will determine your out-of-pocket cost.   On average, 88%, or 9 out of 10 patients, will pay between $0-$50 per   month for Abigail Covert, and the remaining 12% will pay an average of $232   per month. 1  With the [employer], you could pay as little as $10 per month. Medicare Part D Coverage: For patients with Medicare Part D coverage, about 59%, or 5.9 out of 10   patients will pay between $0-$50 a month for Abigail Covert, with the   remaining 41% averaging about $169 a month.1 You should know that your   out-of-pocket costs could vary throughout the year depending on which   phase of the Part D benefit (eg,\"donut hole\") you are currently in. For patients with Medicare Part D who also qualify for the Extra Help   program2 the average cost is between $4 and $10 a QZKFZ.0,2    Application: [employer].  Call [phone] to have application mailed to you. Always remember-shop around as pharmacies can differ quite a lot on cost.    [employer] com  [employer]. com  Additional Affordability Resources  Other Resources   Here are some additional resources that may help you gain access to the medicines or services you need.  This is not a complete list and is provided as a public service for health care

## 2023-07-25 NOTE — PROGRESS NOTES
in AF ( severe MR),would like to reassess MR while in NSR  -5/30/23- ARAMIS moderate functional mitral regurgitation, tiny PFO  Plan:  -continue medical therapy. I do not think he needs a surgical repair at this time. Feels good. Follow up echo in 1 year    2.atrial fibrillation/flutter  - 3/1/23 diagnoses  - 3/7/23 failed DCCV  4/6/23- successful DCCV  -[id number] Score of at least 6 (CVA2, HTN, Age, CAD, CHF),   -history of intracranial hemorrhage with high risk for long term AC  Plan:  - hold eliquis for now ( per Dr Marychuy Mock)  - proceed with implantation of watchman  - continue amiodarone 200  mg daily for rhythm management  - toprol 50 mg bid for rate control    3. Permanent non-valvular atrial fibrillation  (watchman second opinion)  - [id number] score: 6 (high risk of stroke)  - HASBLED 3 (high risk of bleeding)  - Recommendation for consideration of watchman per Dr. Cheikh Tyson  - Patient is at high risk of stroke and high risk for bleeding with anticoagulation. They are a suitable candidate for short term anticoagulation, but a poor long term candidate for anticoagulation given history of intracranial hemorrhage. I discussed the risks, benefits, and alternatives of the watchman procedure as a non-pharmacologic treatment option for stroke reduction with the patient. The patient's values and preferences were examined. The patient was given the opportunity ask questions and demonstrated insight. - We compared the risks and benefits of the procedure with anticoagulants vs no treatment  - ACC Afib shared decision making tool was utilized to engage in discussion about the watchman procedure  - Danna Moon is an appropriate candidate for watchman procedure in my opinion     4. CAD  - 3/3/23 WILFREDO to critical RCA lesion  - 3/6/23 PCI to RCA stent with balloon angioplasty, WILFREDO to OM1 OM2 LAD  -stable  Plan  - continue plavix  - lipitor daily  - aspirin 81 mg daily given pci  -refer to cardiac rehab    5.  Cardiomyopathy  - ef

## 2023-07-26 NOTE — TELEPHONE ENCOUNTER
RMM called and spoke with the patient directly. Will proceed with Watchman .  No need for sooner appt, may place on cancellation list

## 2023-07-26 NOTE — TELEPHONE ENCOUNTER
Did not see any open slots for DR. Hartley until October. Sent to EP to see if they can fit him in anywhere.

## 2023-07-26 NOTE — TELEPHONE ENCOUNTER
Spoke with patient regarding up coming [address]. Patient is aware and has no questions regarding the Watchman. Patient set up to come in on 8/14/2023 for procedure.

## 2023-07-28 ENCOUNTER — TELEPHONE (OUTPATIENT)
Dept: CARDIOLOGY CLINIC | Age: 68
End: 2023-07-28

## 2023-07-28 NOTE — TELEPHONE ENCOUNTER
Called pt to clarify pharmacy for [facility]. He stated [facility] was fine but he is getting that medication from the .

## 2023-07-31 ENCOUNTER — TELEPHONE (OUTPATIENT)
Dept: CARDIOLOGY CLINIC | Age: 68
End: 2023-07-31

## 2023-07-31 RX ORDER — SPIRONOLACTONE 25 MG/1
TABLET ORAL
Qty: 50 TABLET | Refills: 6 | OUTPATIENT
Start: 2023-07-31

## 2023-07-31 NOTE — TELEPHONE ENCOUNTER
Pt called stating DKW wanted the appt with RMM 8/30 moved up pt does not know why it needed to be moved up, however the pt is scheduled for watchman 8/14. Pt stated they don't know what to do however they don't want miss appt either. Pls advise what the pt needs to do?

## 2023-08-08 ENCOUNTER — HOSPITAL ENCOUNTER (OUTPATIENT)
Age: 68
Discharge: HOME | End: 2023-08-08
Payer: MEDICARE

## 2023-08-08 DIAGNOSIS — I48.0 PAROXYSMAL A-FIB (HCC): ICD-10-CM

## 2023-08-08 DIAGNOSIS — Z01.818 PRE-OP TESTING: ICD-10-CM

## 2023-08-08 LAB
ABO + RH BLD: NORMAL
ALBUMIN SERPL-MCNC: 3.9 G/DL (ref 3.4–5)
ANION GAP SERPL CALCULATED.3IONS-SCNC: 13 MMOL/L (ref 3–16)
BACTERIA URNS QL MICRO: ABNORMAL /HPF
BILIRUB UR QL STRIP.AUTO: NEGATIVE
BLD GP AB SCN SERPL QL: NORMAL
BUN SERPL-MCNC: 16 MG/DL (ref 7–20)
CALCIUM SERPL-MCNC: 9.2 MG/DL (ref 8.3–10.6)
CHLORIDE SERPL-SCNC: 105 MMOL/L (ref 99–110)
CHOLEST SERPL-MCNC: 182 MG/DL (ref 0–199)
CLARITY UR: CLEAR
CO2 SERPL-SCNC: 23 MMOL/L (ref 21–32)
COLOR UR: YELLOW
CREAT SERPL-MCNC: 1.5 MG/DL (ref 0.8–1.3)
DEPRECATED RDW RBC AUTO: 14 % (ref 12.4–15.4)
EPI CELLS #/AREA URNS HPF: ABNORMAL /HPF (ref 0–5)
GFR SERPLBLD CREATININE-BSD FMLA CKD-EPI: 50 ML/MIN/{1.73_M2}
GLUCOSE SERPL-MCNC: 96 MG/DL (ref 70–99)
GLUCOSE UR STRIP.AUTO-MCNC: >=1000 MG/DL
HCT VFR BLD AUTO: 37.4 % (ref 40.5–52.5)
HDLC SERPL-MCNC: 49 MG/DL (ref 40–60)
HGB BLD-MCNC: 13.2 G/DL (ref 13.5–17.5)
HGB UR QL STRIP.AUTO: NEGATIVE
KETONES UR STRIP.AUTO-MCNC: ABNORMAL MG/DL
LDLC SERPL CALC-MCNC: 108 MG/DL
LEUKOCYTE ESTERASE UR QL STRIP.AUTO: NEGATIVE
MCH RBC QN AUTO: 32.2 PG (ref 26–34)
MCHC RBC AUTO-ENTMCNC: 35.4 G/DL (ref 31–36)
MCV RBC AUTO: 91 FL (ref 80–100)
NITRITE UR QL STRIP.AUTO: NEGATIVE
PH UR STRIP.AUTO: 5 [PH] (ref 5–8)
PHOSPHATE SERPL-MCNC: 3.2 MG/DL (ref 2.5–4.9)
PLATELET # BLD AUTO: 190 K/UL (ref 135–450)
PMV BLD AUTO: 9.8 FL (ref 5–10.5)
POTASSIUM SERPL-SCNC: 3.7 MMOL/L (ref 3.5–5.1)
PROT UR STRIP.AUTO-MCNC: ABNORMAL MG/DL
RBC # BLD AUTO: 4.11 M/UL (ref 4.2–5.9)
SODIUM SERPL-SCNC: 141 MMOL/L (ref 136–145)
SP GR UR STRIP.AUTO: 1.02 (ref 1–1.03)
TRIGL SERPL-MCNC: 126 MG/DL (ref 0–150)
UA DIPSTICK W REFLEX MICRO PNL UR: YES
URN SPEC COLLECT METH UR: ABNORMAL
UROBILINOGEN UR STRIP-ACNC: 1 E.U./DL
VLDLC SERPL CALC-MCNC: 25 MG/DL
WBC # BLD AUTO: 8.8 K/UL (ref 4–11)
WBC #/AREA URNS HPF: ABNORMAL /HPF (ref 0–5)

## 2023-08-08 PROCEDURE — 80069 RENAL FUNCTION PANEL: CPT

## 2023-08-08 PROCEDURE — 36415 COLL VENOUS BLD VENIPUNCTURE: CPT

## 2023-08-08 PROCEDURE — 86901 BLOOD TYPING SEROLOGIC RH(D): CPT

## 2023-08-08 PROCEDURE — 80061 LIPID PANEL: CPT

## 2023-08-08 PROCEDURE — 86900 BLOOD TYPING SEROLOGIC ABO: CPT

## 2023-08-08 PROCEDURE — 85027 COMPLETE CBC AUTOMATED: CPT

## 2023-08-08 PROCEDURE — 81001 URINALYSIS AUTO W/SCOPE: CPT

## 2023-08-08 PROCEDURE — 86850 RBC ANTIBODY SCREEN: CPT

## 2023-08-11 DIAGNOSIS — I50.20 HFREF (HEART FAILURE WITH REDUCED EJECTION FRACTION) (HCC): Primary | ICD-10-CM

## 2023-08-14 ENCOUNTER — HOSPITAL ENCOUNTER (INPATIENT)
Dept: CARDIAC CATH/INVASIVE PROCEDURES | Age: 68
LOS: 1 days | Discharge: HOME | DRG: 274 | End: 2023-08-14
Attending: INTERNAL MEDICINE | Admitting: INTERNAL MEDICINE
Payer: MEDICARE

## 2023-08-14 ENCOUNTER — TELEPHONE (OUTPATIENT)
Dept: CARDIOLOGY CLINIC | Age: 68
End: 2023-08-14

## 2023-08-14 ENCOUNTER — ANESTHESIA (OUTPATIENT)
Dept: CARDIAC CATH/INVASIVE PROCEDURES | Age: 68
DRG: 274 | End: 2023-08-14
Payer: MEDICARE

## 2023-08-14 ENCOUNTER — ANESTHESIA EVENT (OUTPATIENT)
Dept: CARDIAC CATH/INVASIVE PROCEDURES | Age: 68
DRG: 274 | End: 2023-08-14
Payer: MEDICARE

## 2023-08-14 VITALS
OXYGEN SATURATION: 96 % | RESPIRATION RATE: 14 BRPM | HEART RATE: 59 BPM | TEMPERATURE: 98 F | DIASTOLIC BLOOD PRESSURE: 76 MMHG | BODY MASS INDEX: 22.08 KG/M2 | HEIGHT: 72 IN | WEIGHT: 163 LBS | SYSTOLIC BLOOD PRESSURE: 149 MMHG

## 2023-08-14 PROBLEM — I48.0 PAF (PAROXYSMAL ATRIAL FIBRILLATION) (HCC): Status: ACTIVE | Noted: 2023-08-14

## 2023-08-14 LAB
ABO + RH BLD: NORMAL
BLD GP AB SCN SERPL QL: NORMAL
EKG ATRIAL RATE: 45 BPM
EKG ATRIAL RATE: 57 BPM
EKG DIAGNOSIS: NORMAL
EKG DIAGNOSIS: NORMAL
EKG P AXIS: 58 DEGREES
EKG P AXIS: 68 DEGREES
EKG P-R INTERVAL: 178 MS
EKG P-R INTERVAL: 184 MS
EKG Q-T INTERVAL: 514 MS
EKG Q-T INTERVAL: 520 MS
EKG QRS DURATION: 132 MS
EKG QRS DURATION: 134 MS
EKG QTC CALCULATION (BAZETT): 449 MS
EKG QTC CALCULATION (BAZETT): 500 MS
EKG R AXIS: -18 DEGREES
EKG R AXIS: -23 DEGREES
EKG T AXIS: 15 DEGREES
EKG T AXIS: 46 DEGREES
EKG VENTRICULAR RATE: 45 BPM
EKG VENTRICULAR RATE: 57 BPM
GLUCOSE BLD-MCNC: 91 MG/DL (ref 70–99)
LV EF: 50 %
LVEF MODALITY: NORMAL
PERFORMED ON: NORMAL
POC ACT LR: 312 SEC

## 2023-08-14 PROCEDURE — 3700000000 HC ANESTHESIA ATTENDED CARE

## 2023-08-14 PROCEDURE — 2580000003 HC RX 258

## 2023-08-14 PROCEDURE — C9399 UNCLASSIFIED DRUGS OR BIOLOG: HCPCS | Performed by: NURSE ANESTHETIST, CERTIFIED REGISTERED

## 2023-08-14 PROCEDURE — 36415 COLL VENOUS BLD VENIPUNCTURE: CPT

## 2023-08-14 PROCEDURE — 86850 RBC ANTIBODY SCREEN: CPT

## 2023-08-14 PROCEDURE — 7100000001 HC PACU RECOVERY - ADDTL 15 MIN

## 2023-08-14 PROCEDURE — 7100000000 HC PACU RECOVERY - FIRST 15 MIN

## 2023-08-14 PROCEDURE — 2709999900 HC NON-CHARGEABLE SUPPLY

## 2023-08-14 PROCEDURE — C9399 UNCLASSIFIED DRUGS OR BIOLOG: HCPCS

## 2023-08-14 PROCEDURE — 93355 ECHO TRANSESOPHAGEAL (TEE): CPT

## 2023-08-14 PROCEDURE — B24BZZ4 ULTRASONOGRAPHY OF HEART WITH AORTA, TRANSESOPHAGEAL: ICD-10-PCS | Performed by: INTERNAL MEDICINE

## 2023-08-14 PROCEDURE — 2500000003 HC RX 250 WO HCPCS

## 2023-08-14 PROCEDURE — 33340 PERQ CLSR TCAT L ATR APNDGE: CPT

## 2023-08-14 PROCEDURE — 2580000003 HC RX 258: Performed by: INTERNAL MEDICINE

## 2023-08-14 PROCEDURE — 93005 ELECTROCARDIOGRAM TRACING: CPT | Performed by: INTERNAL MEDICINE

## 2023-08-14 PROCEDURE — 93308 TTE F-UP OR LMTD: CPT

## 2023-08-14 PROCEDURE — 86900 BLOOD TYPING SEROLOGIC ABO: CPT

## 2023-08-14 PROCEDURE — 86901 BLOOD TYPING SEROLOGIC RH(D): CPT

## 2023-08-14 PROCEDURE — C1769 GUIDE WIRE: HCPCS

## 2023-08-14 PROCEDURE — APPNB45 APP NON BILLABLE 31-45 MINUTES: Performed by: NURSE PRACTITIONER

## 2023-08-14 PROCEDURE — 2500000003 HC RX 250 WO HCPCS: Performed by: NURSE ANESTHETIST, CERTIFIED REGISTERED

## 2023-08-14 PROCEDURE — 6360000002 HC RX W HCPCS: Performed by: NURSE ANESTHETIST, CERTIFIED REGISTERED

## 2023-08-14 PROCEDURE — 93005 ELECTROCARDIOGRAM TRACING: CPT | Performed by: NURSE PRACTITIONER

## 2023-08-14 PROCEDURE — 93010 ELECTROCARDIOGRAM REPORT: CPT | Performed by: INTERNAL MEDICINE

## 2023-08-14 PROCEDURE — 7100000010 HC PHASE II RECOVERY - FIRST 15 MIN

## 2023-08-14 PROCEDURE — 2580000003 HC RX 258: Performed by: NURSE ANESTHETIST, CERTIFIED REGISTERED

## 2023-08-14 PROCEDURE — 02L73DK OCCLUSION OF LEFT ATRIAL APPENDAGE WITH INTRALUMINAL DEVICE, PERCUTANEOUS APPROACH: ICD-10-PCS | Performed by: INTERNAL MEDICINE

## 2023-08-14 PROCEDURE — 3700000001 HC ADD 15 MINUTES (ANESTHESIA)

## 2023-08-14 PROCEDURE — 33340 PERQ CLSR TCAT L ATR APNDGE: CPT | Performed by: INTERNAL MEDICINE

## 2023-08-14 PROCEDURE — 6360000002 HC RX W HCPCS: Performed by: INTERNAL MEDICINE

## 2023-08-14 PROCEDURE — C1894 INTRO/SHEATH, NON-LASER: HCPCS

## 2023-08-14 PROCEDURE — 7100000011 HC PHASE II RECOVERY - ADDTL 15 MIN

## 2023-08-14 PROCEDURE — C1760 CLOSURE DEV, VASC: HCPCS

## 2023-08-14 PROCEDURE — C1889 IMPLANT/INSERT DEVICE, NOC: HCPCS

## 2023-08-14 PROCEDURE — 85347 COAGULATION TIME ACTIVATED: CPT

## 2023-08-14 PROCEDURE — 6360000002 HC RX W HCPCS

## 2023-08-14 PROCEDURE — 1200000000 HC SEMI PRIVATE

## 2023-08-14 RX ORDER — AMIODARONE HYDROCHLORIDE 100 MG/1
100 TABLET ORAL DAILY
Status: DISCONTINUED | OUTPATIENT
Start: 2023-08-14 | End: 2023-08-14 | Stop reason: HOSPADM

## 2023-08-14 RX ORDER — SODIUM CHLORIDE 0.9 % (FLUSH) 0.9 %
5-40 SYRINGE (ML) INJECTION EVERY 12 HOURS SCHEDULED
Status: DISCONTINUED | OUTPATIENT
Start: 2023-08-14 | End: 2023-08-14 | Stop reason: HOSPADM

## 2023-08-14 RX ORDER — SODIUM CHLORIDE 9 MG/ML
INJECTION, SOLUTION INTRAVENOUS PRN
Status: DISCONTINUED | OUTPATIENT
Start: 2023-08-14 | End: 2023-08-14 | Stop reason: HOSPADM

## 2023-08-14 RX ORDER — PROTAMINE SULFATE 10 MG/ML
INJECTION, SOLUTION INTRAVENOUS PRN
Status: DISCONTINUED | OUTPATIENT
Start: 2023-08-14 | End: 2023-08-14 | Stop reason: SDUPTHER

## 2023-08-14 RX ORDER — ACETAMINOPHEN 325 MG/1
650 TABLET ORAL EVERY 4 HOURS PRN
Status: DISCONTINUED | OUTPATIENT
Start: 2023-08-14 | End: 2023-08-14 | Stop reason: HOSPADM

## 2023-08-14 RX ORDER — PANTOPRAZOLE SODIUM 20 MG/1
20 TABLET, DELAYED RELEASE ORAL
Status: DISCONTINUED | OUTPATIENT
Start: 2023-08-15 | End: 2023-08-14 | Stop reason: HOSPADM

## 2023-08-14 RX ORDER — ECHINACEA PURPUREA EXTRACT 125 MG
1 TABLET ORAL 2 TIMES DAILY
Status: DISCONTINUED | OUTPATIENT
Start: 2023-08-14 | End: 2023-08-14 | Stop reason: HOSPADM

## 2023-08-14 RX ORDER — DEXAMETHASONE SODIUM PHOSPHATE 4 MG/ML
INJECTION, SOLUTION INTRA-ARTICULAR; INTRALESIONAL; INTRAMUSCULAR; INTRAVENOUS; SOFT TISSUE PRN
Status: DISCONTINUED | OUTPATIENT
Start: 2023-08-14 | End: 2023-08-14 | Stop reason: SDUPTHER

## 2023-08-14 RX ORDER — SPIRONOLACTONE 25 MG/1
12.5 TABLET ORAL DAILY
Status: DISCONTINUED | OUTPATIENT
Start: 2023-08-14 | End: 2023-08-14 | Stop reason: HOSPADM

## 2023-08-14 RX ORDER — PROPOFOL 10 MG/ML
INJECTION, EMULSION INTRAVENOUS PRN
Status: DISCONTINUED | OUTPATIENT
Start: 2023-08-14 | End: 2023-08-14 | Stop reason: SDUPTHER

## 2023-08-14 RX ORDER — ONDANSETRON 2 MG/ML
INJECTION INTRAMUSCULAR; INTRAVENOUS PRN
Status: DISCONTINUED | OUTPATIENT
Start: 2023-08-14 | End: 2023-08-14 | Stop reason: SDUPTHER

## 2023-08-14 RX ORDER — METOPROLOL SUCCINATE 50 MG/1
50 TABLET, EXTENDED RELEASE ORAL 2 TIMES DAILY
Status: DISCONTINUED | OUTPATIENT
Start: 2023-08-14 | End: 2023-08-14 | Stop reason: HOSPADM

## 2023-08-14 RX ORDER — SODIUM CHLORIDE 0.9 % (FLUSH) 0.9 %
5-40 SYRINGE (ML) INJECTION PRN
Status: DISCONTINUED | OUTPATIENT
Start: 2023-08-14 | End: 2023-08-14 | Stop reason: HOSPADM

## 2023-08-14 RX ORDER — ROCURONIUM BROMIDE 10 MG/ML
INJECTION, SOLUTION INTRAVENOUS PRN
Status: DISCONTINUED | OUTPATIENT
Start: 2023-08-14 | End: 2023-08-14 | Stop reason: SDUPTHER

## 2023-08-14 RX ORDER — LIDOCAINE HYDROCHLORIDE 20 MG/ML
INJECTION, SOLUTION INFILTRATION; PERINEURAL PRN
Status: DISCONTINUED | OUTPATIENT
Start: 2023-08-14 | End: 2023-08-14 | Stop reason: SDUPTHER

## 2023-08-14 RX ORDER — HEPARIN SODIUM 1000 [USP'U]/ML
INJECTION, SOLUTION INTRAVENOUS; SUBCUTANEOUS PRN
Status: DISCONTINUED | OUTPATIENT
Start: 2023-08-14 | End: 2023-08-14 | Stop reason: SDUPTHER

## 2023-08-14 RX ORDER — GLYCOPYRROLATE 0.2 MG/ML
INJECTION INTRAMUSCULAR; INTRAVENOUS PRN
Status: DISCONTINUED | OUTPATIENT
Start: 2023-08-14 | End: 2023-08-14 | Stop reason: SDUPTHER

## 2023-08-14 RX ORDER — OXYCODONE HYDROCHLORIDE 15 MG/1
15 TABLET ORAL 3 TIMES DAILY PRN
Status: DISCONTINUED | OUTPATIENT
Start: 2023-08-14 | End: 2023-08-14 | Stop reason: HOSPADM

## 2023-08-14 RX ORDER — ASPIRIN 81 MG/1
81 TABLET ORAL DAILY
Status: DISCONTINUED | OUTPATIENT
Start: 2023-08-14 | End: 2023-08-14 | Stop reason: HOSPADM

## 2023-08-14 RX ORDER — ATORVASTATIN CALCIUM 80 MG/1
80 TABLET, FILM COATED ORAL NIGHTLY
Status: DISCONTINUED | OUTPATIENT
Start: 2023-08-14 | End: 2023-08-14 | Stop reason: HOSPADM

## 2023-08-14 RX ORDER — AMLODIPINE BESYLATE 5 MG/1
2.5 TABLET ORAL DAILY
Status: DISCONTINUED | OUTPATIENT
Start: 2023-08-14 | End: 2023-08-14 | Stop reason: HOSPADM

## 2023-08-14 RX ORDER — FENTANYL CITRATE 50 UG/ML
INJECTION, SOLUTION INTRAMUSCULAR; INTRAVENOUS PRN
Status: DISCONTINUED | OUTPATIENT
Start: 2023-08-14 | End: 2023-08-14 | Stop reason: SDUPTHER

## 2023-08-14 RX ORDER — SODIUM CHLORIDE 9 MG/ML
INJECTION, SOLUTION INTRAVENOUS CONTINUOUS
Status: DISCONTINUED | OUTPATIENT
Start: 2023-08-14 | End: 2023-08-14 | Stop reason: HOSPADM

## 2023-08-14 RX ORDER — CLOPIDOGREL BISULFATE 75 MG/1
75 TABLET ORAL DAILY
Status: DISCONTINUED | OUTPATIENT
Start: 2023-08-14 | End: 2023-08-14 | Stop reason: HOSPADM

## 2023-08-14 RX ADMIN — ROCURONIUM BROMIDE 50 MG: 10 INJECTION, SOLUTION INTRAVENOUS at 12:58

## 2023-08-14 RX ADMIN — PROPOFOL 150 MG: 10 INJECTION, EMULSION INTRAVENOUS at 12:58

## 2023-08-14 RX ADMIN — SODIUM CHLORIDE: 9 INJECTION, SOLUTION INTRAVENOUS at 12:50

## 2023-08-14 RX ADMIN — Medication 2000 MG: at 13:15

## 2023-08-14 RX ADMIN — PROTAMINE SULFATE 20 MG: 10 INJECTION, SOLUTION INTRAVENOUS at 13:36

## 2023-08-14 RX ADMIN — HEPARIN SODIUM 2000 UNITS: 1000 INJECTION INTRAVENOUS; SUBCUTANEOUS at 13:19

## 2023-08-14 RX ADMIN — ONDANSETRON 4 MG: 2 INJECTION INTRAMUSCULAR; INTRAVENOUS at 13:11

## 2023-08-14 RX ADMIN — PROPOFOL 50 MG: 10 INJECTION, EMULSION INTRAVENOUS at 12:59

## 2023-08-14 RX ADMIN — FENTANYL CITRATE 50 MCG: 50 INJECTION, SOLUTION INTRAMUSCULAR; INTRAVENOUS at 13:14

## 2023-08-14 RX ADMIN — LIDOCAINE HYDROCHLORIDE 100 MG: 20 INJECTION, SOLUTION INFILTRATION; PERINEURAL at 12:58

## 2023-08-14 RX ADMIN — PHENYLEPHRINE HYDROCHLORIDE 50 MCG/MIN: 10 INJECTION INTRAVENOUS at 13:15

## 2023-08-14 RX ADMIN — PHENYLEPHRINE HYDROCHLORIDE 100 MCG: 10 INJECTION INTRAVENOUS at 13:14

## 2023-08-14 RX ADMIN — HEPARIN SODIUM 8000 UNITS: 1000 INJECTION INTRAVENOUS; SUBCUTANEOUS at 13:14

## 2023-08-14 RX ADMIN — SUGAMMADEX 200 MG: 100 INJECTION, SOLUTION INTRAVENOUS at 13:37

## 2023-08-14 RX ADMIN — FENTANYL CITRATE 50 MCG: 50 INJECTION, SOLUTION INTRAMUSCULAR; INTRAVENOUS at 12:58

## 2023-08-14 RX ADMIN — GLYCOPYRROLATE 0.2 MG: 0.2 INJECTION, SOLUTION INTRAMUSCULAR; INTRAVENOUS at 12:58

## 2023-08-14 RX ADMIN — DEXAMETHASONE SODIUM PHOSPHATE 4 MG: 4 INJECTION, SOLUTION INTRAMUSCULAR; INTRAVENOUS at 13:10

## 2023-08-14 RX ADMIN — GLYCOPYRROLATE 0.4 MG: 0.2 INJECTION, SOLUTION INTRAMUSCULAR; INTRAVENOUS at 13:20

## 2023-08-14 ASSESSMENT — PAIN DESCRIPTION - DESCRIPTORS: DESCRIPTORS: ACHING

## 2023-08-14 ASSESSMENT — PAIN SCALES - GENERAL
PAINLEVEL_OUTOF10: 0
PAINLEVEL_OUTOF10: 0
PAINLEVEL_OUTOF10: 3

## 2023-08-14 ASSESSMENT — PAIN DESCRIPTION - FREQUENCY: FREQUENCY: CONTINUOUS

## 2023-08-14 ASSESSMENT — PAIN DESCRIPTION - ORIENTATION: ORIENTATION: LEFT

## 2023-08-14 ASSESSMENT — PAIN DESCRIPTION - ONSET: ONSET: SUDDEN

## 2023-08-14 ASSESSMENT — PAIN DESCRIPTION - LOCATION: LOCATION: CHEST

## 2023-08-14 ASSESSMENT — PAIN DESCRIPTION - PAIN TYPE: TYPE: ACUTE PAIN

## 2023-08-14 NOTE — H&P
[facility]   Electrophysiology      CC: Afib   HPI: Amanda Cabrera is a 79 y.o. male with a PMH of atrial fibrillation, CVA, HFrEF, Mitral regurgitation and HTN    He also has had history of right parietal parenchymal bleed x2 in 2013     3/1/2023 presented to the hospital with shortness of breath and hemoptysis. He started having shortness of breath about a week prior. He was found to be in atrial fibrillation with rapid ventricular response. 3/3/2023 S/P Successful PCI to critical mid RCA with one drug eluting stent. Followed by staged,PCI to RCA , PCI to OM1, PCI to OM2 and PCI to LAD on 3/6/2023. Failed DCCV 3/7/2023. Loaded with amiodarone     Successful DCCV 4/6/2023. Sara Leong presents to the office in follow up. He is unsure what medications he is taking as his ex-wife manages his medications. He has questions regarding his ablation of atrial flutter. He is in normal rhythm per ECG today. We discussed the ablation can wait for now unless he has recurrent episodes of flutter/fibrillation. He will need urgent referral for evaluation of his severe MR. Assessment and plan:   -Severe Mitral regurgitation    Needs surgical evaluation to see if he is a candidate for mitral valve surgery. If he cannot proceed with mitral valve surgery then we recommend CHENCHO ligation with atrial clip and maze procedure. Referral also placed to [facility] for surgical evaluation    Referral to general cardiology for evaluation of severe MR and candidacy for surgery.       -Paroxysmal Atrial fibrillation/flutter    Discussed ablation of atrial flutter with him in the past.  He was not interested in scheduling the ablation. He would like to know more about his valvular regurgitation and treatment options for it. Amanda Cabrera has been evaluated for left atrial appendage closure with WATCHMAN device for management of stroke risk resulting from non-valvular atrial fibrillation.      Patient is a poor

## 2023-08-14 NOTE — PROGRESS NOTES
Pt set up in bed, provided PO fluids and crackers. Pt states he is still having chest pain although pain is decreasing. Pt provided urinal. Dr Jason Sainz at bedside to speak with pt. Pt family brought back to bedside to sit with pt.

## 2023-08-14 NOTE — PROGRESS NOTES
Pt complaining of sharp left sided chest pain, Juan FRANKS called and notified, EKG ordered. VSS, pt remains in SB on telemetry monitor.

## 2023-08-14 NOTE — ANESTHESIA POSTPROCEDURE EVALUATION
Department of Anesthesiology  Postprocedure Note    Patient: Sasha Rowe  MRN: [id number]  YOB: 1955  Date of evaluation: 8/14/2023      Procedure Summary     Date: 08/14/23 Room / Location: [facility] Cath Lab; [facility] Echocardiography    Anesthesia Start: 1250 Anesthesia Stop: 3216    Procedure: ECHO ARAMIS IN CARDIAC CATH Diagnosis:       Paroxysmal atrial fibrillation      PAF (paroxysmal atrial fibrillation) ([facility])    Scheduled Providers: Davis Vasquez MD Responsible Provider: Davis Vasquez MD    Anesthesia Type: general ASA Status: 3          Anesthesia Type: No value filed.     Hayden Phase I: Hayden Score: 8    Hayden Phase II:        Anesthesia Post Evaluation    Patient location during evaluation: PACU  Patient participation: complete - patient participated  Level of consciousness: awake  Airway patency: patent  Nausea & Vomiting: no vomiting  Complications: no  Cardiovascular status: hemodynamically stable  Respiratory status: acceptable  Hydration status: euvolemic  Multimodal analgesia pain management approach

## 2023-08-14 NOTE — PROGRESS NOTES
Pt arrived from cath lab, report from crna/rn. Pt had watchman procedure, right groin dressing CDI, no swelling/ drainage/ bruising noted at site. Pt laying in supine position,  arousable upon arrival, respirations even unlabored, simple mask 6 liters in place. Pt in NSR on telemetry monitor.

## 2023-08-14 NOTE — PROGRESS NOTES
Pt awake and alert, VSS. Pt given dentures and glasses to put back on, no report of pain or nausea. Pt remains in NSR on telemetry monitor.

## 2023-08-14 NOTE — DISCHARGE SUMMARY
infarct , age undetermined  Abnormal ECG  When compared with ECG of 09-AUG-2023 01:54,  Sinus rhythm has replaced Atrial fibrillation  Vent. rate has decreased BY 38 BPM  Questionable change in QRS duration  Minimal criteria for Inferior infarct are now Present    Echo: 5/30/23  Summary   Normal left ventricle size, wall thickness, and systolic function with an   estimated ejection fraction of 55%. Moderate functional mitral regurgitation. Trace aortic regurgitation. There is a tiny atrial level shunt at baseline by doppler. There is atheromatous plaque in the aorta. Limited echo: 8/14/23  Summary   Limited exam S/P Watchman procedure to evaluate for pericardial effusion. Low normal global ejection fraction estimated at 50%. No pericardial effusion noted.     Procedure: 08/14/23  Percutaneous transcatheter closure of the left atrial appendage with endocardial implant   Transeptal Puncture  ARAMIS  Labs:   Lab Results   Component Value Date    CREATININE 1.5 (H) 08/08/2023    BUN 16 08/08/2023     08/08/2023    K 3.7 08/08/2023     08/08/2023    CO2 23 08/08/2023      Lab Results   Component Value Date    WBC 8.8 08/08/2023    HGB 13.2 (L) 08/08/2023    HCT 37.4 (L) 08/08/2023    MCV 91.0 08/08/2023     08/08/2023      Lab Results   Component Value Date    INR 1.01 08/06/2013    PROTIME 11.3 08/06/2013        Disposition: home    Patient Instructions:        Medication List        CONTINUE taking these medications      amiodarone 200 MG tablet  Commonly known as: CORDARONE  Take 0.5 tablets by mouth daily     amLODIPine 5 MG tablet  Commonly known as: Norvasc  Take 0.5 tablets by mouth daily     aspirin 81 MG EC tablet  Commonly known as: Aspirin 81  Take 1 tablet by mouth daily     atorvastatin 80 MG tablet  Commonly known as: LIPITOR  Take 1 tablet by mouth nightly     clopidogrel 75 MG tablet  Commonly known as: PLAVIX  Take 1 tablet by mouth daily     empagliflozin 10 MG

## 2023-08-14 NOTE — ANESTHESIA PRE PROCEDURE
Department of Anesthesiology  Preprocedure Note       Name:  Chelle Murphy   Age:  79 y.o.  :  1955                                          MRN:  [id number]         Date:  2023      Surgeon: * Surgery not found *    Procedure:     Medications prior to admission:   Prior to Admission medications    Medication Sig Start Date End Date Taking?  Authorizing Provider   sacubitril-valsartan (ENTRESTO) 49-51 MG per tablet Take 1 tablet by mouth 2 times daily 23   Estil Almas, DO   metoprolol succinate (TOPROL XL) 50 MG extended release tablet Take 1 tablet by mouth in the morning and at bedtime 23   Estil Almas, DO   spironolactone (ALDACTONE) 25 MG tablet Take 0.5 tablets by mouth daily 23   Estil Almas, DO   clopidogrel (PLAVIX) 75 MG tablet Take 1 tablet by mouth daily 23   Estil Almas, DO   atorvastatin (LIPITOR) 80 MG tablet Take 1 tablet by mouth nightly 23   Estil Almas, DO   pantoprazole (PROTONIX) 20 MG tablet Take 1 tablet by mouth every morning (before breakfast) 23   Estil [place], DO   empagliflozin (JARDIANCE) 10 MG tablet Take 1 tablet by mouth daily 23   Estil Almas, DO   sodium chloride (OCEAN NASAL SPRAY) 0.65 % nasal spray 1 spray by Nasal route in the morning and at bedtime 23   Estil Almas, DO   sacubitril-valsartan (ENTRESTO) 24-26 MG per tablet Take 1.5 tablets by mouth 2 times daily 23  Estil [place], DO   amiodarone (CORDARONE) 200 MG tablet Take 0.5 tablets by mouth daily 23   GOLDEN Aponte CNP   aspirin (ASPIRIN 81) 81 MG EC tablet Take 1 tablet by mouth daily 23   Estil Almas, DO   chlorhexidine (HIBICLENS) 4 % external liquid Wash from neck down the night before or morning of the procedure 23  Bobbetta Duverney, MD   amLODIPine (NORVASC) 5 MG tablet Take 0.5 tablets by mouth daily 23   Laina Coburn DO   Glucosamine HCl (GLUCOSAMINE PO) Take 1,000 mg by mouth daily    Historical

## 2023-08-14 NOTE — TELEPHONE ENCOUNTER
8/14/2023 s/p SUCCESSFUL WATCHMAN LAAC PROCEDURE PER DR. Jayson Petit deployment of left atrial appendage occlusion device with no complication, WATCHMAN device used 31 mm size. --6-month f/u unable to schedule d/t next year the schedule is not out yet. All will print on discharge after visit summary  --1-year f/u unable to schedule d/t next year the schedule is not out yet. All will print on discharge after visit summary. Monique, Schedule a ARAMIS  Post procedure ARAMIS to be completed 45-59 days post procedure (9/28/2023-10/12/2023) Order placed. Thanks.    Gerard Kiran RN, BSN   Watchman Coordinator

## 2023-08-14 NOTE — PROGRESS NOTES
Discharge instructions reviewed and understanding verbalized per pt and pt family Giovanni Renee at bedside. Chantel Hathaway at bedside to assess pt prior to discharge. Right groin site CDI. No report of pain or nausea, pt reports his chest pain has resolved. Pt up to ambulate with steady gait, no bleeding at right groin site after walking. Pt to bathroom to void.

## 2023-08-14 NOTE — DISCHARGE INSTRUCTIONS
ANESTHESIA DISCHARGE INSTRUCTIONS    Wear your seatbelt home. You are under the influence of drugs-do not drink alcohol, drive, operate machinery, make any important decisions or sign any legal documents for 24 hours. Children should not ride bikes, [place] or play on gym sets for 24 hours after surgery. A responsible adult needs to be with you for 24 hours. You may experience lightheadedness, dizziness, or sleepiness following surgery. Rest at home today- increase activity as tolerated. Progress slowly to a regular diet unless your physician has instructed you otherwise. Drink plenty of water. If persistent nausea and vomiting becomes a problem, call your physician. Coughing, sore throat and muscle aches are other side effects of anesthesia, and should improve with time. Do not drive or operate machinery while taking narcotics. Watchman Discharge Instruction    Care of your puncture site:  Remove bandage 24 hours after the procedure. May shower in 24 hours but do not sit in a bathtub/pool of water for 5 days or until the wound is healed. Gently clean groin using soap and water. Dry thoroughly and apply a Band-Aid that covers the entire site. Use Band-Aid until skin heals over in about 3-5 days. Do not apply powder or lotion. Limit walking and stair climbing today. Normal Observations:  Soreness or tenderness which may last one week. Mild oozing from the incision site. Possible bruising that could last 2 weeks. Activity:  You may resume normal activity in 5 days or after the wound heals. Avoid lifting more than 10 pounds for 5 days or until the wound heals. Avoid strenuous exercise or activity for 1 week. You may return to work in 1 week  without restrictions       Call your doctor immediately if your condition worsens, for any other concerns, for a follow-up appointment or if you experience any of the following:  Increased swelling on the groin or leg.   Unusual pain,

## 2023-08-14 NOTE — PROCEDURES
septum were done using ARAMIS guidance as well as pressure monitoring , and fluoroscopy in Polish and KO projections. We placed one SL-1 Sheaths inside the left atrium. A long wire was placed into the left superior pulmonary vein. Then the SL sheath was exchanged over the wire with double curve watchman access sheath. Then a pigtail catheter was inserted into the access sheath and was placed inside the left atrial appendage. Angiography of CHENCHO was performed. Pigtail catheter was removed. Then Watchman delivery sheath was inserted into the access sheath. Using fluoroscopy and live ARAMIS the anterior lobe of the appendage was located and delivery sheath was advanced into this lobe. Then device was implanted into the appendage under fluoroscopy and live ARAMIS imaging. It was noted that the device had a bit of shoulder in the inferior portion. ATug test was done multiple times to insure device stability   ARAMIS guidance and 3D guidance revealed device to be well seated did not correct the position. After deployment a tug test was done and stability of device was checked. Position of device was checked. Measurement of device showed appropriate compression of the device. Using color Doppler, no leak around the was noted. PASS criteria for releasing of device were met and device was released. A Perclose was used to achieve hemostasis. Patient was extubated and transferred to the floor. No immediate complications noted. Assessment and Summary:    successful deployment of left atrial appendage occlusion device with no complication    WATCHMAN device used 31 mm size     Angiogram revealed good seal and no thrombus     ARAMIS confirmed placement and seal    EBL Less than 50 mL  No complications        Plan:     The patient will be admitted and have usual post care     Start ASA,  plavix     Patient is participating in the left atrial appendage occlusion/closure registry.  [address] Registry is approved by the [facility] for

## 2023-08-15 ENCOUNTER — TELEPHONE (OUTPATIENT)
Dept: CARDIOLOGY CLINIC | Age: 68
End: 2023-08-15

## 2023-08-15 ENCOUNTER — HOSPITAL ENCOUNTER (EMERGENCY)
Age: 68
Discharge: HOME | End: 2023-08-15
Payer: MEDICARE

## 2023-08-15 ENCOUNTER — TELEPHONE (OUTPATIENT)
Dept: OTHER | Facility: CLINIC | Age: 68
End: 2023-08-15

## 2023-08-15 VITALS
HEART RATE: 52 BPM | SYSTOLIC BLOOD PRESSURE: 134 MMHG | OXYGEN SATURATION: 97 % | RESPIRATION RATE: 18 BRPM | TEMPERATURE: 97.7 F | DIASTOLIC BLOOD PRESSURE: 68 MMHG

## 2023-08-15 DIAGNOSIS — R33.8 POSTOPERATIVE URINARY RETENTION: Primary | ICD-10-CM

## 2023-08-15 DIAGNOSIS — N99.89 POSTOPERATIVE URINARY RETENTION: Primary | ICD-10-CM

## 2023-08-15 LAB
BILIRUB UR QL STRIP.AUTO: NEGATIVE
CLARITY UR: CLEAR
COLOR UR: YELLOW
GLUCOSE BLD-MCNC: 79 MG/DL (ref 70–99)
GLUCOSE UR STRIP.AUTO-MCNC: >=1000 MG/DL
HGB UR QL STRIP.AUTO: NEGATIVE
KETONES UR STRIP.AUTO-MCNC: NEGATIVE MG/DL
LEUKOCYTE ESTERASE UR QL STRIP.AUTO: NEGATIVE
NITRITE UR QL STRIP.AUTO: NEGATIVE
PERFORMED ON: NORMAL
PH UR STRIP.AUTO: 5.5 [PH] (ref 5–8)
PROT UR STRIP.AUTO-MCNC: NEGATIVE MG/DL
SP GR UR STRIP.AUTO: 1.01 (ref 1–1.03)
UA COMPLETE W REFLEX CULTURE PNL UR: ABNORMAL
UA DIPSTICK W REFLEX MICRO PNL UR: ABNORMAL
URN SPEC COLLECT METH UR: ABNORMAL
UROBILINOGEN UR STRIP-ACNC: 0.2 E.U./DL

## 2023-08-15 PROCEDURE — 51798 US URINE CAPACITY MEASURE: CPT

## 2023-08-15 PROCEDURE — 51702 INSERT TEMP BLADDER CATH: CPT

## 2023-08-15 PROCEDURE — 99283 EMERGENCY DEPT VISIT LOW MDM: CPT

## 2023-08-15 PROCEDURE — 81003 URINALYSIS AUTO W/O SCOPE: CPT

## 2023-08-15 ASSESSMENT — ENCOUNTER SYMPTOMS
CHEST TIGHTNESS: 0
COUGH: 0
BACK PAIN: 0
VOMITING: 0
COLOR CHANGE: 0
SHORTNESS OF BREATH: 0
ABDOMINAL PAIN: 1
DIARRHEA: 0
ABDOMINAL DISTENTION: 1
NAUSEA: 0
RESPIRATORY NEGATIVE: 1
CONSTIPATION: 0

## 2023-08-15 NOTE — TELEPHONE ENCOUNTER
Steve Lundberg have schedule [facility] on 10-6-23 at 10:30 am at [facility] with [facility] for [employer] ARAMIS.

## 2023-08-15 NOTE — TELEPHONE ENCOUNTER
I called pt. Pt stated he is feeling better than before. He said if he feel bad tomorrow he will go to ER.

## 2023-08-15 NOTE — TELEPHONE ENCOUNTER
Pt states since having watchman procedure yesterday he is unable to urinate and is very uncomfortable. Please call to advise.

## 2023-08-15 NOTE — ED NOTES
Horton catheter inserted and 1200mL noted upon insertion. [place], [place] aware.       Qing Andrade RN  08/15/23 [id number]

## 2023-08-15 NOTE — ED PROVIDER NOTES
[facility]        Pt Name: Mirna Tucker  MRN: [id number]  [address] [facility] [place] 1955  Date of evaluation: 8/15/2023  Provider: ANANTH Lepe  PCP: Deni Bravo MD  Note Started: 7:13 PM EDT 8/15/23      OTILIO. I have evaluated this patient. CHIEF COMPLAINT       Chief Complaint   Patient presents with    Post-op Problem     Had the watchman placed yesterday 8/14 and hasn't been able to urinate since. HISTORY OF PRESENT ILLNESS: 1 or more Elements     History From: Patient  Limitations to history : None    Mirna Tucker is a 79 y.o. male with past medical history of atrial fibrillation, CHF, hypertension, CVA, CAD and hyperlipidemia who presents ED with complaint of a postop problem. Yesterday he had Watchman procedure. Patient states ever since the procedure he has not been able to urinate. Patient states he urinated prior to the procedure but did not urinate after the procedure. He went home and states he had a hard time sleeping last night because he had some abdominal distention and cramping. Today he could not use the bathroom but felt like he had to. Patient states he called the office and they recommended he come to the ED for further evaluation and treatment. He did take a shower and states he was able to get a small amount of urine out but states it is only \"dribbling\". He denies a history of prostate problems or urinary retention in the past.  He came to the ED for further evaluation and treatment. He reports his abdomen is distended and he is having pain to the lower abdomen. He has a flank pain or back pain. Denies fever or chills. Denies changes in bowel movements or bowel incontinence. Denies saddle anesthesia or distal numbness/tingling. Denies chest pain or shortness of breath. Denies any nausea or vomiting. Denies any fever or chills.   He is unsure if he had a catheter

## 2023-08-15 NOTE — ED NOTES
Pt sent home with mich with leg bag. Instructions given and pt verbalized understanding.       Kaela Samano RN  08/15/23 1923

## 2023-08-15 NOTE — TELEPHONE ENCOUNTER
Pt called back stating the went to take shower to go to the ER and everything went [address], its not ok but it is better and wanted the office to know.     Pls advise thank you

## 2023-08-15 NOTE — TELEPHONE ENCOUNTER
Writer contacted ANANTH Meza to inform of 30 day readmission risk. Writer's attempt to contact ANANTH Meza was unsuccessful.      Call Back: If you need to call back to inform of disposition you can contact me at [phone]

## 2023-08-17 NOTE — TELEPHONE ENCOUNTER
I spoke with patient today and he denies any complaints of bleeding or SOB as well no symptoms of Pericardial effusion and no issues with access site either just some soreness at site informed this is normal.  Patient did complain of not being able to urinate and had to go to ER to get a Catheter placed and was left in but will be removed on 8/18/2023. I instructed patient to call me if he has any issue or to go to ER if he has any bleeding or other symptoms we went over. Also instructed to stay on Plavix/BASA unless otherwise and if he starts having issue with blood thinners to call and let us know. Reminded that a Post ARAMIS is schedule on 10/6/2023  and to go to scheduled 6 month follow up appointment.

## 2023-08-25 NOTE — PROGRESS NOTES
[facility]   Electrophysiology Follow up     Date: 8/30/2023  I had the privilege of visiting Lanie Akins in the office. CC: Follow up  HPI: Lanie Akins is a 79 y.o. male with a PMH of atrial fibrillation, CVA, HFrEF, Mitral regurgitation and HTN. Hx of right parietal parenchymal bleed x2 in 2013     In March of 2023, pt presented to the hospital with shortness of breath and hemoptysis. He started having shortness of breath about a week prior. He was found to be in atrial fibrillation with rapid ventricular response. On 3/3/23, he underwent successful PCI to critical mid RCA with one drug eluting stent. Followed by staged PCI to RCA/OM1/OM2LAD on 3/6/2023. Failed DCCV 3/7/2023. He was loaded with amiodarone and underwent successful DCCV 4/6/2023. S/p watchman (8/14/2023). He had urinary retention following with urinary catheter placement in ED 8/15/2023    -Interval history: Today, Marcie Barr is being seen for routine follow up. He underwent Watchman on 8/14. He has felt fine. He is back in atrial fibrillation. He states he cannot tell, however, he states he has noticed he is more SOB walking up hills since his procedure. He states he has noticed a \"numb\" feeling in his body from his neck down as well with heavy activity. No CP or SOB. It will go away quickly after resting. Denies having chest pain, palpitations, shortness of breath, orthopnea/PND, cough, or dizziness at the time of this visit. With regard to medication therapy the patient has been compliant with prescribed regimen. They have tolerated therapy to date. Assessment: 1. Paroxysmal Atrial Fibrillation/Flutter  - Hx of DCCV (4/6/23)  - S/p CHENCHO closure with Watchman (8/14/23)    - Currently in AF  - Will increase amiodarone to 200 mg daily and reduce Toprol XL to 50 mg daily as he was bradycardia during recent EKG pre-Watchman   ~ Monitor for toxicity.  LFTs/TSH normal (3/23)    - [id number] score:high;

## 2023-08-30 ENCOUNTER — OFFICE VISIT (OUTPATIENT)
Dept: CARDIOLOGY CLINIC | Age: 68
End: 2023-08-30
Payer: MEDICARE

## 2023-08-30 VITALS
WEIGHT: 165.6 LBS | DIASTOLIC BLOOD PRESSURE: 58 MMHG | BODY MASS INDEX: 22.43 KG/M2 | HEIGHT: 72 IN | OXYGEN SATURATION: 99 % | HEART RATE: 98 BPM | SYSTOLIC BLOOD PRESSURE: 160 MMHG

## 2023-08-30 DIAGNOSIS — I10 PRIMARY HYPERTENSION: ICD-10-CM

## 2023-08-30 DIAGNOSIS — I62.9 INTRACRANIAL BLEED (HCC): ICD-10-CM

## 2023-08-30 DIAGNOSIS — I25.5 ISCHEMIC CARDIOMYOPATHY: ICD-10-CM

## 2023-08-30 DIAGNOSIS — I48.0 PAF (PAROXYSMAL ATRIAL FIBRILLATION) (HCC): ICD-10-CM

## 2023-08-30 DIAGNOSIS — I34.0 MITRAL VALVE INSUFFICIENCY, UNSPECIFIED ETIOLOGY: Primary | ICD-10-CM

## 2023-08-30 DIAGNOSIS — I48.0 PAROXYSMAL ATRIAL FIBRILLATION (HCC): ICD-10-CM

## 2023-08-30 PROBLEM — I48.91 ATRIAL FIBRILLATION WITH RAPID VENTRICULAR RESPONSE (HCC): Status: RESOLVED | Noted: 2023-03-01 | Resolved: 2023-08-30

## 2023-08-30 PROBLEM — I50.21 ACUTE SYSTOLIC HEART FAILURE (HCC): Status: RESOLVED | Noted: 2023-03-03 | Resolved: 2023-08-30

## 2023-08-30 PROBLEM — J18.9 COMMUNITY ACQUIRED PNEUMONIA: Status: RESOLVED | Noted: 2023-03-03 | Resolved: 2023-08-30

## 2023-08-30 PROCEDURE — 3078F DIAST BP <80 MM HG: CPT | Performed by: NURSE PRACTITIONER

## 2023-08-30 PROCEDURE — 1123F ACP DISCUSS/DSCN MKR DOCD: CPT | Performed by: NURSE PRACTITIONER

## 2023-08-30 PROCEDURE — 99214 OFFICE O/P EST MOD 30 MIN: CPT | Performed by: NURSE PRACTITIONER

## 2023-08-30 PROCEDURE — 93000 ELECTROCARDIOGRAM COMPLETE: CPT | Performed by: NURSE PRACTITIONER

## 2023-08-30 PROCEDURE — 3077F SYST BP >= 140 MM HG: CPT | Performed by: NURSE PRACTITIONER

## 2023-08-30 RX ORDER — AMIODARONE HYDROCHLORIDE 200 MG/1
200 TABLET ORAL DAILY
Qty: 15 TABLET | Refills: 0 | Status: SHIPPED
Start: 2023-08-30

## 2023-08-30 RX ORDER — AMLODIPINE BESYLATE 5 MG/1
2.5 TABLET ORAL 2 TIMES DAILY
Qty: 90 TABLET | Refills: 3 | Status: SHIPPED
Start: 2023-08-30

## 2023-08-30 RX ORDER — METOPROLOL SUCCINATE 50 MG/1
50 TABLET, EXTENDED RELEASE ORAL DAILY
Qty: 180 TABLET | Refills: 3 | Status: SHIPPED
Start: 2023-08-30

## 2023-08-30 NOTE — PATIENT INSTRUCTIONS
Increase amiodarone to 200 mg daily and amlodipine to 2.5 mg twice per day  Reduce Toprol XL to 50 mg daily  Check to see what dose of entresto you are taking  Scheduled for ARAMIS on October

## 2023-09-05 ENCOUNTER — TELEPHONE (OUTPATIENT)
Dept: CARDIOLOGY CLINIC | Age: 68
End: 2023-09-05

## 2023-09-05 DIAGNOSIS — R06.02 SOB (SHORTNESS OF BREATH): ICD-10-CM

## 2023-09-05 DIAGNOSIS — Z79.01 ON ANTICOAGULANT THERAPY: Primary | ICD-10-CM

## 2023-09-05 NOTE — TELEPHONE ENCOUNTER
Pt called stating that are still getting numb from the neck down when walking since they had they watchman placed. Pt was told to call the office back about the   what dosage for the ENTRESTO they are taking it: Pt is taking 1.5 am and 1.5 pm    Pt also has questions for the Jardiance, pt stated they can not afford it.  pt is asking if there is a patients assistance like there was sales the ENTRESTO? Pt would also like to know if the office has samples until the RX gets approved with patient assistance ?

## 2023-09-05 NOTE — TELEPHONE ENCOUNTER
Pt called back asking for the status for samples of Jardiance, they stated they do not want to die waiting for the approval through patient care.     Pls advise

## 2023-09-05 NOTE — TELEPHONE ENCOUNTER
Discussed with Dr. Jozef Carr and Dr. Grazyna Del Real. He needs labs and STAT echo this week.     GOLDEN Marc-CNP

## 2023-09-05 NOTE — TELEPHONE ENCOUNTER
Please provide him with assistance paperwork for Jardiance. Will discuss other symptoms with Dr. Noris Barrios.  May need to consider ischemic testing    GOLDEN Hairston-CNP

## 2023-09-25 RX ORDER — ATORVASTATIN CALCIUM 80 MG/1
80 TABLET, FILM COATED ORAL
Qty: 90 TABLET | Refills: 3 | Status: SHIPPED | OUTPATIENT
Start: 2023-09-25

## 2023-09-25 RX ORDER — AMIODARONE HYDROCHLORIDE 200 MG/1
200 TABLET ORAL DAILY
Qty: 90 TABLET | Refills: 1 | Status: SHIPPED | OUTPATIENT
Start: 2023-09-25

## 2023-10-06 ENCOUNTER — HOSPITAL ENCOUNTER (OUTPATIENT)
Dept: CARDIAC CATH/INVASIVE PROCEDURES | Age: 68
Discharge: HOME | End: 2023-10-06
Attending: INTERNAL MEDICINE | Admitting: INTERNAL MEDICINE
Payer: MEDICARE

## 2023-10-06 VITALS
HEART RATE: 43 BPM | RESPIRATION RATE: 15 BRPM | OXYGEN SATURATION: 100 % | SYSTOLIC BLOOD PRESSURE: 121 MMHG | DIASTOLIC BLOOD PRESSURE: 67 MMHG

## 2023-10-06 DIAGNOSIS — I48.0 PAROXYSMAL A-FIB (HCC): ICD-10-CM

## 2023-10-06 DIAGNOSIS — Z95.818 PRESENCE OF WATCHMAN LEFT ATRIAL APPENDAGE CLOSURE DEVICE: ICD-10-CM

## 2023-10-06 PROCEDURE — 93312 ECHO TRANSESOPHAGEAL: CPT

## 2023-10-06 PROCEDURE — 93325 DOPPLER ECHO COLOR FLOW MAPG: CPT

## 2023-10-06 PROCEDURE — 7100000010 HC PHASE II RECOVERY - FIRST 15 MIN

## 2023-10-06 PROCEDURE — 93320 DOPPLER ECHO COMPLETE: CPT

## 2023-10-06 PROCEDURE — 99152 MOD SED SAME PHYS/QHP 5/>YRS: CPT

## 2023-10-06 RX ORDER — SODIUM CHLORIDE 0.9 % (FLUSH) 0.9 %
5-40 SYRINGE (ML) INJECTION PRN
Status: DISCONTINUED | OUTPATIENT
Start: 2023-10-06 | End: 2023-10-06 | Stop reason: HOSPADM

## 2023-10-06 NOTE — PROCEDURES
[address] [facility]     Electrophysiology Procedure Note       Date of Procedure: 10/6/2023  Patient's Name: Sigrid Abdullhai  YOB: 1955   Medical Record Number: [id number]  Procedure Performed by: Maggie Bonilla MD    Procedures performed:    Trans-esophageal echocardiography  IV sedation. 10:32 AM - Versed 2 mg IV  10:33 AM - Versed 1 mg IV    10:32 AM - Fentanyl 50 mcg IV   An independent trained observer assisted in the monitoring of the patient's level of consciousness and physiological status including vital signs. Indication of the procedure: Watchman procedure     Details of procedure: The patient was brought to the cath lab area in a fasting and non-sedated state. The risks, benefits and alternatives of the procedure were discussed with the patient. The patient opted to proceed with the procedure. Written informed consent was signed and placed in the chart. A timeout protocol was completed to identify the patient and the procedure being performed. IV sedation was provided with IV Versed, Fentanyl initially and ARAMIS was performed     Preliminary ARAMIS results are:   Watchman device is well seated in CHENCHO. No anne-device leak. Moderate MR  Normal EF. Patient tolerated the procedure and no immediate complications noted. Continue with ASA + Plavix for total 6 months. Then plavix can be discontinued followed by ASA.        Maggie Bonilla MD, MPH  [address] [facility]   Office: [phone]  Fax: [phone]

## 2023-10-06 NOTE — H&P
[address] [facility]   Electrophysiology Follow up     Date: 10/6/2023  I had the privilege of visiting Rome Parra in the office. CC: Follow up  HPI: Rome Parra is a 79 y.o. male with a PMH of atrial fibrillation, CVA, HFrEF, Mitral regurgitation and HTN. Hx of right parietal parenchymal bleed x2 in 2013     In March of 2023, pt presented to the hospital with shortness of breath and hemoptysis. He started having shortness of breath about a week prior. He was found to be in atrial fibrillation with rapid ventricular response. On 3/3/23, he underwent successful PCI to critical mid RCA with one drug eluting stent. Followed by staged PCI to RCA/OM1/OM2LAD on 3/6/2023. Failed DCCV 3/7/2023. He was loaded with amiodarone and underwent successful DCCV 4/6/2023. S/p watchman (8/14/2023). He had urinary retention following with urinary catheter placement in ED 8/15/2023    He underwent Watchman on 8/14. He has felt fine. He is back in atrial fibrillation. He states he cannot tell, however, he states he has noticed he is more SOB walking up hills since his procedure. He states he has noticed a \"numb\" feeling in his body from his neck down as well with heavy activity. No CP or SOB. It will go away quickly after resting. Denies having chest pain, palpitations, shortness of breath, orthopnea/PND, cough, or dizziness at the time of this visit. With regard to medication therapy the patient has been compliant with prescribed regimen. They have tolerated therapy to date. He is here for ARAMIS after watchman implantation. Assessment: 1. Paroxysmal Atrial Fibrillation/Flutter  - Hx of DCCV (4/6/23)  - S/p CHENCHO closure with Watchman (8/14/23)    - Currently in AF  - Will increase amiodarone to 200 mg daily and reduce Toprol XL to 50 mg daily as he was bradycardia during recent EKG pre-Watchman   ~ Monitor for toxicity.  LFTs/TSH normal (3/23)    - [id number] score:high; [id number] Vasc score and GOLDEN Rasmussen CNP   atorvastatin (LIPITOR) 80 MG tablet TAKE 1 TABLET BY MOUTH EVERY  NIGHT 9/25/23   GOLDEN Rosenthal CNP   empagliflozin (JARDIANCE) 10 MG tablet Take 1 tablet by mouth daily  Patient not taking: Reported on 10/6/2023 9/6/23   GOLDEN Rosenthal CNP   amLODIPine (NORVASC) 5 MG tablet Take 0.5 tablets by mouth in the morning and at bedtime 8/30/23   GOLDEN Rosenthal CNP   metoprolol succinate (TOPROL XL) 50 MG extended release tablet Take 1 tablet by mouth daily 8/30/23   GOLDEN Rosenthal CNP   spironolactone (ALDACTONE) 25 MG tablet Take 0.5 tablets by mouth daily 7/25/23   Rose Rondon,    clopidogrel (PLAVIX) 75 MG tablet Take 1 tablet by mouth daily 7/25/23   Rose Rondon, DO   pantoprazole (PROTONIX) 20 MG tablet Take 1 tablet by mouth every morning (before breakfast) 7/25/23   Rose Rondon, DO   sodium chloride (OCEAN NASAL SPRAY) 0.65 % nasal spray 1 spray by Nasal route in the morning and at bedtime 7/25/23   Rose Rondon, DO   sacubitril-valsartan (ENTRESTO) 24-26 MG per tablet Take 1.5 tablets by mouth 2 times daily 7/12/23 7/6/24  Rose Rondon, DO   aspirin (ASPIRIN 81) 81 MG EC tablet Take 1 tablet by mouth daily 6/28/23   Rose Rondon, DO   Glucosamine HCl (GLUCOSAMINE PO) Take 1,000 mg by mouth daily    Provider, MD Joe   oxyCODONE (OXY-IR) 15 MG immediate release tablet Take 1 tablet by mouth 3 times daily as needed (TBI).     Provider, Joe, MD     Past Medical History:   Diagnosis Date    Headache     Hypertension     Intracerebral hemorrhage ([address]) 5/12/2013    Recurrent 8/6/13 - Right occipitoparietal    Neck pain     Unspecified cerebral artery occlusion with cerebral infarction      Past Surgical History:   Procedure Laterality Date    SHOULDER SURGERY       No Known Allergies    Pre-Sedation Documentation and Exam:   I have personally completed a history, physical exam & review of systems for this patient (see

## 2023-10-06 NOTE — PROGRESS NOTES
CATH LAB PROCEDURE LOG - TRANSESOPHAGEAL ECHOCARDIOGRAM    PRE PROCEDURE    DATE: 10/6/2023 ARRIVAL TO CATH LAB: 9:44 AM    ID & ALLERGY BAND: On    CONSENT: Yes    NPO SINCE: Midnight    IV SITE: Started in left arm. Pt arrived to Cath Lab. Plan of Care: Hemodynamics and cardiac rhythm will remain stable. Comfort level will be maintained. Respiratory function will remain adequate. Pt/family will verbalize understanding of the procedure. Procedure will be tolerated without complications. Patient will recover from procedure without complications. ID armband on patient and identification verified. Informed consent obtained. Non invasive blood pressure cuff applied, monitoring initiated. EKG pads and pulse oximeter applied, monitoring initiated. Instructions given. Patient and / or family verbalize understanding. H&P will be documented by physician in [address] [place] [address]. Pt has been NPO since midnight. TRANSESOPHAGEAL ECHOCARDIOGRAM    Timeout and fire safety completed. TIMEOUT TIME: 10:31 AM    CORRECT PATIENT VERIFIED. MEMBERS OF THE SURGICAL TEAM/VISITORS INTRODUCED. ALLERGIES ANNOUNCED. CORRECT PROCEDURE VERIFIED. CORRECT PROCEDURAL SITE VERIFIED. CONSENTS VERIFIED. IMPLANT EQUIPMENT, ADDITIONAL SERVICES, SPECIAL REQUIREMENTS AVAILABLE. MEDICATIONS LABELED AND AVAILABLE. APPROPRIATE PRE MEDS HAVE BEEN ADMINISTERED. FIRE SAFETY: ALCOHOL PREP SOLUTION HAD SUFFICIENT TIME TO DISSIPATE IF USED. FIRE SAFETY: SURGICAL SITE OR INCISION ABOVE THE XIPHOID. YES=1, NO=0. FIRE SAFETY: OPEN OXYGEN SOURCE. YES=1, NO=0. FIRE SAFETY: AVAILABLE IGNITION SOURCE. YES=1, NO=0. FIRE SAFETY: SCORE TOTAL = 1.      Viscous Lidocaine administered per verbal order : Yes 10:31 AM     Cetacaine spray administered per verbal order: Yes 10:31 AM     Procedural sedation administered per verbal order protocol via Dr. Jack Beauchamp     Medications verified by Yony Maya RN    10:32 AM - Versed 2 mg IV  10:33 AM - Versed 1 mg

## 2023-10-23 ENCOUNTER — TELEPHONE (OUTPATIENT)
Dept: CARDIOLOGY CLINIC | Age: 68
End: 2023-10-23

## 2023-10-23 RX ORDER — FUROSEMIDE 20 MG/1
20 TABLET ORAL DAILY
Qty: 3 TABLET | Refills: 0 | Status: SHIPPED | OUTPATIENT
Start: 2023-10-23 | End: 2023-10-26

## 2023-10-23 NOTE — TELEPHONE ENCOUNTER
Call placed to Yuvonne Sever. He states that his feet and ankles swell. When he raises his feet at the end of the day it improves.  Per NPSR we will start lasix 20 mg for 3 days also recommended he get compression stockings

## 2023-10-23 NOTE — TELEPHONE ENCOUNTER
Pt called to inform the office that both legs are swelling, Pt is taking his water pill. Please call to discuss what he can do.   Thank you

## 2023-11-06 RX ORDER — PANTOPRAZOLE SODIUM 20 MG/1
20 TABLET, DELAYED RELEASE ORAL
Qty: 90 TABLET | Refills: 0 | Status: SHIPPED | OUTPATIENT
Start: 2023-11-06

## 2023-11-06 NOTE — TELEPHONE ENCOUNTER
Last OV: 08/30/23 NPSR  Next OV: 02/15/24 NPSR   Last Fill:   pantoprazole (PROTONIX) 20 MG tablet 90 tablet 0 7/25/2023     Sig - Route:  Take 1 tablet by mouth every morning (before breakfast) - Oral    Sent to pharmacy as: Pantoprazole Sodium 20 MG Oral Tablet Delayed Release (PROTONIX)    Cosign for Ordering: Accepted by Adriana Ferreira DO on 7/25/2023  9:52 PM    E-Prescribing Status: Receipt confirmed by pharmacy (7/25/2023 10:06 AM EDT)

## 2023-11-24 ENCOUNTER — HOSPITAL ENCOUNTER (INPATIENT)
Age: 68
LOS: 3 days | Discharge: HOME | DRG: 308 | End: 2023-11-27
Attending: EMERGENCY MEDICINE | Admitting: INTERNAL MEDICINE
Payer: MEDICARE

## 2023-11-24 ENCOUNTER — APPOINTMENT (OUTPATIENT)
Dept: GENERAL RADIOLOGY | Age: 68
DRG: 308 | End: 2023-11-24
Payer: MEDICARE

## 2023-11-24 DIAGNOSIS — I48.91 ATRIAL FIBRILLATION WITH RVR (HCC): Primary | ICD-10-CM

## 2023-11-24 PROBLEM — I48.92 ATRIAL FLUTTER WITH RAPID VENTRICULAR RESPONSE (HCC): Status: ACTIVE | Noted: 2023-11-24

## 2023-11-24 LAB
ALBUMIN SERPL-MCNC: 3.8 G/DL (ref 3.4–5)
ALBUMIN/GLOB SERPL: 1.4 {RATIO} (ref 1.1–2.2)
ALP SERPL-CCNC: 97 U/L (ref 40–129)
ALT SERPL-CCNC: 19 U/L (ref 10–40)
ANION GAP SERPL CALCULATED.3IONS-SCNC: 12 MMOL/L (ref 3–16)
ANISOCYTOSIS BLD QL SMEAR: ABNORMAL
AST SERPL-CCNC: 16 U/L (ref 15–37)
BASOPHILS # BLD: 0 K/UL (ref 0–0.2)
BASOPHILS NFR BLD: 0 %
BILIRUB SERPL-MCNC: 0.4 MG/DL (ref 0–1)
BUN SERPL-MCNC: 26 MG/DL (ref 7–20)
CALCIUM SERPL-MCNC: 8.9 MG/DL (ref 8.3–10.6)
CHLORIDE SERPL-SCNC: 107 MMOL/L (ref 99–110)
CO2 SERPL-SCNC: 23 MMOL/L (ref 21–32)
CREAT SERPL-MCNC: 1.1 MG/DL (ref 0.8–1.3)
CRP SERPL-MCNC: <3 MG/L (ref 0–5.1)
DACRYOCYTES BLD QL SMEAR: ABNORMAL
DEPRECATED RDW RBC AUTO: 16 % (ref 12.4–15.4)
EOSINOPHIL # BLD: 0 K/UL (ref 0–0.6)
EOSINOPHIL NFR BLD: 0 %
GFR SERPLBLD CREATININE-BSD FMLA CKD-EPI: >60 ML/MIN/{1.73_M2}
GLUCOSE SERPL-MCNC: 154 MG/DL (ref 70–99)
HCT VFR BLD AUTO: 32.5 % (ref 40.5–52.5)
HGB BLD-MCNC: 10.9 G/DL (ref 13.5–17.5)
LYMPHOCYTES # BLD: 0.6 K/UL (ref 1–5.1)
LYMPHOCYTES NFR BLD: 6 %
MCH RBC QN AUTO: 31.3 PG (ref 26–34)
MCHC RBC AUTO-ENTMCNC: 33.5 G/DL (ref 31–36)
MCV RBC AUTO: 93.4 FL (ref 80–100)
METAMYELOCYTES NFR BLD MANUAL: 5 %
MICROCYTES BLD QL SMEAR: ABNORMAL
MONOCYTES # BLD: 0.2 K/UL (ref 0–1.3)
MONOCYTES NFR BLD: 3 %
MYELOCYTES NFR BLD MANUAL: 2 %
NEUTROPHILS # BLD: 7.4 K/UL (ref 1.7–7.7)
NEUTROPHILS NFR BLD: 83 %
NT-PROBNP SERPL-MCNC: 3260 PG/ML (ref 0–124)
PLATELET # BLD AUTO: 327 K/UL (ref 135–450)
PLATELET BLD QL SMEAR: ADEQUATE
PMV BLD AUTO: 8.4 FL (ref 5–10.5)
POLYCHROMASIA BLD QL SMEAR: ABNORMAL
POTASSIUM SERPL-SCNC: 4.4 MMOL/L (ref 3.5–5.1)
PROT SERPL-MCNC: 6.5 G/DL (ref 6.4–8.2)
RBC # BLD AUTO: 3.48 M/UL (ref 4.2–5.9)
SLIDE REVIEW: ABNORMAL
SODIUM SERPL-SCNC: 142 MMOL/L (ref 136–145)
TROPONIN, HIGH SENSITIVITY: 21 NG/L (ref 0–22)
TROPONIN, HIGH SENSITIVITY: 23 NG/L (ref 0–22)
VARIANT LYMPHS NFR BLD MANUAL: 1 % (ref 0–6)
WBC # BLD AUTO: 8.2 K/UL (ref 4–11)

## 2023-11-24 PROCEDURE — 82607 VITAMIN B-12: CPT

## 2023-11-24 PROCEDURE — 84439 ASSAY OF FREE THYROXINE: CPT

## 2023-11-24 PROCEDURE — 6360000002 HC RX W HCPCS: Performed by: INTERNAL MEDICINE

## 2023-11-24 PROCEDURE — 85025 COMPLETE CBC W/AUTO DIFF WBC: CPT

## 2023-11-24 PROCEDURE — 82728 ASSAY OF FERRITIN: CPT

## 2023-11-24 PROCEDURE — 2580000003 HC RX 258: Performed by: INTERNAL MEDICINE

## 2023-11-24 PROCEDURE — 96376 TX/PRO/DX INJ SAME DRUG ADON: CPT

## 2023-11-24 PROCEDURE — 84484 ASSAY OF TROPONIN QUANT: CPT

## 2023-11-24 PROCEDURE — 2500000003 HC RX 250 WO HCPCS: Performed by: PHYSICIAN ASSISTANT

## 2023-11-24 PROCEDURE — 93005 ELECTROCARDIOGRAM TRACING: CPT | Performed by: EMERGENCY MEDICINE

## 2023-11-24 PROCEDURE — 82746 ASSAY OF FOLIC ACID SERUM: CPT

## 2023-11-24 PROCEDURE — 6370000000 HC RX 637 (ALT 250 FOR IP): Performed by: INTERNAL MEDICINE

## 2023-11-24 PROCEDURE — 84443 ASSAY THYROID STIM HORMONE: CPT

## 2023-11-24 PROCEDURE — 80053 COMPREHEN METABOLIC PANEL: CPT

## 2023-11-24 PROCEDURE — 96374 THER/PROPH/DIAG INJ IV PUSH: CPT

## 2023-11-24 PROCEDURE — 71045 X-RAY EXAM CHEST 1 VIEW: CPT

## 2023-11-24 PROCEDURE — 1200000000 HC SEMI PRIVATE

## 2023-11-24 PROCEDURE — 83880 ASSAY OF NATRIURETIC PEPTIDE: CPT

## 2023-11-24 PROCEDURE — 36415 COLL VENOUS BLD VENIPUNCTURE: CPT

## 2023-11-24 PROCEDURE — 2500000003 HC RX 250 WO HCPCS: Performed by: INTERNAL MEDICINE

## 2023-11-24 PROCEDURE — 86140 C-REACTIVE PROTEIN: CPT

## 2023-11-24 PROCEDURE — 83540 ASSAY OF IRON: CPT

## 2023-11-24 PROCEDURE — 83550 IRON BINDING TEST: CPT

## 2023-11-24 PROCEDURE — 99285 EMERGENCY DEPT VISIT HI MDM: CPT

## 2023-11-24 RX ORDER — SODIUM CHLORIDE 0.9 % (FLUSH) 0.9 %
5-40 SYRINGE (ML) INJECTION EVERY 12 HOURS SCHEDULED
Status: DISCONTINUED | OUTPATIENT
Start: 2023-11-24 | End: 2023-11-27 | Stop reason: HOSPADM

## 2023-11-24 RX ORDER — AMIODARONE HYDROCHLORIDE 200 MG/1
200 TABLET ORAL DAILY
Status: DISCONTINUED | OUTPATIENT
Start: 2023-11-25 | End: 2023-11-25

## 2023-11-24 RX ORDER — ASPIRIN 81 MG/1
81 TABLET ORAL DAILY
Status: DISCONTINUED | OUTPATIENT
Start: 2023-11-25 | End: 2023-11-27 | Stop reason: HOSPADM

## 2023-11-24 RX ORDER — ACETAMINOPHEN 325 MG/1
650 TABLET ORAL EVERY 6 HOURS PRN
Status: DISCONTINUED | OUTPATIENT
Start: 2023-11-24 | End: 2023-11-27 | Stop reason: HOSPADM

## 2023-11-24 RX ORDER — SPIRONOLACTONE 25 MG/1
12.5 TABLET ORAL DAILY
Status: DISCONTINUED | OUTPATIENT
Start: 2023-11-25 | End: 2023-11-27 | Stop reason: HOSPADM

## 2023-11-24 RX ORDER — TAMSULOSIN HYDROCHLORIDE 0.4 MG/1
0.4 CAPSULE ORAL DAILY
COMMUNITY

## 2023-11-24 RX ORDER — FUROSEMIDE 10 MG/ML
60 INJECTION INTRAMUSCULAR; INTRAVENOUS ONCE
Status: COMPLETED | OUTPATIENT
Start: 2023-11-24 | End: 2023-11-24

## 2023-11-24 RX ORDER — DILTIAZEM HYDROCHLORIDE 5 MG/ML
10 INJECTION INTRAVENOUS ONCE
Status: COMPLETED | OUTPATIENT
Start: 2023-11-24 | End: 2023-11-24

## 2023-11-24 RX ORDER — ACETAMINOPHEN 650 MG/1
650 SUPPOSITORY RECTAL EVERY 6 HOURS PRN
Status: DISCONTINUED | OUTPATIENT
Start: 2023-11-24 | End: 2023-11-27 | Stop reason: HOSPADM

## 2023-11-24 RX ORDER — POLYETHYLENE GLYCOL 3350 17 G/17G
17 POWDER, FOR SOLUTION ORAL DAILY PRN
Status: DISCONTINUED | OUTPATIENT
Start: 2023-11-24 | End: 2023-11-27 | Stop reason: HOSPADM

## 2023-11-24 RX ORDER — ENOXAPARIN SODIUM 100 MG/ML
40 INJECTION SUBCUTANEOUS DAILY
Status: DISCONTINUED | OUTPATIENT
Start: 2023-11-25 | End: 2023-11-27 | Stop reason: HOSPADM

## 2023-11-24 RX ORDER — SODIUM CHLORIDE 0.9 % (FLUSH) 0.9 %
5-40 SYRINGE (ML) INJECTION PRN
Status: DISCONTINUED | OUTPATIENT
Start: 2023-11-24 | End: 2023-11-27 | Stop reason: HOSPADM

## 2023-11-24 RX ORDER — ATORVASTATIN CALCIUM 80 MG/1
80 TABLET, FILM COATED ORAL NIGHTLY
Status: DISCONTINUED | OUTPATIENT
Start: 2023-11-24 | End: 2023-11-27 | Stop reason: HOSPADM

## 2023-11-24 RX ORDER — CLOPIDOGREL BISULFATE 75 MG/1
75 TABLET ORAL DAILY
Status: DISCONTINUED | OUTPATIENT
Start: 2023-11-25 | End: 2023-11-27 | Stop reason: HOSPADM

## 2023-11-24 RX ORDER — METOPROLOL TARTRATE 1 MG/ML
2.5 INJECTION, SOLUTION INTRAVENOUS EVERY 6 HOURS
Status: DISCONTINUED | OUTPATIENT
Start: 2023-11-24 | End: 2023-11-27

## 2023-11-24 RX ORDER — DILTIAZEM HCL IN NACL,ISO-OSM 125 MG/125
2.5-15 PLASTIC BAG, INJECTION (ML) INTRAVENOUS CONTINUOUS
Status: DISCONTINUED | OUTPATIENT
Start: 2023-11-24 | End: 2023-11-27 | Stop reason: HOSPADM

## 2023-11-24 RX ORDER — ONDANSETRON 4 MG/1
4 TABLET, ORALLY DISINTEGRATING ORAL EVERY 8 HOURS PRN
Status: DISCONTINUED | OUTPATIENT
Start: 2023-11-24 | End: 2023-11-27 | Stop reason: HOSPADM

## 2023-11-24 RX ORDER — PANTOPRAZOLE SODIUM 40 MG/1
40 TABLET, DELAYED RELEASE ORAL
Status: DISCONTINUED | OUTPATIENT
Start: 2023-11-25 | End: 2023-11-27 | Stop reason: HOSPADM

## 2023-11-24 RX ORDER — ONDANSETRON 2 MG/ML
4 INJECTION INTRAMUSCULAR; INTRAVENOUS EVERY 6 HOURS PRN
Status: DISCONTINUED | OUTPATIENT
Start: 2023-11-24 | End: 2023-11-27 | Stop reason: HOSPADM

## 2023-11-24 RX ORDER — SODIUM CHLORIDE 9 MG/ML
INJECTION, SOLUTION INTRAVENOUS PRN
Status: DISCONTINUED | OUTPATIENT
Start: 2023-11-24 | End: 2023-11-27 | Stop reason: HOSPADM

## 2023-11-24 RX ADMIN — FUROSEMIDE 60 MG: 10 INJECTION, SOLUTION INTRAMUSCULAR; INTRAVENOUS at 21:59

## 2023-11-24 RX ADMIN — Medication 2.5 MG/HR: at 20:11

## 2023-11-24 RX ADMIN — METOPROLOL TARTRATE 2.5 MG: 5 INJECTION INTRAVENOUS at 20:07

## 2023-11-24 RX ADMIN — SACUBITRIL AND VALSARTAN 1.5 TABLET: 24; 26 TABLET, FILM COATED ORAL at 23:29

## 2023-11-24 RX ADMIN — Medication 10 ML: at 23:19

## 2023-11-24 RX ADMIN — ATORVASTATIN CALCIUM 80 MG: 80 TABLET, FILM COATED ORAL at 23:29

## 2023-11-24 RX ADMIN — DILTIAZEM HYDROCHLORIDE 10 MG: 5 INJECTION, SOLUTION INTRAVENOUS at 18:33

## 2023-11-24 RX ADMIN — DILTIAZEM HYDROCHLORIDE 10 MG: 5 INJECTION, SOLUTION INTRAVENOUS at 19:04

## 2023-11-24 ASSESSMENT — ENCOUNTER SYMPTOMS
ABDOMINAL PAIN: 0
RHINORRHEA: 0
SHORTNESS OF BREATH: 0
NAUSEA: 0
VOMITING: 0
DIARRHEA: 0
COUGH: 0

## 2023-11-24 ASSESSMENT — PAIN - FUNCTIONAL ASSESSMENT: PAIN_FUNCTIONAL_ASSESSMENT: NONE - DENIES PAIN

## 2023-11-24 ASSESSMENT — LIFESTYLE VARIABLES
HOW OFTEN DO YOU HAVE A DRINK CONTAINING ALCOHOL: NEVER
HOW MANY STANDARD DRINKS CONTAINING ALCOHOL DO YOU HAVE ON A TYPICAL DAY: PATIENT DOES NOT DRINK

## 2023-11-24 NOTE — ED PROVIDER NOTES
[facility]        Pt Name: Nirmal Burton  MRN: [id number]  [address] 1955  Date of evaluation: 11/24/2023  Provider: Hartford Skiff, PA-C  PCP: Yudi Veloz MD  Note Started: 6:52 PM EST 11/24/23       I have seen and evaluated this patient with my supervising physician Inder Ellis, [address]       Chief Complaint   Patient presents with    Tachycardia     Tachycardia onset last evening after dinner. Pt c/o feeling of heart racing, noted tachycardia on home BP cuff and BP cuff at store. Hx A. Fib. HISTORY OF PRESENT ILLNESS: 1 or more Elements     History From: Patient  Limitations to history : None    Nirmal Burton is a 76 y.o. male who presents to the emergency department today for evaluation for concerns of palpitations. The patient states that he does have a history of atrial fibrillation, he states he is on Plavix for this, and does have a watchman in place. Patient states that he has been compliant with his medications. The patient states that yesterday after dinner, he states that he felt that his heart was racing, and he states that he thought that it was from eating too much salt. The patient states that throughout the day, he has felt that his heart was racing, and he states that he checked his heart rate at home and it was in the 130s. The patient states he otherwise has no chest pain. No shortness of breath. He denies been dizzy or lightheaded. He has no fevers. No recent cough congestion. No vomiting or diarrhea. No urinary symptoms, no other complaint    Nursing Notes were all reviewed and agreed with or any disagreements were addressed in the HPI. REVIEW OF SYSTEMS :      Review of Systems   Constitutional:  Negative for activity change, appetite change, chills and fever. HENT:  Negative for congestion and rhinorrhea.     Respiratory:  Negative for cough and shortness

## 2023-11-25 PROBLEM — R00.1 BRADYCARDIA: Status: ACTIVE | Noted: 2023-11-25

## 2023-11-25 PROBLEM — I48.4 ATYPICAL ATRIAL FLUTTER (HCC): Status: ACTIVE | Noted: 2023-11-24

## 2023-11-25 LAB
EKG ATRIAL RATE: 258 BPM
EKG DIAGNOSIS: NORMAL
EKG P AXIS: -87 DEGREES
EKG Q-T INTERVAL: 302 MS
EKG QRS DURATION: 168 MS
EKG QTC CALCULATION (BAZETT): 442 MS
EKG R AXIS: -42 DEGREES
EKG T AXIS: 32 DEGREES
EKG VENTRICULAR RATE: 129 BPM
FERRITIN SERPL IA-MCNC: 300.9 NG/ML (ref 30–400)
FOLATE SERPL-MCNC: 7.68 NG/ML (ref 4.78–24.2)
INR PPP: 1.07 (ref 0.84–1.16)
IRON SATN MFR SERPL: 27 % (ref 20–50)
IRON SERPL-MCNC: 65 UG/DL (ref 59–158)
PROTHROMBIN TIME: 13.9 SEC (ref 11.5–14.8)
T4 FREE SERPL-MCNC: 1.7 NG/DL (ref 0.9–1.8)
TIBC SERPL-MCNC: 237 UG/DL (ref 260–445)
TSH SERPL DL<=0.005 MIU/L-ACNC: 0.32 UIU/ML (ref 0.27–4.2)
VIT B12 SERPL-MCNC: 256 PG/ML (ref 211–911)

## 2023-11-25 PROCEDURE — 2500000003 HC RX 250 WO HCPCS: Performed by: INTERNAL MEDICINE

## 2023-11-25 PROCEDURE — 99223 1ST HOSP IP/OBS HIGH 75: CPT | Performed by: INTERNAL MEDICINE

## 2023-11-25 PROCEDURE — 6370000000 HC RX 637 (ALT 250 FOR IP): Performed by: INTERNAL MEDICINE

## 2023-11-25 PROCEDURE — 85610 PROTHROMBIN TIME: CPT

## 2023-11-25 PROCEDURE — 6360000002 HC RX W HCPCS: Performed by: INTERNAL MEDICINE

## 2023-11-25 PROCEDURE — 36415 COLL VENOUS BLD VENIPUNCTURE: CPT

## 2023-11-25 PROCEDURE — 1200000000 HC SEMI PRIVATE

## 2023-11-25 PROCEDURE — 2580000003 HC RX 258: Performed by: INTERNAL MEDICINE

## 2023-11-25 PROCEDURE — 6370000000 HC RX 637 (ALT 250 FOR IP): Performed by: NURSE PRACTITIONER

## 2023-11-25 PROCEDURE — 93010 ELECTROCARDIOGRAM REPORT: CPT | Performed by: INTERNAL MEDICINE

## 2023-11-25 RX ORDER — CYANOCOBALAMIN 1000 UG/ML
1000 INJECTION, SOLUTION INTRAMUSCULAR; SUBCUTANEOUS DAILY
Status: DISCONTINUED | OUTPATIENT
Start: 2023-11-25 | End: 2023-11-26 | Stop reason: ALTCHOICE

## 2023-11-25 RX ORDER — AMIODARONE HYDROCHLORIDE 200 MG/1
200 TABLET ORAL DAILY
Status: DISCONTINUED | OUTPATIENT
Start: 2023-11-25 | End: 2023-11-27 | Stop reason: HOSPADM

## 2023-11-25 RX ADMIN — ASPIRIN 81 MG: 81 TABLET, COATED ORAL at 08:29

## 2023-11-25 RX ADMIN — Medication 10 ML: at 08:31

## 2023-11-25 RX ADMIN — CLOPIDOGREL BISULFATE 75 MG: 75 TABLET ORAL at 08:30

## 2023-11-25 RX ADMIN — ATORVASTATIN CALCIUM 80 MG: 80 TABLET, FILM COATED ORAL at 20:01

## 2023-11-25 RX ADMIN — Medication 15 MG/HR: at 06:20

## 2023-11-25 RX ADMIN — SACUBITRIL AND VALSARTAN 1.5 TABLET: 24; 26 TABLET, FILM COATED ORAL at 08:29

## 2023-11-25 RX ADMIN — SACUBITRIL AND VALSARTAN 1.5 TABLET: 24; 26 TABLET, FILM COATED ORAL at 20:01

## 2023-11-25 RX ADMIN — ENOXAPARIN SODIUM 40 MG: 100 INJECTION SUBCUTANEOUS at 08:30

## 2023-11-25 RX ADMIN — METOPROLOL TARTRATE 2.5 MG: 5 INJECTION INTRAVENOUS at 08:30

## 2023-11-25 RX ADMIN — SPIRONOLACTONE 12.5 MG: 25 TABLET ORAL at 08:27

## 2023-11-25 RX ADMIN — METOPROLOL TARTRATE 2.5 MG: 5 INJECTION INTRAVENOUS at 15:10

## 2023-11-25 RX ADMIN — Medication 10 ML: at 20:02

## 2023-11-25 RX ADMIN — METOPROLOL TARTRATE 2.5 MG: 5 INJECTION INTRAVENOUS at 20:01

## 2023-11-25 RX ADMIN — Medication 15 MG/HR: at 17:36

## 2023-11-25 RX ADMIN — AMIODARONE HYDROCHLORIDE 200 MG: 200 TABLET ORAL at 02:50

## 2023-11-25 RX ADMIN — PANTOPRAZOLE SODIUM 40 MG: 40 TABLET, DELAYED RELEASE ORAL at 06:15

## 2023-11-25 RX ADMIN — METOPROLOL TARTRATE 2.5 MG: 5 INJECTION INTRAVENOUS at 02:03

## 2023-11-25 NOTE — PLAN OF CARE
Problem: Safety - Adult  Goal: Free from fall injury  11/25/2023 1502 by John Hamm RN  Outcome: Progressing   Pt will remain free from falls. Fall precautions in place and alarm engaged. Bedside table and call light within reach. Pt aware to use call light for assistance ambulating.      Problem: Cardiovascular - Adult  Goal: Maintains optimal cardiac output and hemodynamic stability  11/25/2023 1502 by John Hamm RN  Outcome: Progressing   VSS and monitored q15 mins

## 2023-11-25 NOTE — CARE COORDINATION
Discharge Planning Note:    Chart reviewed and it appears that patient has minimal needs for discharge at this time. Risk Score 13 %     Primary Care Physician is  Saul Espinosa MD   Primary insurance is [facility] Medicare    Please notify case management if any discharge needs are identified. Case management will continue to follow progress and update discharge plan as needed.

## 2023-11-25 NOTE — H&P
Recurrent 8/6/13 - Right occipitoparietal    Neck pain     Unspecified cerebral artery occlusion with cerebral infarction      PSHX:  has a past surgical history that includes shoulder surgery. Allergies: No Known Allergies  Fam HX:  family history includes Cancer in his mother; Clotting Disorder in his father; Diabetes in his father and sister.   Soc HX:   Social History     Socioeconomic History    Marital status:      Spouse name: None    Number of children: 5    Years of education: 11    Highest education level: None   Occupational History    Occupation: retired   Tobacco Use    Smoking status: Never    Smokeless tobacco: Never   Vaping Use    Vaping Use: Never used   Substance and Sexual Activity    Alcohol use: Not Currently    Drug use: No    Sexual activity: Yes     Partners: Female     Social Determinants of Health     Food Insecurity: No Food Insecurity (11/24/2023)    Hunger Vital Sign     Worried About Running Out of Food in the Last Year: Never true     Ran Out of Food in the Last Year: Never true    Transportation Problems ([address] [place] [address])   Housing Stability: Low Risk  (11/24/2023)    Housing Stability Vital Sign     Unable to Pay for Housing in the Last Year: No     Number of Places Lived in the Last Year: 1     Unstable Housing in the Last Year: No       Medications:   Medications:    amiodarone  200 mg Oral Daily    cyanocobalamin  1,000 mcg IntraMUSCular Daily    metoprolol  2.5 mg IntraVENous Q6H    aspirin  81 mg Oral Daily    atorvastatin  80 mg Oral Nightly    clopidogrel  75 mg Oral Daily    pantoprazole  40 mg Oral QAM AC    sacubitril-valsartan  1.5 tablet Oral BID    spironolactone  12.5 mg Oral Daily    sodium chloride flush  5-40 mL IntraVENous 2 times per day    enoxaparin  40 mg SubCUTAneous Daily      Infusions:    dilTIAZem 15 mg/hr (11/25/23 0032)    sodium chloride       PRN Meds: sodium chloride flush, 5-40 mL, PRN  sodium chloride, , PRN  ondansetron, 4 mg, Q8H PRN

## 2023-11-25 NOTE — ED NOTES
Report given to Roberts Chapel, all questions answered during handoff report, VSS at this time.      Ephraim Torres, RN  11/24/23 1933

## 2023-11-25 NOTE — ED NOTES
Report given to John HOWARD. Patient to be transported to room by nurse on monitor.      Steven Hernandez RN  11/24/23 [id number]

## 2023-11-25 NOTE — CONSULTS
[facility]   Electrophysiology Consultation   Date: 11/25/2023  Reason for Consultation: Atrial flutter  Consult Requesting Physician: Sandra Vanegas MD     Chief Complaint   Patient presents with    Tachycardia     Tachycardia onset last evening after dinner. Pt c/o feeling of heart racing, noted tachycardia on home BP cuff and BP cuff at store. Hx A. Fib. HPI: Dimple Ross is a 76 y.o. male patient of Dr. Michael Vigil with history of atrial fibrillation, CVA, HFrEF, mitral regurgitation, hypertension, status post watchman in August 2023 due to prior ICH, CAD status post PCI in March 2023, history of cardioversions, came to the hospital due to palpitations. This started having palpitation yesterday afternoon. His heart rate was elevated. It felt different than his A-fib episodes. Was found to be in atrial flutter and RVR. Past Medical History:   Diagnosis Date    Headache     Hypertension     Intracerebral hemorrhage ([address]) 5/12/2013    Recurrent 8/6/13 - Right occipitoparietal    Neck pain     Unspecified cerebral artery occlusion with cerebral infarction         Past Surgical History:   Procedure Laterality Date    SHOULDER SURGERY         No Known Allergies    Social History:  Reviewed. reports that he has never smoked. He has never used smokeless tobacco. He reports that he does not currently use alcohol. He reports that he does not use drugs. Family History:  Reviewed. family history includes Cancer in his mother; Clotting Disorder in his father; Diabetes in his father and sister. Review of System:  All other systems reviewed and are negative except for that noted above.  Pertinent negatives are:     General: negative for fever, chills   Ophthalmic ROS: negative for - eye pain or loss of vision  ENT ROS: negative for - headaches, sore throat   Respiratory: negative for - cough, sputum  Cardiovascular: Reviewed in HPI  Gastrointestinal: negative for - abdominal pain, diarrhea,

## 2023-11-25 NOTE — PLAN OF CARE
Problem: Discharge Planning  Goal: Discharge to home or other facility with appropriate resources  Outcome: Progressing  Flowsheets (Taken 11/25/2023 0343)  Discharge to home or other facility with appropriate resources:   Identify barriers to discharge with patient and caregiver   Arrange for needed discharge resources and transportation as appropriate   Identify discharge learning needs (meds, wound care, etc)   Arrange for interpreters to assist at discharge as needed   Refer to discharge planning if patient needs post-hospital services based on physician order or complex needs related to functional status, cognitive ability or social support system     Problem: Safety - Adult  Goal: Free from fall injury  Outcome: Progressing  Flowsheets (Taken 11/25/2023 0343)  Free From Fall Injury:   Instruct family/caregiver on patient safety   Based on caregiver fall risk screen, instruct family/caregiver to ask for assistance with transferring infant if caregiver noted to have fall risk factors     Problem: ABCDS Injury Assessment  Goal: Absence of physical injury  Outcome: Progressing  Flowsheets (Taken 11/25/2023 0343)  Absence of Physical Injury: Implement safety measures based on patient assessment     Problem: Cardiovascular - Adult  Goal: Maintains optimal cardiac output and hemodynamic stability  Outcome: Progressing  Flowsheets (Taken 11/25/2023 0343)  Maintains optimal cardiac output and hemodynamic stability:   Monitor blood pressure and heart rate   Monitor urine output and notify Licensed Independent Practitioner for values outside of normal range   Assess for signs of decreased cardiac output   Administer fluid and/or volume expanders as ordered   Administer vasoactive medications as ordered   For PPHN infants, administer sedation as ordered and minimize all controllable stressors.   Goal: Absence of cardiac dysrhythmias or at baseline  Outcome: Progressing  Flowsheets (Taken 11/25/2023 0343)  Absence of

## 2023-11-26 LAB
ANION GAP SERPL CALCULATED.3IONS-SCNC: 8 MMOL/L (ref 3–16)
ANISOCYTOSIS BLD QL SMEAR: ABNORMAL
BASOPHILS # BLD: 0 K/UL (ref 0–0.2)
BASOPHILS NFR BLD: 0 %
BUN SERPL-MCNC: 26 MG/DL (ref 7–20)
CALCIUM SERPL-MCNC: 8.5 MG/DL (ref 8.3–10.6)
CHLORIDE SERPL-SCNC: 105 MMOL/L (ref 99–110)
CO2 SERPL-SCNC: 27 MMOL/L (ref 21–32)
CREAT SERPL-MCNC: 1.2 MG/DL (ref 0.8–1.3)
DEPRECATED RDW RBC AUTO: 15.5 % (ref 12.4–15.4)
EOSINOPHIL # BLD: 0.3 K/UL (ref 0–0.6)
EOSINOPHIL NFR BLD: 4 %
GFR SERPLBLD CREATININE-BSD FMLA CKD-EPI: >60 ML/MIN/{1.73_M2}
GLUCOSE SERPL-MCNC: 99 MG/DL (ref 70–99)
HCT VFR BLD AUTO: 34.4 % (ref 40.5–52.5)
HGB BLD-MCNC: 11.7 G/DL (ref 13.5–17.5)
LYMPHOCYTES # BLD: 1.7 K/UL (ref 1–5.1)
LYMPHOCYTES NFR BLD: 19 %
MAGNESIUM SERPL-MCNC: 1.9 MG/DL (ref 1.8–2.4)
MCH RBC QN AUTO: 31.5 PG (ref 26–34)
MCHC RBC AUTO-ENTMCNC: 34.2 G/DL (ref 31–36)
MCV RBC AUTO: 92.2 FL (ref 80–100)
METAMYELOCYTES NFR BLD MANUAL: 3 %
MONOCYTES # BLD: 1.3 K/UL (ref 0–1.3)
MONOCYTES NFR BLD: 15 %
NEUTROPHILS # BLD: 5.4 K/UL (ref 1.7–7.7)
NEUTROPHILS NFR BLD: 59 %
PLATELET # BLD AUTO: 290 K/UL (ref 135–450)
PLATELET BLD QL SMEAR: ADEQUATE
PMV BLD AUTO: 8 FL (ref 5–10.5)
POLYCHROMASIA BLD QL SMEAR: ABNORMAL
POTASSIUM SERPL-SCNC: 3.5 MMOL/L (ref 3.5–5.1)
RBC # BLD AUTO: 3.73 M/UL (ref 4.2–5.9)
SLIDE REVIEW: ABNORMAL
SODIUM SERPL-SCNC: 140 MMOL/L (ref 136–145)
WBC # BLD AUTO: 8.7 K/UL (ref 4–11)

## 2023-11-26 PROCEDURE — 85025 COMPLETE CBC W/AUTO DIFF WBC: CPT

## 2023-11-26 PROCEDURE — 2580000003 HC RX 258: Performed by: INTERNAL MEDICINE

## 2023-11-26 PROCEDURE — 99233 SBSQ HOSP IP/OBS HIGH 50: CPT | Performed by: INTERNAL MEDICINE

## 2023-11-26 PROCEDURE — 2500000003 HC RX 250 WO HCPCS: Performed by: INTERNAL MEDICINE

## 2023-11-26 PROCEDURE — 6360000002 HC RX W HCPCS: Performed by: INTERNAL MEDICINE

## 2023-11-26 PROCEDURE — 83735 ASSAY OF MAGNESIUM: CPT

## 2023-11-26 PROCEDURE — 80048 BASIC METABOLIC PNL TOTAL CA: CPT

## 2023-11-26 PROCEDURE — 36415 COLL VENOUS BLD VENIPUNCTURE: CPT

## 2023-11-26 PROCEDURE — 6370000000 HC RX 637 (ALT 250 FOR IP): Performed by: INTERNAL MEDICINE

## 2023-11-26 PROCEDURE — 1200000000 HC SEMI PRIVATE

## 2023-11-26 PROCEDURE — 6370000000 HC RX 637 (ALT 250 FOR IP): Performed by: NURSE PRACTITIONER

## 2023-11-26 RX ORDER — METOPROLOL TARTRATE 1 MG/ML
5 INJECTION, SOLUTION INTRAVENOUS ONCE
Status: COMPLETED | OUTPATIENT
Start: 2023-11-26 | End: 2023-11-26

## 2023-11-26 RX ORDER — LANOLIN ALCOHOL/MO/W.PET/CERES
1000 CREAM (GRAM) TOPICAL DAILY
Status: DISCONTINUED | OUTPATIENT
Start: 2023-11-26 | End: 2023-11-27 | Stop reason: HOSPADM

## 2023-11-26 RX ADMIN — CLOPIDOGREL BISULFATE 75 MG: 75 TABLET ORAL at 08:41

## 2023-11-26 RX ADMIN — METOPROLOL TARTRATE 2.5 MG: 5 INJECTION INTRAVENOUS at 20:00

## 2023-11-26 RX ADMIN — SPIRONOLACTONE 12.5 MG: 25 TABLET ORAL at 08:41

## 2023-11-26 RX ADMIN — PANTOPRAZOLE SODIUM 40 MG: 40 TABLET, DELAYED RELEASE ORAL at 05:21

## 2023-11-26 RX ADMIN — METOPROLOL TARTRATE 2.5 MG: 5 INJECTION INTRAVENOUS at 14:35

## 2023-11-26 RX ADMIN — Medication 10 ML: at 08:43

## 2023-11-26 RX ADMIN — ASPIRIN 81 MG: 81 TABLET, COATED ORAL at 08:42

## 2023-11-26 RX ADMIN — Medication 15 MG/HR: at 04:24

## 2023-11-26 RX ADMIN — ATORVASTATIN CALCIUM 80 MG: 80 TABLET, FILM COATED ORAL at 20:00

## 2023-11-26 RX ADMIN — METOPROLOL TARTRATE 2.5 MG: 5 INJECTION INTRAVENOUS at 02:02

## 2023-11-26 RX ADMIN — SACUBITRIL AND VALSARTAN 1.5 TABLET: 24; 26 TABLET, FILM COATED ORAL at 20:00

## 2023-11-26 RX ADMIN — ENOXAPARIN SODIUM 40 MG: 100 INJECTION SUBCUTANEOUS at 08:43

## 2023-11-26 RX ADMIN — Medication 10 ML: at 20:01

## 2023-11-26 RX ADMIN — AMIODARONE HYDROCHLORIDE 200 MG: 200 TABLET ORAL at 08:40

## 2023-11-26 RX ADMIN — Medication 1000 MCG: at 09:10

## 2023-11-26 RX ADMIN — Medication 15 MG/HR: at 17:30

## 2023-11-26 RX ADMIN — METOPROLOL TARTRATE 5 MG: 5 INJECTION INTRAVENOUS at 08:49

## 2023-11-26 RX ADMIN — SACUBITRIL AND VALSARTAN 1.5 TABLET: 24; 26 TABLET, FILM COATED ORAL at 08:41

## 2023-11-26 NOTE — PLAN OF CARE
Problem: Discharge Planning  Goal: Discharge to home or other facility with appropriate resources  Outcome: Progressing  Flowsheets (Taken 11/26/2023 0046)  Discharge to home or other facility with appropriate resources:   Identify barriers to discharge with patient and caregiver   Arrange for needed discharge resources and transportation as appropriate   Identify discharge learning needs (meds, wound care, etc)   Arrange for interpreters to assist at discharge as needed   Refer to discharge planning if patient needs post-hospital services based on physician order or complex needs related to functional status, cognitive ability or social support system     Problem: Safety - Adult  Goal: Free from fall injury  11/26/2023 0046 by Jeff Omer RN  Outcome: Progressing  Flowsheets (Taken 11/26/2023 0046)  Free From Fall Injury:   Instruct family/caregiver on patient safety   Based on caregiver fall risk screen, instruct family/caregiver to ask for assistance with transferring infant if caregiver noted to have fall risk factors  11/25/2023 1502 by Emily Hernandez RN  Outcome: Progressing     Problem: ABCDS Injury Assessment  Goal: Absence of physical injury  Outcome: Progressing  Flowsheets (Taken 11/26/2023 0046)  Absence of Physical Injury: Implement safety measures based on patient assessment     Problem: Cardiovascular - Adult  Goal: Maintains optimal cardiac output and hemodynamic stability  11/26/2023 0046 by Jeff Omer RN  Outcome: Progressing  Flowsheets (Taken 11/26/2023 0046)  Maintains optimal cardiac output and hemodynamic stability:   Monitor blood pressure and heart rate   Monitor urine output and notify Licensed Independent Practitioner for values outside of normal range   Assess for signs of decreased cardiac output   Administer fluid and/or volume expanders as ordered   Administer vasoactive medications as ordered   For PPHN infants, administer sedation as ordered and minimize all controllable

## 2023-11-27 VITALS
TEMPERATURE: 97.3 F | BODY MASS INDEX: 22.97 KG/M2 | WEIGHT: 169.6 LBS | RESPIRATION RATE: 18 BRPM | DIASTOLIC BLOOD PRESSURE: 84 MMHG | OXYGEN SATURATION: 97 % | SYSTOLIC BLOOD PRESSURE: 124 MMHG | HEART RATE: 87 BPM | HEIGHT: 72 IN

## 2023-11-27 LAB
ANION GAP SERPL CALCULATED.3IONS-SCNC: 7 MMOL/L (ref 3–16)
BASOPHILS # BLD: 0 K/UL (ref 0–0.2)
BASOPHILS NFR BLD: 0 %
BUN SERPL-MCNC: 30 MG/DL (ref 7–20)
CALCIUM SERPL-MCNC: 8.1 MG/DL (ref 8.3–10.6)
CHLORIDE SERPL-SCNC: 106 MMOL/L (ref 99–110)
CO2 SERPL-SCNC: 24 MMOL/L (ref 21–32)
CREAT SERPL-MCNC: 1.2 MG/DL (ref 0.8–1.3)
DEPRECATED RDW RBC AUTO: 15.7 % (ref 12.4–15.4)
EKG ATRIAL RATE: 62 BPM
EKG DIAGNOSIS: NORMAL
EKG P AXIS: 56 DEGREES
EKG P-R INTERVAL: 168 MS
EKG Q-T INTERVAL: 462 MS
EKG QRS DURATION: 128 MS
EKG QTC CALCULATION (BAZETT): 468 MS
EKG R AXIS: -42 DEGREES
EKG T AXIS: 79 DEGREES
EKG VENTRICULAR RATE: 62 BPM
EOSINOPHIL # BLD: 0.5 K/UL (ref 0–0.6)
EOSINOPHIL NFR BLD: 5 %
GFR SERPLBLD CREATININE-BSD FMLA CKD-EPI: >60 ML/MIN/{1.73_M2}
GLUCOSE SERPL-MCNC: 117 MG/DL (ref 70–99)
HCT VFR BLD AUTO: 34.9 % (ref 40.5–52.5)
HGB BLD-MCNC: 11.8 G/DL (ref 13.5–17.5)
LYMPHOCYTES # BLD: 1.4 K/UL (ref 1–5.1)
LYMPHOCYTES NFR BLD: 13 %
MCH RBC QN AUTO: 31.2 PG (ref 26–34)
MCHC RBC AUTO-ENTMCNC: 33.6 G/DL (ref 31–36)
MCV RBC AUTO: 92.7 FL (ref 80–100)
METAMYELOCYTES NFR BLD MANUAL: 4 %
MONOCYTES # BLD: 0.9 K/UL (ref 0–1.3)
MONOCYTES NFR BLD: 9 %
MYELOCYTES NFR BLD MANUAL: 1 %
NEUTROPHILS # BLD: 7.7 K/UL (ref 1.7–7.7)
NEUTROPHILS NFR BLD: 68 %
NRBC BLD-RTO: 1 /100 WBC
OVALOCYTES BLD QL SMEAR: ABNORMAL
PLATELET # BLD AUTO: 297 K/UL (ref 135–450)
PLATELET BLD QL SMEAR: ADEQUATE
PMV BLD AUTO: 8.4 FL (ref 5–10.5)
POTASSIUM SERPL-SCNC: 3.6 MMOL/L (ref 3.5–5.1)
RBC # BLD AUTO: 3.77 M/UL (ref 4.2–5.9)
SLIDE REVIEW: ABNORMAL
SODIUM SERPL-SCNC: 137 MMOL/L (ref 136–145)
WBC # BLD AUTO: 10.5 K/UL (ref 4–11)

## 2023-11-27 PROCEDURE — 6370000000 HC RX 637 (ALT 250 FOR IP): Performed by: INTERNAL MEDICINE

## 2023-11-27 PROCEDURE — 93005 ELECTROCARDIOGRAM TRACING: CPT | Performed by: INTERNAL MEDICINE

## 2023-11-27 PROCEDURE — 6370000000 HC RX 637 (ALT 250 FOR IP): Performed by: NURSE PRACTITIONER

## 2023-11-27 PROCEDURE — 99152 MOD SED SAME PHYS/QHP 5/>YRS: CPT | Performed by: INTERNAL MEDICINE

## 2023-11-27 PROCEDURE — 93010 ELECTROCARDIOGRAM REPORT: CPT | Performed by: INTERNAL MEDICINE

## 2023-11-27 PROCEDURE — 92960 CARDIOVERSION ELECTRIC EXT: CPT | Performed by: INTERNAL MEDICINE

## 2023-11-27 PROCEDURE — 85025 COMPLETE CBC W/AUTO DIFF WBC: CPT

## 2023-11-27 PROCEDURE — 5A2204Z RESTORATION OF CARDIAC RHYTHM, SINGLE: ICD-10-PCS | Performed by: INTERNAL MEDICINE

## 2023-11-27 PROCEDURE — 80048 BASIC METABOLIC PNL TOTAL CA: CPT

## 2023-11-27 PROCEDURE — 2500000003 HC RX 250 WO HCPCS: Performed by: INTERNAL MEDICINE

## 2023-11-27 PROCEDURE — 2580000003 HC RX 258: Performed by: INTERNAL MEDICINE

## 2023-11-27 PROCEDURE — 92960 CARDIOVERSION ELECTRIC EXT: CPT

## 2023-11-27 PROCEDURE — 36415 COLL VENOUS BLD VENIPUNCTURE: CPT

## 2023-11-27 PROCEDURE — 7100000010 HC PHASE II RECOVERY - FIRST 15 MIN

## 2023-11-27 PROCEDURE — 2500000003 HC RX 250 WO HCPCS

## 2023-11-27 RX ORDER — METOPROLOL SUCCINATE 50 MG/1
50 TABLET, EXTENDED RELEASE ORAL DAILY
Status: DISCONTINUED | OUTPATIENT
Start: 2023-11-27 | End: 2023-11-27 | Stop reason: HOSPADM

## 2023-11-27 RX ADMIN — METOPROLOL TARTRATE 2.5 MG: 5 INJECTION INTRAVENOUS at 02:50

## 2023-11-27 RX ADMIN — AMIODARONE HYDROCHLORIDE 200 MG: 200 TABLET ORAL at 08:34

## 2023-11-27 RX ADMIN — Medication 10 ML: at 08:34

## 2023-11-27 RX ADMIN — ASPIRIN 81 MG: 81 TABLET, COATED ORAL at 08:34

## 2023-11-27 RX ADMIN — PANTOPRAZOLE SODIUM 40 MG: 40 TABLET, DELAYED RELEASE ORAL at 05:15

## 2023-11-27 RX ADMIN — CLOPIDOGREL BISULFATE 75 MG: 75 TABLET ORAL at 08:34

## 2023-11-27 RX ADMIN — Medication 1000 MCG: at 10:23

## 2023-11-27 RX ADMIN — SACUBITRIL AND VALSARTAN 1.5 TABLET: 24; 26 TABLET, FILM COATED ORAL at 08:37

## 2023-11-27 RX ADMIN — METOPROLOL SUCCINATE 50 MG: 50 TABLET, EXTENDED RELEASE ORAL at 10:23

## 2023-11-27 NOTE — CARE COORDINATION
11/27/23 1138   IMM Letter   IMM Letter given to Patient/Family/Significant other/Guardian/POA/by: Second IMM letter given to patient by CM   IMM Letter date given: 11/27/23   IMM Letter time given: 36

## 2023-11-27 NOTE — PROCEDURES
[address] [facility]     Electrophysiology Procedure Note       Date of Procedure: 11/27/2023  Patient's Name: Efra Beauchamp  YOB: 1955   Medical Record Number: [id number]  Procedure Performed by: Elan Velez MD    Procedures performed:    External Electrical cardioversion   IV sedation. Medication: Brevital Sodium   Sedation medication was given by physician    An independent trained observer assisted in the monitoring of the patient's level of consciousness and physiological status including vital signs. Indication of the procedure: Persistent atrial flutter     Details of procedure: The patient was brought to the cath lab area in a fasting and non-sedated state. The risks, benefits and alternatives of the procedure were discussed with the patient. The patient opted to proceed with the procedure. Written informed consent was signed and placed in the chart. A timeout protocol was completed to identify the patient and the procedure being performed. Then we used brevital for sedation. 70 mg Brevital was given by me as one dose, and electrical DC cardioversion was perfomred using 200J, synchronized shock. Patient was converted to sinus rhythm. The patient tolerated the procedure well and there were no complications. Conclusion:   Successful external DC cardioversion of atrial flutter.

## 2023-11-27 NOTE — PLAN OF CARE
Problem: Safety - Adult  Goal: Free from fall injury  Outcome: Progressing   Pt will remain free from falls. Fall precautions in place. Problem: Cardiovascular - Adult  Goal: Maintains optimal cardiac output and hemodynamic stability  Outcome: Progressing    Bedside table and call light within reach. Pt aware to use call light for assistance ambulating. VSS and monitored hourly  Problem: Cardiovascular - Adult  Goal: Absence of cardiac dysrhythmias or at baseline  Outcome: Progressing   Pt in A-flutter and on telemetry monitor.  MD aware

## 2023-11-28 ENCOUNTER — TELEPHONE (OUTPATIENT)
Dept: CARDIOLOGY CLINIC | Age: 68
End: 2023-11-28

## 2023-11-28 NOTE — TELEPHONE ENCOUNTER
Pt needs to know what forms/letters he needs to fill out for help with the cost of Jardiance and Liptor.

## 2023-11-28 NOTE — TELEPHONE ENCOUNTER
I spoke with pt, he states that he has forms for atorvastatin as he has gotten it before. I pulled forms for him for Jardiance as well as other resources for pt assistance. Pt states he is going to be coming this way today and will  the forms. Pt also states that he is very pleased with the staff here and everyone has been very helpful.

## 2023-11-30 RX ORDER — AMIODARONE HYDROCHLORIDE 200 MG/1
200 TABLET ORAL DAILY
Qty: 90 TABLET | Refills: 1 | Status: SHIPPED | OUTPATIENT
Start: 2023-11-30

## 2023-11-30 NOTE — TELEPHONE ENCOUNTER
Last OV : 8/30/23 NPSR    Last EKG : 11/27/23    Last Refill : 9/25/23 90w1     Next OV : 2/15/24 NPSR

## 2023-12-06 ENCOUNTER — TELEPHONE (OUTPATIENT)
Dept: CARDIOLOGY CLINIC | Age: 68
End: 2023-12-06

## 2023-12-06 NOTE — TELEPHONE ENCOUNTER
Pt is asking if the paperwork he dropped off for Lipitor financial assistance has been filled out. Please call to discuss.

## 2023-12-11 ENCOUNTER — TELEPHONE (OUTPATIENT)
Dept: CARDIOLOGY CLINIC | Age: 68
End: 2023-12-11

## 2023-12-11 NOTE — TELEPHONE ENCOUNTER
Medication Samples    Medication:  sacubitril-valsartan (ENTRESTO)   Dosage of the medication:  24-26 MG per tablet   How are you taking this medication (QD, BID, TID, QID, PRN):  2 days   in the office or Mail to your home? He can pick them up here please call and let him know. If we cannot supply samples, he will need a short term supply filled info below please advise if we send scrip over:    Medication Refill  Medication needing refilled:  sacubitril-valsartan (ENTRESTO)   Dosage of the medication:  24-26 MG per tablet   How are you taking this medication (QD, BID, TID, QID, PRN):  Take 1.5 tablets by mouth 2 times daily   30 or 90 day supply called in:  5 days short term supply to bridge the time between when he gets it from his mail order.   When will you run out of your medication:  2 days  Which Pharmacy are we sending the medication to?:  [facility] [address] [facility], [address]

## 2023-12-20 ENCOUNTER — TELEPHONE (OUTPATIENT)
Dept: CARDIOLOGY CLINIC | Age: 68
End: 2023-12-20

## 2023-12-20 NOTE — TELEPHONE ENCOUNTER
LVM for patient I have no dates currently and I will be in touch shortly to get him scheduled.     Procedure -  ARAMIS/Afib Abl   Date:  Arrival time:   Procedure time:     Patient Instructions  Dx: atrial flutter   ICD-10 code: i48.92     The Cath Lab will call you a week before your procedure.      The day of your procedure you will need to check in at the registration desk, which is in the main lobby at [facility].     PRE-PROCEDURE  INSTRUCTIONS              Do not eat or drink after midnight the night prior to procedure [x] Yes [] No               You  do not need to hold your plavix               You may take all other medications with a sip of water the morning of your procedure.              Please have a responsible adult to drive you home upon discharge.              The discharging unit will be giving you discharge instructions.     If you have any questions regarding your procedure itself or your medications, please call [phone] and ask to talk to an EP nurse.     You will be seen in the office in 1 week for a wound check and then 3 months following implantation.

## 2024-01-02 ENCOUNTER — TELEPHONE (OUTPATIENT)
Dept: CARDIOLOGY CLINIC | Age: 69
End: 2024-01-02

## 2024-01-02 RX ORDER — SPIRONOLACTONE 25 MG/1
25 TABLET ORAL DAILY
Qty: 90 TABLET | Refills: 3 | Status: SHIPPED | OUTPATIENT
Start: 2024-01-02

## 2024-01-02 NOTE — TELEPHONE ENCOUNTER
Called and spoke to [employer] and the patient has been approved and they will contact him to let him know that his RX will be filled and shipped out. Called and spoke to the patient and gave him message and told him to  his samples anytime after lunch.

## 2024-01-02 NOTE — TELEPHONE ENCOUNTER
Pt states he is out of Jardiance 12/30 and is asking for meds until his paperwork is approved. PT states he dropped paperwork off a couple weeks ago. Alyse bellae.

## 2024-01-02 NOTE — TELEPHONE ENCOUNTER
Patient was approved through 12/31/24 . Please contact [employer] to follow up on shipping of medication . May provide patient with 2 weeks worth of samples     [phone]

## 2024-01-15 ENCOUNTER — TELEPHONE (OUTPATIENT)
Dept: CARDIOLOGY CLINIC | Age: 69
End: 2024-01-15

## 2024-01-15 NOTE — TELEPHONE ENCOUNTER
Please contact [employer] to determine what the prescriber office needs to do for the patient .     Thanks

## 2024-01-15 NOTE — TELEPHONE ENCOUNTER
Pt called the number for [employer] and they told him they did not have anything scheduled to send out to him.  He is not sure what to do about getting the script for Jardiance to them for them to fill.    Please advise.

## 2024-01-15 NOTE — TELEPHONE ENCOUNTER
Spoke with patient, he was notified the patient assistance for Jardiance was approved but hasn't received it yet. Gave patient [phone] number to follow up to follow up.     Patient only has two days left of Jardiance, can we give him samples?

## 2024-01-15 NOTE — TELEPHONE ENCOUNTER
LMOM for pt that there would be samples for him at the   per NPSR. Advised to call back with questions or concerns.

## 2024-01-16 ENCOUNTER — TELEPHONE (OUTPATIENT)
Dept: CARDIOLOGY CLINIC | Age: 69
End: 2024-01-16

## 2024-01-16 NOTE — TELEPHONE ENCOUNTER
Pt is asking if we will let him know when we find out when [employer] will be shipping his Jardiance.    Please advise.

## 2024-01-16 NOTE — PROGRESS NOTES
[facility]   Electrophysiology Follow up     Date: 2/15/2024  I had the privilege of visiting Samuel Mandujano in the office.     CC: Follow up  HPI: Samuel Mandujano is a 68 y.o. male with a PMH of atrial fibrillation, CVA, HFrEF, Mitral regurgitation and HTN. Hx of right parietal parenchymal bleed x2 in 2013     In March of 2023, pt presented to the hospital with shortness of breath and hemoptysis.  He started having shortness of breath about a week prior.  He was found to be in atrial fibrillation with rapid ventricular response. On 3/3/23, he underwent successful PCI to critical mid RCA with one drug eluting stent. Followed by staged PCI to RCA/OM1/OM2LAD on 3/6/2023. Failed DCCV 3/7/2023. He was loaded with amiodarone and underwent successful DCCV 4/6/2023.  S/p watchman (8/14/2023). He had urinary retention following with urinary catheter placement in ED 8/15/2023. He had recurrent AF s/p DCCV (11/27/23)    -Interval history: Today, Samuel Mandujano is being seen for routine follow up. He is doing fair. He states he has episodes of \"numbness\" over his whole body. He had a fall, but no significant injury. He is scheduled for an ablation on March 1st.     Denies having chest pain, palpitations, shortness of breath, orthopnea/PND, cough, or dizziness at the time of this visit.    With regard to medication therapy the patient has been compliant with prescribed regimen. They have tolerated therapy to date.     Assessment:     1.Paroxysmal Atrial Fibrillation/Flutter  - Hx of DCCV (4/6/23)  - S/p CHENCHO closure with Watchman (8/14/23)  ~ On DAPT. Ok to stop plavix from EP standpoint. Will discuss with Dr. Sandoval given his PCI last March    - Currently in   - Continue Toprol XL 50 mg QD  - Due to CAD, anti-arrhythmic therapies are limited to amiodarone. I have discussed the side effects of amiodarone with patient, including but not limited to thyroid disease, liver dysfunction, discoloration of skin and

## 2024-01-16 NOTE — TELEPHONE ENCOUNTER
I was actually calling the wrong drug company for the Jardiance, it should be [employer].  Looking for patient assistance paperwork that was dropped off by patient.      Paperwork will be faxed today.

## 2024-01-16 NOTE — TELEPHONE ENCOUNTER
I called [facility] to follow up regarding patient's shipment of Jardiance @ [phone].  I was on hold for over 30 minutes so I will try again later

## 2024-01-16 NOTE — TELEPHONE ENCOUNTER
Pt is in need of assistance with Jardiance. He states that he dropped off the forms to [employer] last month.  Will look in the scanning folder for the form.      [facility]  Patient Assistance Program    [address], [place] [address]  Phone: [phone]  Hours: M-F, 8:30a - 6:00p ET  Fax: [phone]

## 2024-01-17 NOTE — TELEPHONE ENCOUNTER
Spoke with the pt and he is not avail tomorrow for procedure.  I will touch back end of the week or early next to get him scheduled.

## 2024-01-17 NOTE — TELEPHONE ENCOUNTER
The form was faxed to [employer] and scanned into [employer] yesterday. Spoke with Mary to verify that they received the form and she verified that she did receive it and a shipment is set to go out tomorrow to the pts home address.   Pt notified and verbalized understanding.  He was very thankful for our help.

## 2024-01-22 NOTE — TELEPHONE ENCOUNTER
Pt called stating they are returning call to schedule procedure and they are wanting see how soon it will get scheduled?    Pls advise thank you

## 2024-01-23 NOTE — TELEPHONE ENCOUNTER
Spoke with the pt and got him scheduled for procedure. We went over instructions below and he verbalized understanding.     Procedure -  ARAMIS/Afib Abl   Date: 3/1/24  Arrival time: 9:30 am  Procedure time: 10:30 am     Patient Instructions  Dx: atrial flutter   ICD-10 code: i48.92     The Cath Lab will call you a week before your procedure.      The day of your procedure you will need to check in at the registration desk, which is in the main lobby at [facility].     PRE-PROCEDURE  INSTRUCTIONS              Do not eat or drink after midnight the night prior to procedure [x] Yes [] No               You  do not need to hold your plavix               You may take all other medications with a sip of water the morning of your procedure.              Please have a responsible adult to drive you home upon discharge.              The discharging unit will be giving you discharge instructions.     If you have any questions regarding your procedure itself or your medications, please call [phone] and ask to talk to an EP nurse.     You will be seen in the office in 1 week for a wound check and then 3 months following implantation.      Qgenda updated / Added in Epic & Carto / emailed cath lab

## 2024-02-15 ENCOUNTER — OFFICE VISIT (OUTPATIENT)
Dept: CARDIOLOGY CLINIC | Age: 69
End: 2024-02-15
Payer: MEDICARE

## 2024-02-15 VITALS
OXYGEN SATURATION: 98 % | WEIGHT: 166.8 LBS | DIASTOLIC BLOOD PRESSURE: 74 MMHG | BODY MASS INDEX: 22.59 KG/M2 | HEIGHT: 72 IN | SYSTOLIC BLOOD PRESSURE: 118 MMHG | HEART RATE: 54 BPM

## 2024-02-15 DIAGNOSIS — I48.0 PAF (PAROXYSMAL ATRIAL FIBRILLATION) (HCC): Primary | ICD-10-CM

## 2024-02-15 DIAGNOSIS — I61.9 CVA (CEREBROVASCULAR ACCIDENT DUE TO INTRACEREBRAL HEMORRHAGE) (HCC): ICD-10-CM

## 2024-02-15 DIAGNOSIS — I50.20 HFREF (HEART FAILURE WITH REDUCED EJECTION FRACTION) (HCC): ICD-10-CM

## 2024-02-15 DIAGNOSIS — Z86.73 HISTORY OF CVA (CEREBROVASCULAR ACCIDENT): ICD-10-CM

## 2024-02-15 DIAGNOSIS — I10 ESSENTIAL HYPERTENSION: ICD-10-CM

## 2024-02-15 DIAGNOSIS — I25.10 CORONARY ARTERY DISEASE INVOLVING NATIVE CORONARY ARTERY OF NATIVE HEART WITHOUT ANGINA PECTORIS: ICD-10-CM

## 2024-02-15 DIAGNOSIS — I25.5 ISCHEMIC CARDIOMYOPATHY: ICD-10-CM

## 2024-02-15 PROCEDURE — 1123F ACP DISCUSS/DSCN MKR DOCD: CPT | Performed by: NURSE PRACTITIONER

## 2024-02-15 PROCEDURE — 3078F DIAST BP <80 MM HG: CPT | Performed by: NURSE PRACTITIONER

## 2024-02-15 PROCEDURE — 3074F SYST BP LT 130 MM HG: CPT | Performed by: NURSE PRACTITIONER

## 2024-02-15 PROCEDURE — 93000 ELECTROCARDIOGRAM COMPLETE: CPT | Performed by: NURSE PRACTITIONER

## 2024-02-15 PROCEDURE — 99214 OFFICE O/P EST MOD 30 MIN: CPT | Performed by: NURSE PRACTITIONER

## 2024-02-21 ENCOUNTER — TELEPHONE (OUTPATIENT)
Dept: CARDIOLOGY CLINIC | Age: 69
End: 2024-02-21

## 2024-02-21 RX ORDER — METOPROLOL SUCCINATE 50 MG/1
50 TABLET, EXTENDED RELEASE ORAL 2 TIMES DAILY
Qty: 200 TABLET | Refills: 2 | Status: SHIPPED | OUTPATIENT
Start: 2024-02-21

## 2024-02-21 RX ORDER — AMIODARONE HYDROCHLORIDE 200 MG/1
200 TABLET ORAL DAILY
Qty: 100 TABLET | Refills: 2 | Status: SHIPPED | OUTPATIENT
Start: 2024-02-21

## 2024-02-21 NOTE — TELEPHONE ENCOUNTER
Last ov:2/15/24 NPSR  Next ov:  Last EK/15/24  Last labs:23  Last filled:   Disp Refills Start End    amiodarone (CORDARONE) 200 MG tablet 90 tablet 1 2023 --    Sig - Route: TAKE 1 TABLET BY MOUTH DAILY - Oral    Sent to pharmacy as: Amiodarone HCl 200 MG Oral Tablet (CORDARONE)    Cosign for Ordering: Accepted by Kory Ross APRN - CNP on 2023 10:56 AM    E-Prescribing Status: Receipt confirmed by pharmacy (2023 10:52 AM EST)

## 2024-02-21 NOTE — TELEPHONE ENCOUNTER
Received refill request for Metoprolol from [facility] pharmacy.    Last ov:02/15/2024 NPSR    Last EK/15/2024    Last Refill:2023    Next appointment:None

## 2024-02-21 NOTE — TELEPHONE ENCOUNTER
Please get patient a follow up with dkw if possible March/April, thanks        ---- Message from GOLDEN Ram - CNP sent at 2/15/2024 10:40 AM EST -----  When does he need follow up with you? Also from EP standpoint, he can stop plavix, but had stent last March so up to you if he can stop for that

## 2024-03-01 ENCOUNTER — ANESTHESIA EVENT (OUTPATIENT)
Dept: CARDIAC CATH/INVASIVE PROCEDURES | Age: 69
End: 2024-03-01
Payer: MEDICARE

## 2024-03-01 ENCOUNTER — HOSPITAL ENCOUNTER (OUTPATIENT)
Dept: CARDIAC CATH/INVASIVE PROCEDURES | Age: 69
Discharge: HOME | End: 2024-03-01
Attending: INTERNAL MEDICINE | Admitting: INTERNAL MEDICINE
Payer: MEDICARE

## 2024-03-01 ENCOUNTER — ANESTHESIA (OUTPATIENT)
Dept: CARDIAC CATH/INVASIVE PROCEDURES | Age: 69
End: 2024-03-01
Payer: MEDICARE

## 2024-03-01 ENCOUNTER — TELEPHONE (OUTPATIENT)
Dept: CARDIOLOGY CLINIC | Age: 69
End: 2024-03-01

## 2024-03-01 ENCOUNTER — HOSPITAL ENCOUNTER (OUTPATIENT)
Dept: NON INVASIVE DIAGNOSTICS | Age: 69
Discharge: HOME | End: 2024-03-01

## 2024-03-01 VITALS
DIASTOLIC BLOOD PRESSURE: 89 MMHG | BODY MASS INDEX: 22.89 KG/M2 | WEIGHT: 169 LBS | HEIGHT: 72 IN | HEART RATE: 58 BPM | SYSTOLIC BLOOD PRESSURE: 168 MMHG | OXYGEN SATURATION: 99 % | RESPIRATION RATE: 12 BRPM | TEMPERATURE: 97 F

## 2024-03-01 PROBLEM — I48.3 TYPICAL ATRIAL FLUTTER (HCC): Status: ACTIVE | Noted: 2024-03-01

## 2024-03-01 LAB
ABO + RH BLD: NORMAL
ANION GAP SERPL CALCULATED.3IONS-SCNC: 10 MMOL/L (ref 3–16)
BLD GP AB SCN SERPL QL: NORMAL
BUN SERPL-MCNC: 17 MG/DL (ref 7–20)
CALCIUM SERPL-MCNC: 8.9 MG/DL (ref 8.3–10.6)
CHLORIDE SERPL-SCNC: 105 MMOL/L (ref 99–110)
CO2 SERPL-SCNC: 23 MMOL/L (ref 21–32)
CREAT SERPL-MCNC: 1.2 MG/DL (ref 0.8–1.3)
DEPRECATED RDW RBC AUTO: 13.7 % (ref 12.4–15.4)
EKG ATRIAL RATE: 51 BPM
EKG DIAGNOSIS: NORMAL
EKG P AXIS: 45 DEGREES
EKG P-R INTERVAL: 184 MS
EKG Q-T INTERVAL: 490 MS
EKG QRS DURATION: 126 MS
EKG QTC CALCULATION (BAZETT): 451 MS
EKG R AXIS: -28 DEGREES
EKG T AXIS: 59 DEGREES
EKG VENTRICULAR RATE: 51 BPM
GFR SERPLBLD CREATININE-BSD FMLA CKD-EPI: >60 ML/MIN/{1.73_M2}
GLUCOSE SERPL-MCNC: 101 MG/DL (ref 70–99)
HCT VFR BLD AUTO: 39.1 % (ref 40.5–52.5)
HGB BLD-MCNC: 12.9 G/DL (ref 13.5–17.5)
MCH RBC QN AUTO: 29.7 PG (ref 26–34)
MCHC RBC AUTO-ENTMCNC: 32.9 G/DL (ref 31–36)
MCV RBC AUTO: 90.3 FL (ref 80–100)
PLATELET # BLD AUTO: 201 K/UL (ref 135–450)
PMV BLD AUTO: 9.7 FL (ref 5–10.5)
POC ACT LR: 254 SEC
POC ACT LR: 266 SEC
POC ACT LR: 317 SEC
POC ACT LR: 326 SEC
POTASSIUM SERPL-SCNC: 3.9 MMOL/L (ref 3.5–5.1)
RBC # BLD AUTO: 4.33 M/UL (ref 4.2–5.9)
SODIUM SERPL-SCNC: 138 MMOL/L (ref 136–145)
WBC # BLD AUTO: 7.9 K/UL (ref 4–11)

## 2024-03-01 PROCEDURE — 2500000003 HC RX 250 WO HCPCS: Performed by: NURSE ANESTHETIST, CERTIFIED REGISTERED

## 2024-03-01 PROCEDURE — 7100000011 HC PHASE II RECOVERY - ADDTL 15 MIN: Performed by: ANESTHESIOLOGY

## 2024-03-01 PROCEDURE — 93010 ELECTROCARDIOGRAM REPORT: CPT | Performed by: INTERNAL MEDICINE

## 2024-03-01 PROCEDURE — 93623 PRGRMD STIMJ&PACG IV RX NFS: CPT | Performed by: INTERNAL MEDICINE

## 2024-03-01 PROCEDURE — 86900 BLOOD TYPING SEROLOGIC ABO: CPT

## 2024-03-01 PROCEDURE — 93656 COMPRE EP EVAL ABLTJ ATR FIB: CPT | Performed by: INTERNAL MEDICINE

## 2024-03-01 PROCEDURE — 7100000001 HC PACU RECOVERY - ADDTL 15 MIN: Performed by: ANESTHESIOLOGY

## 2024-03-01 PROCEDURE — C1769 GUIDE WIRE: HCPCS

## 2024-03-01 PROCEDURE — 93005 ELECTROCARDIOGRAM TRACING: CPT | Performed by: INTERNAL MEDICINE

## 2024-03-01 PROCEDURE — 80048 BASIC METABOLIC PNL TOTAL CA: CPT

## 2024-03-01 PROCEDURE — 2580000003 HC RX 258: Performed by: NURSE ANESTHETIST, CERTIFIED REGISTERED

## 2024-03-01 PROCEDURE — C1759 CATH, INTRA ECHOCARDIOGRAPHY: HCPCS

## 2024-03-01 PROCEDURE — C1766 INTRO/SHEATH,STRBLE,NON-PEEL: HCPCS

## 2024-03-01 PROCEDURE — 2500000003 HC RX 250 WO HCPCS

## 2024-03-01 PROCEDURE — C1732 CATH, EP, DIAG/ABL, 3D/VECT: HCPCS

## 2024-03-01 PROCEDURE — 7100000010 HC PHASE II RECOVERY - FIRST 15 MIN: Performed by: ANESTHESIOLOGY

## 2024-03-01 PROCEDURE — 93622 COMP EP EVAL L VENTR PAC&REC: CPT | Performed by: INTERNAL MEDICINE

## 2024-03-01 PROCEDURE — 93312 ECHO TRANSESOPHAGEAL: CPT

## 2024-03-01 PROCEDURE — 86850 RBC ANTIBODY SCREEN: CPT

## 2024-03-01 PROCEDURE — 3700000001 HC ADD 15 MINUTES (ANESTHESIA): Performed by: ANESTHESIOLOGY

## 2024-03-01 PROCEDURE — 6360000002 HC RX W HCPCS

## 2024-03-01 PROCEDURE — C1893 INTRO/SHEATH, FIXED,NON-PEEL: HCPCS

## 2024-03-01 PROCEDURE — 7100000000 HC PACU RECOVERY - FIRST 15 MIN: Performed by: ANESTHESIOLOGY

## 2024-03-01 PROCEDURE — 2580000003 HC RX 258

## 2024-03-01 PROCEDURE — C1760 CLOSURE DEV, VASC: HCPCS

## 2024-03-01 PROCEDURE — 36415 COLL VENOUS BLD VENIPUNCTURE: CPT

## 2024-03-01 PROCEDURE — 93655 ICAR CATH ABLTJ DSCRT ARRHYT: CPT

## 2024-03-01 PROCEDURE — 85347 COAGULATION TIME ACTIVATED: CPT

## 2024-03-01 PROCEDURE — 85027 COMPLETE CBC AUTOMATED: CPT

## 2024-03-01 PROCEDURE — 93655 ICAR CATH ABLTJ DSCRT ARRHYT: CPT | Performed by: INTERNAL MEDICINE

## 2024-03-01 PROCEDURE — 93622 COMP EP EVAL L VENTR PAC&REC: CPT

## 2024-03-01 PROCEDURE — 2720000010 HC SURG SUPPLY STERILE

## 2024-03-01 PROCEDURE — 3700000000 HC ANESTHESIA ATTENDED CARE: Performed by: ANESTHESIOLOGY

## 2024-03-01 PROCEDURE — 86901 BLOOD TYPING SEROLOGIC RH(D): CPT

## 2024-03-01 PROCEDURE — C1894 INTRO/SHEATH, NON-LASER: HCPCS

## 2024-03-01 PROCEDURE — 93623 PRGRMD STIMJ&PACG IV RX NFS: CPT

## 2024-03-01 PROCEDURE — 93656 COMPRE EP EVAL ABLTJ ATR FIB: CPT

## 2024-03-01 PROCEDURE — 6360000002 HC RX W HCPCS: Performed by: NURSE ANESTHETIST, CERTIFIED REGISTERED

## 2024-03-01 RX ORDER — PROTAMINE SULFATE 10 MG/ML
INJECTION, SOLUTION INTRAVENOUS PRN
Status: DISCONTINUED | OUTPATIENT
Start: 2024-03-01 | End: 2024-03-01 | Stop reason: SDUPTHER

## 2024-03-01 RX ORDER — SODIUM CHLORIDE 0.9 % (FLUSH) 0.9 %
5-40 SYRINGE (ML) INJECTION EVERY 12 HOURS SCHEDULED
Status: DISCONTINUED | OUTPATIENT
Start: 2024-03-01 | End: 2024-03-01 | Stop reason: HOSPADM

## 2024-03-01 RX ORDER — MAGNESIUM SULFATE HEPTAHYDRATE 500 MG/ML
INJECTION, SOLUTION INTRAMUSCULAR; INTRAVENOUS PRN
Status: DISCONTINUED | OUTPATIENT
Start: 2024-03-01 | End: 2024-03-01 | Stop reason: SDUPTHER

## 2024-03-01 RX ORDER — SODIUM CHLORIDE 9 MG/ML
INJECTION, SOLUTION INTRAVENOUS PRN
Status: DISCONTINUED | OUTPATIENT
Start: 2024-03-01 | End: 2024-03-01 | Stop reason: HOSPADM

## 2024-03-01 RX ORDER — ROCURONIUM BROMIDE 10 MG/ML
INJECTION, SOLUTION INTRAVENOUS PRN
Status: DISCONTINUED | OUTPATIENT
Start: 2024-03-01 | End: 2024-03-01 | Stop reason: SDUPTHER

## 2024-03-01 RX ORDER — ONDANSETRON 2 MG/ML
INJECTION INTRAMUSCULAR; INTRAVENOUS PRN
Status: DISCONTINUED | OUTPATIENT
Start: 2024-03-01 | End: 2024-03-01 | Stop reason: SDUPTHER

## 2024-03-01 RX ORDER — SODIUM CHLORIDE 0.9 % (FLUSH) 0.9 %
5-40 SYRINGE (ML) INJECTION PRN
Status: DISCONTINUED | OUTPATIENT
Start: 2024-03-01 | End: 2024-03-01 | Stop reason: HOSPADM

## 2024-03-01 RX ORDER — FENTANYL CITRATE 50 UG/ML
INJECTION, SOLUTION INTRAMUSCULAR; INTRAVENOUS PRN
Status: DISCONTINUED | OUTPATIENT
Start: 2024-03-01 | End: 2024-03-01 | Stop reason: SDUPTHER

## 2024-03-01 RX ORDER — SODIUM CHLORIDE 9 MG/ML
INJECTION, SOLUTION INTRAVENOUS CONTINUOUS PRN
Status: DISCONTINUED | OUTPATIENT
Start: 2024-03-01 | End: 2024-03-01 | Stop reason: SDUPTHER

## 2024-03-01 RX ORDER — LIDOCAINE HYDROCHLORIDE 20 MG/ML
INJECTION, SOLUTION EPIDURAL; INFILTRATION; INTRACAUDAL; PERINEURAL PRN
Status: DISCONTINUED | OUTPATIENT
Start: 2024-03-01 | End: 2024-03-01 | Stop reason: SDUPTHER

## 2024-03-01 RX ORDER — DEXAMETHASONE SODIUM PHOSPHATE 4 MG/ML
INJECTION, SOLUTION INTRA-ARTICULAR; INTRALESIONAL; INTRAMUSCULAR; INTRAVENOUS; SOFT TISSUE PRN
Status: DISCONTINUED | OUTPATIENT
Start: 2024-03-01 | End: 2024-03-01 | Stop reason: SDUPTHER

## 2024-03-01 RX ORDER — PROPOFOL 10 MG/ML
INJECTION, EMULSION INTRAVENOUS PRN
Status: DISCONTINUED | OUTPATIENT
Start: 2024-03-01 | End: 2024-03-01 | Stop reason: SDUPTHER

## 2024-03-01 RX ORDER — HEPARIN SODIUM 1000 [USP'U]/ML
INJECTION, SOLUTION INTRAVENOUS; SUBCUTANEOUS PRN
Status: DISCONTINUED | OUTPATIENT
Start: 2024-03-01 | End: 2024-03-01 | Stop reason: SDUPTHER

## 2024-03-01 RX ORDER — KETAMINE HCL IN NACL, ISO-OSM 100MG/10ML
SYRINGE (ML) INJECTION PRN
Status: DISCONTINUED | OUTPATIENT
Start: 2024-03-01 | End: 2024-03-01 | Stop reason: SDUPTHER

## 2024-03-01 RX ORDER — GLYCOPYRROLATE 0.2 MG/ML
INJECTION INTRAMUSCULAR; INTRAVENOUS PRN
Status: DISCONTINUED | OUTPATIENT
Start: 2024-03-01 | End: 2024-03-01 | Stop reason: SDUPTHER

## 2024-03-01 RX ORDER — ACETAMINOPHEN 325 MG/1
650 TABLET ORAL EVERY 4 HOURS PRN
Status: DISCONTINUED | OUTPATIENT
Start: 2024-03-01 | End: 2024-03-01 | Stop reason: HOSPADM

## 2024-03-01 RX ORDER — DEXMEDETOMIDINE HYDROCHLORIDE 100 UG/ML
INJECTION, SOLUTION INTRAVENOUS PRN
Status: DISCONTINUED | OUTPATIENT
Start: 2024-03-01 | End: 2024-03-01 | Stop reason: SDUPTHER

## 2024-03-01 RX ADMIN — HEPARIN SODIUM 5000 UNITS: 1000 INJECTION INTRAVENOUS; SUBCUTANEOUS at 13:21

## 2024-03-01 RX ADMIN — ONDANSETRON 4 MG: 2 INJECTION INTRAMUSCULAR; INTRAVENOUS at 13:53

## 2024-03-01 RX ADMIN — SUGAMMADEX 200 MG: 100 INJECTION, SOLUTION INTRAVENOUS at 13:53

## 2024-03-01 RX ADMIN — GLYCOPYRROLATE 0.2 MG: 0.2 INJECTION, SOLUTION INTRAMUSCULAR; INTRAVENOUS at 11:44

## 2024-03-01 RX ADMIN — GLYCOPYRROLATE 0.2 MG: 0.2 INJECTION, SOLUTION INTRAMUSCULAR; INTRAVENOUS at 11:46

## 2024-03-01 RX ADMIN — DEXMEDETOMIDINE HYDROCHLORIDE 10 MCG: 100 INJECTION, SOLUTION INTRAVENOUS at 11:25

## 2024-03-01 RX ADMIN — PHENYLEPHRINE HYDROCHLORIDE 50 MCG/MIN: 10 INJECTION INTRAVENOUS at 11:25

## 2024-03-01 RX ADMIN — ROCURONIUM BROMIDE 10 MG: 10 INJECTION, SOLUTION INTRAVENOUS at 12:30

## 2024-03-01 RX ADMIN — FENTANYL CITRATE 100 MCG: 50 INJECTION, SOLUTION INTRAMUSCULAR; INTRAVENOUS at 11:25

## 2024-03-01 RX ADMIN — HEPARIN SODIUM 4000 UNITS: 1000 INJECTION INTRAVENOUS; SUBCUTANEOUS at 13:05

## 2024-03-01 RX ADMIN — Medication 20 MG: at 11:25

## 2024-03-01 RX ADMIN — MAGNESIUM SULFATE HEPTAHYDRATE 1 G: 500 INJECTION, SOLUTION INTRAMUSCULAR; INTRAVENOUS at 11:25

## 2024-03-01 RX ADMIN — HEPARIN SODIUM 2000 UNITS: 1000 INJECTION INTRAVENOUS; SUBCUTANEOUS at 13:34

## 2024-03-01 RX ADMIN — PROPOFOL 100 MG: 10 INJECTION, EMULSION INTRAVENOUS at 11:25

## 2024-03-01 RX ADMIN — DEXAMETHASONE SODIUM PHOSPHATE 8 MG: 4 INJECTION, SOLUTION INTRAMUSCULAR; INTRAVENOUS at 11:25

## 2024-03-01 RX ADMIN — SODIUM CHLORIDE: 9 INJECTION, SOLUTION INTRAVENOUS at 11:14

## 2024-03-01 RX ADMIN — ISOPROTERENOL HYDROCHLORIDE 2 MCG/MIN: 0.2 INJECTION, SOLUTION INTRAMUSCULAR; INTRAVENOUS at 12:27

## 2024-03-01 RX ADMIN — ROCURONIUM BROMIDE 50 MG: 10 INJECTION, SOLUTION INTRAVENOUS at 11:25

## 2024-03-01 RX ADMIN — HEPARIN SODIUM 6000 UNITS: 1000 INJECTION INTRAVENOUS; SUBCUTANEOUS at 12:43

## 2024-03-01 RX ADMIN — PROTAMINE SULFATE 30 MG: 10 INJECTION, SOLUTION INTRAVENOUS at 13:51

## 2024-03-01 RX ADMIN — LIDOCAINE HYDROCHLORIDE 100 MG: 20 INJECTION, SOLUTION EPIDURAL; INFILTRATION; INTRACAUDAL; PERINEURAL at 11:25

## 2024-03-01 ASSESSMENT — LIFESTYLE VARIABLES: SMOKING_STATUS: 0

## 2024-03-01 NOTE — TELEPHONE ENCOUNTER
----- Message from GOLDEN Ram CNP sent at 2/15/2024 10:40 AM EST -----  When does he need follow up with you? Also from EP standpoint, he can stop plavix, but had stent last March so up to you if he can stop for that

## 2024-03-01 NOTE — DISCHARGE INSTRUCTIONS
Discharge instructions    Care of your puncture site:  Remove bandage 24 hours after the procedure.  May shower in 24 hours but do not sit in a bathtub/pool of water for 3 days or until the wound is healed.  Inspect the site daily and gently clean using soap and water while standing in the shower.  Dry thoroughly and apply a Band-Aid that covers the entire site. Do not apply powder or lotion.    Normal Observations:  Soreness or tenderness which may last one week.  Mild oozing from the incision site.  Possible bruising that could last 2 weeks.    Activity:  You may resume driving 24 hours following the procedure.  You may resume normal activity in 3 days or after the wound heals.  Avoid lifting more than 10 pounds for 3 days or until the wound heals.  Avoid strenuous exercise or activity for 1 week.    Nutrition:  Regular diet     Call your doctor immediately if your condition worsens, for any other concerns, for a follow-up appointment or if you experience any of the following:  Significant bleeding that does not stop after 10 minutes of applying firm pressure on the puncture site.  Increased swelling on the groin or leg.  Unusual pain, numbness, or tingling of the groin or down the leg.  Any signs of infection such as: redness, yellow drainage at the site, swelling or pain.        ANESTHESIA DISCHARGE INSTRUCTIONS    Wear your seatbelt home.  You are under the influence of drugs-do not drink alcohol, drive, operate machinery, make any important decisions or sign any legal documents for 24 hours.  Children should not ride bikes, skate boards or play on gym sets for 24 hours after surgery.  A responsible adult needs to be with you for 24 hours.  You may experience lightheadedness, dizziness, or sleepiness following surgery.  Rest at home today- increase activity as tolerated.  Progress slowly to a regular diet unless your physician has instructed you otherwise. Drink plenty of water.  If persistent nausea and

## 2024-03-01 NOTE — PROGRESS NOTES
Pt resting quietly in bed, awake, denies pain. VSS, O2 sats 99% on room air. Pt in sinus bradycardia with HR in 50s. Right groin soft, dressing CDI. Right pedal and posterior tibial pulses palpable, foot warm. Pt seen by anesthesia, phase 1 criteria met.

## 2024-03-01 NOTE — ANESTHESIA POSTPROCEDURE EVALUATION
Department of Anesthesiology  Postprocedure Note    Patient: Samuel Mandujano  MRN: [id number]  YOB: 1955  Date of evaluation: 3/1/2024    Procedure Summary       Date: 03/01/24 Room / Location: [facility] Cath Lab; [facility] Echocardiography    Anesthesia Start: 1114 Anesthesia Stop: 1420    Procedure: ECHO/ABLATION WITH ANESTHESIA Diagnosis: Unspecified atrial flutter    Scheduled Providers: Manfred Garay MD Responsible Provider: Manfred Garay MD    Anesthesia Type: general ASA Status: 3            Anesthesia Type: No value filed.    Hayden Phase I: Hayden Score: 9    Hayden Phase II:      Anesthesia Post Evaluation    Patient location during evaluation: PACU  Patient participation: complete - patient participated  Level of consciousness: awake and alert  Airway patency: patent  Nausea & Vomiting: no vomiting and no nausea  Cardiovascular status: hemodynamically stable  Respiratory status: acceptable  Hydration status: stable  Pain management: adequate    No notable events documented.

## 2024-03-01 NOTE — ANESTHESIA PRE PROCEDURE
Department of Anesthesiology  Preprocedure Note       Name:  Samuel Mandujano   Age:  68 y.o.  :  1955                                          MRN:  [id number]         Date:  3/1/2024      Surgeon: * Surgery not found *    Procedure:     Medications prior to admission:   Prior to Admission medications    Medication Sig Start Date End Date Taking? Authorizing Provider   amiodarone (CORDARONE) 200 MG tablet TAKE 1 TABLET BY MOUTH DAILY 24   Kory Ross APRN - CNP   metoprolol succinate (TOPROL XL) 50 MG extended release tablet TAKE 1 TABLET BY MOUTH IN THE  MORNING AND AT BEDTIME 24   Adriel Sandoval DO   sacubitril-valsartan (ENTRESTO) 24-26 MG per tablet Take 1.5 tablets by mouth 2 times daily 1/15/24 1/9/25  Kory Ross APRN - CNP   empagliflozin (JARDIANCE) 10 MG tablet Take 1 tablet by mouth daily 1/3/24   Kory Ross APRN - CNP   spironolactone (ALDACTONE) 25 MG tablet TAKE 1 TABLET BY MOUTH DAILY 24   Kory Ross APRN - CNP   pantoprazole (PROTONIX) 20 MG tablet Take 1 tablet by mouth every morning (before breakfast) 23   Adriel Sandoval DO   tamsulosin (FLOMAX) 0.4 MG capsule Take 1 capsule by mouth daily    Provider, MD Joe   atorvastatin (LIPITOR) 80 MG tablet TAKE 1 TABLET BY MOUTH EVERY  NIGHT 23   Kory Ross APRN - CNP   clopidogrel (PLAVIX) 75 MG tablet Take 1 tablet by mouth daily 23   Adriel Sandoval DO   aspirin (ASPIRIN 81) 81 MG EC tablet Take 1 tablet by mouth daily 23   Adriel Sandoval DO   oxyCODONE (OXY-IR) 15 MG immediate release tablet Take 1 tablet by mouth 3 times daily as needed (TBI).    Provider, Joe, MD       Current medications:    Current Outpatient Medications   Medication Sig Dispense Refill   • amiodarone (CORDARONE) 200 MG tablet TAKE 1 TABLET BY MOUTH DAILY 100 tablet 2   • metoprolol succinate (TOPROL XL) 50 MG extended release tablet TAKE 1 TABLET BY MOUTH IN THE  MORNING AND AT BEDTIME 200

## 2024-03-01 NOTE — PROGRESS NOTES
Discharge instructions and new medication/medication changes reviewed with pt and pts significant other at bedside, both verbalized understanding. Pt safely dressed and ambulated in PACU, tolerated well and right groin dressing continues to be CDI, groin soft. IV removed without complications. Pt discharged in wheelchair with all belongings to car by Oumar, PCA. Pt tolerated well.

## 2024-03-01 NOTE — H&P
[facility]   Electrophysiology   Date: 3/1/2024  Reason for Follow up: Atrial flutter     CC: Palpitation   HPI: Samuel Mandujano is a 68 y.o. male with atrial atrial flutter and RVR. Rate was not controlled.   Complex history of PAF, HFrEF, mitral regurgitation, HTN, CVA, s/p watchman in August 2023 .   CAD s/p PCI to mid RCA with WILFREDO and PCI to OM and LAD.   History of right parietal parenchymal bleed in 2013.     Assessment:   Atrial flutter with RVR  HFrEF  Moderate MR  HTN  CVA   S/p watchman in August 2023   CAD s/p PCI     Plan:   He has had recent cardioversion for atrial flutter.   Treatment options including ablation discussed with patient, including risks, benefits and alternative discussed.    The risks, benefits and alternatives of the ablation procedure were discussed with the patient. The risks including, but not limited to, the risks of bleeding, infection, radiation exposure, injury to vascular, cardiac and surrounding structures (including pneumothorax), stroke, cardiac perforation, tamponade, need for emergent open heart surgery, need for pacemaker implantation, Injury to the phrenic nerve, injury to the esophagus, myocardial infarction and death were discussed in detail.     Patient risk factors include HFrEF    The patient opted to proceed with the ablation.     If Recurrent atrial fibrillation, ablation on the left side with PVI is reasonable.     Relevant available labs, and cardiovascular diagnostics, documents reviewed.   ECG: Atrial flutter with RVR     NA: 137  K: 3.6   Cr: 1.2   BUN: 30     Hgb: 11.8   Hct: 34.9     Scheduled Meds:   sodium chloride flush  5-40 mL IntraVENous 2 times per day     Continuous Infusions:   sodium chloride       PRN Meds:.sodium chloride flush, sodium chloride     Prior to Admission medications    Medication Sig Start Date End Date Taking? Authorizing Provider   amiodarone (CORDARONE) 200 MG tablet TAKE 1 TABLET BY MOUTH DAILY 2/21/24

## 2024-03-01 NOTE — PROGRESS NOTES
Pt arrived from cath lab to PACU, awakens to voice, denies pain. VSS, O2 sats 100% on 6 L simple mask. Pt in NSR with HR in 60s. Right groin soft, dressing CDI. Right pedal and posterior tibial pulses palpable, foot warm. External urinary catheter in place draining clear yellow urine. Will monitor.

## 2024-03-01 NOTE — PROCEDURES
[facility]     Electrophysiology Procedure Note       Date of Procedure: 3/1/2024  Patient's Name: Samuel Mandujano  YOB: 1955   Medical Record Number: [id number]  Procedure Performed by: Naeem Flor MD    Procedure performed:     Electrophysiology study with radiofrequency ablation of atrial fibrillation and pulmonary vein isolation   Additional ablation of CT isthmus atrial flutter   3-D electroanatomical mapping of the left atrium, and right atium.    Transseptal puncture through an intact septum    Intracardiac echocardiography.   IV drug infusion with Isuprel       Indications for procedure: Symptomatic atrial fibrillation  Samuel Mandujano is a 68 y.o. male with symptomatic atrial flutter and atrial fibrillation.       Details of Procedure:   The risks, benefits and alternatives of the ablation procedure were discussed with the patient. The risks including, but not limited to, the risks of bleeding, infection, radiation exposure, injury to vascular, cardiac and surrounding structures (including pneumothorax), stroke, cardiac perforation, tamponade, need for emergent open heart surgery, need for pacemaker implantation, esophageal injury and fistula, myocardial infarction and death were discussed in detail. The patient opted to proceed with the ablation. Written informed consent was signed and placed in the chart.     Patient was brought to the EP lab in a fasting non-sedate state. Patient underwent general anesthesia by anesthesia team. We initially performed a transesophageal echo that showed no left atrial appendage clot/thrombus. Groins were prepped in a sterile fashion.     Femoral venous access was obtained under live ultrasound guidance.  The femoral vein was identified with real time visualization of needle passage to the venous lumen.     We gained access to right femoral vein. Two 8F and one long 8.5 sheath was using and were placed in the right femoral vein using modified  600/200   Pacing from LV apex, no retrograde conduction over AV node noted.     Procedure was performed with intracardiac ultrasound and 3D mapping system without using fluoroscopy.     At the end of the procedure, using ICE we confirmed the lack of any pericardial effusion.     Protamine was given to reverse the heapin.     Figure of 8 suture was used and sheaths were removed using manual compression.   Sheaths were removed and PerClose device was used for closure of the access sites.     The patient tolerated the procedure well and there were no complications. Patient was extubated and transferred to the floor in stable condition.     EBL less than 20 ml.     Conclusion:     - Pulmonary vein isolations using wide area circumferential radiofrequency ablation   - Additional ablation of CTI right atrial flutter       Plan:   The patient will be observed, receives usual post ablation care and discharged if remains stable. Resume OAC, and PPI for 30 days.     Naeem Flor MD, MPH  [facility] [place]   Office: [phone]

## 2024-03-01 NOTE — PROGRESS NOTES
PRE-PROCEDURE    DATE: 3/1/2024 ARRIVAL TO CATH LAB: 9:47 AM    ADMIT SOURCE: Outpatient    ID & ALLERGY BAND: On    CONSENT: Yes    NPO SINCE: Midnight    PULSES: Right DP 3+  Right PT 3+  Left DP 3+  Left PT 3+    IV SITE : Started in left arm.  with fluids infusing at kvo 9:47 AM     EKG RHYTHM: Sinus Bradycardia

## 2024-03-01 NOTE — PROGRESS NOTES
Pt only able to urine 100 mL with external urinary catheter in place while lying supine in bed and verbalizes being very uncomfortable. Pt sat on bedside once bedrest was up at 1600, additional 100 mL clear yellow urine voided in urinal. Pt and pts significant other informed RN that he had to come back into the ER after last procedure due to being unable to urinate at home. Pt bladder scanned, > 999 mL. Straight cath inserted and 1000 mL clear yellow urine emptied. Pt educated on and emphasis placed on taking his flomax and monitoring urine output once discharged and when to return to ER if needed. Pt and pts significant other verbalize understanding.

## 2024-03-20 ENCOUNTER — TELEPHONE (OUTPATIENT)
Dept: CARDIOLOGY CLINIC | Age: 69
End: 2024-03-20

## 2024-03-20 NOTE — TELEPHONE ENCOUNTER
Pt calling requesting a call back regarding his recent watchman he had put In and what he is and is not allowed to eat   Please call pt and advise  Thank you

## 2024-04-01 RX ORDER — AMIODARONE HYDROCHLORIDE 200 MG/1
200 TABLET ORAL DAILY
Qty: 100 TABLET | Refills: 2 | Status: SHIPPED | OUTPATIENT
Start: 2024-04-01

## 2024-04-01 NOTE — TELEPHONE ENCOUNTER
Medication Refill    Medication needing refilled:  amiodarone     Dosage of the medication:  200mg    How are you taking this medication (QD, BID, TID, QID, PRN):  TAKE 1 TABLET BY MOUTH DAILY     30 or 90 day supply called in: 60    When will you run out of your medication:    Which Pharmacy are we sending the medication to?:    [facility] #[id number]   [address]   [place], [place]   Phone:  [phone]   Fax:  [phone]      NOTE: Pt called stating he actually needed the amiodarone and forgot to stated that when he called in earlier.  Thank you

## 2024-04-01 NOTE — TELEPHONE ENCOUNTER
Spoke to pt, told him we send a year supply on 12/19/23. If he need refill, let pharmacy know. Pt v/u.     Disp Refills Start End    pantoprazole (PROTONIX) 20 MG tablet 90 tablet 3 12/19/2023 --    Sig - Route: Take 1 tablet by mouth every morning (before breakfast) - Oral    Sent to pharmacy as: Pantoprazole Sodium 20 MG Oral Tablet Delayed Release (PROTONIX)    Cosign for Ordering: Accepted by Adriel Sandoval DO on 12/21/2023  8:21 AM    E-Prescribing Status: Receipt confirmed by pharmacy (12/19/2023  3:25 PM EST)    Pharmacy    [facility] #[id number] - [place], [place] - [facility] [address] - P [phone] - F [phone]  [facility] [address] [facility] [address]  Phone: [phone]  Fax: [phone]

## 2024-04-01 NOTE — TELEPHONE ENCOUNTER
Medication Refill    Medication needing refilled:  pantoprazole (PROTONIX)     Dosage of the medication:  20 mg     How are you taking this medication (QD, BID, TID, QID, PRN):  Take 1 tablet by mouth every morning (before breakfast)     30 or 90 day supply called in:  90 tablet     When will you run out of your medication:    Which Pharmacy are we sending the medication to?:    [facility] #[id number] [place] [id number] - P [phone] - F [phone]

## 2024-04-11 NOTE — PROGRESS NOTES
[facility] [place]  4/15/24  Referring: Dr. Villafana ref. provider found    REASON FOR CONSULT/CHIEF COMPLAINT/HPI     Reason for visit/ Chief complaint  Follow up   MR, atrial fibrillation   HPI Samuel Mandujano is a 68 y.o.seen for severe MR.  He has a history of atrial fibrillation ( diagnosed 3/1/23), CAD,CVA, HFrEF, MR, htn, hyperlipidemia. He had a right parietal parenchymal bleed in 2013      In the interval since his last visit he had the watchman procedure followed by ablation    Today he would like to know how much sodium he can take in. He is restricting it to 1500 mg daily. No chest pain shortness of breath  Palpitations or dizziness. No syncope. He reports that he has occasional leg swelling, but weight is the same. Reports that he feels good walking. Can walk up a flight of stairs without stopping.       Patient is adherent with medications and is tolerating them well without side effects     HISTORY/ALLERGIES/ROS     MedHx:  has a past medical history of Headache, Hypertension, Intracerebral hemorrhage (HCC), Neck pain, and Unspecified cerebral artery occlusion with cerebral infarction.  SurgHx:  has a past surgical history that includes shoulder surgery.   SocHx:  reports that he has never smoked. He has never used smokeless tobacco. He reports that he does not currently use alcohol. He reports that he does not use drugs.   FamHx: No family history of premature coronary artery disease, sudden death, or aneurysm  Allergies: Patient has no known allergies.   ROS:   Review of Systems   Respiratory:  Positive for shortness of breath.    All other systems reviewed and are negative.       MEDICATIONS      Prior to Admission medications    Medication Sig Start Date End Date Taking? Authorizing Provider   amiodarone (CORDARONE) 200 MG tablet Take 1 tablet by mouth daily 4/1/24  Yes Kory Ross, APRN - CNP   apixaban (ELIQUIS) 5 MG TABS tablet Take 1 tablet by mouth 2 times daily 3/1/24  Yes Naeem Flor,

## 2024-04-15 ENCOUNTER — HOSPITAL ENCOUNTER (OUTPATIENT)
Age: 69
Discharge: HOME | End: 2024-04-15
Payer: MEDICARE

## 2024-04-15 ENCOUNTER — OFFICE VISIT (OUTPATIENT)
Dept: CARDIOLOGY CLINIC | Age: 69
End: 2024-04-15
Payer: MEDICARE

## 2024-04-15 VITALS
OXYGEN SATURATION: 99 % | SYSTOLIC BLOOD PRESSURE: 126 MMHG | HEIGHT: 72 IN | BODY MASS INDEX: 23.16 KG/M2 | WEIGHT: 171 LBS | DIASTOLIC BLOOD PRESSURE: 76 MMHG | HEART RATE: 58 BPM

## 2024-04-15 DIAGNOSIS — I34.0 MITRAL VALVE INSUFFICIENCY, UNSPECIFIED ETIOLOGY: ICD-10-CM

## 2024-04-15 DIAGNOSIS — I48.0 PAF (PAROXYSMAL ATRIAL FIBRILLATION) (HCC): Primary | ICD-10-CM

## 2024-04-15 DIAGNOSIS — I25.10 CORONARY ARTERY DISEASE INVOLVING NATIVE CORONARY ARTERY OF NATIVE HEART WITHOUT ANGINA PECTORIS: ICD-10-CM

## 2024-04-15 DIAGNOSIS — I48.0 PAROXYSMAL ATRIAL FIBRILLATION (HCC): ICD-10-CM

## 2024-04-15 DIAGNOSIS — I25.10 ASCVD (ARTERIOSCLEROTIC CARDIOVASCULAR DISEASE): ICD-10-CM

## 2024-04-15 DIAGNOSIS — R06.02 SOB (SHORTNESS OF BREATH): ICD-10-CM

## 2024-04-15 DIAGNOSIS — I25.5 ISCHEMIC CARDIOMYOPATHY: ICD-10-CM

## 2024-04-15 DIAGNOSIS — I10 ESSENTIAL HYPERTENSION: ICD-10-CM

## 2024-04-15 DIAGNOSIS — I61.9 CVA (CEREBROVASCULAR ACCIDENT DUE TO INTRACEREBRAL HEMORRHAGE) (HCC): ICD-10-CM

## 2024-04-15 DIAGNOSIS — Z79.01 ON ANTICOAGULANT THERAPY: ICD-10-CM

## 2024-04-15 DIAGNOSIS — I50.20 HFREF (HEART FAILURE WITH REDUCED EJECTION FRACTION) (HCC): ICD-10-CM

## 2024-04-15 DIAGNOSIS — E78.00 PRIMARY HYPERCHOLESTEROLEMIA: ICD-10-CM

## 2024-04-15 DIAGNOSIS — Z86.73 HISTORY OF CVA (CEREBROVASCULAR ACCIDENT): ICD-10-CM

## 2024-04-15 LAB
ALBUMIN SERPL-MCNC: 4 G/DL (ref 3.4–5)
ANION GAP SERPL CALCULATED.3IONS-SCNC: 9 MMOL/L (ref 3–16)
BUN SERPL-MCNC: 22 MG/DL (ref 7–20)
CALCIUM SERPL-MCNC: 8.8 MG/DL (ref 8.3–10.6)
CHLORIDE SERPL-SCNC: 107 MMOL/L (ref 99–110)
CHOLEST SERPL-MCNC: 136 MG/DL (ref 0–199)
CO2 SERPL-SCNC: 24 MMOL/L (ref 21–32)
CREAT SERPL-MCNC: 1.3 MG/DL (ref 0.8–1.3)
GFR SERPLBLD CREATININE-BSD FMLA CKD-EPI: 60 ML/MIN/{1.73_M2}
GLUCOSE SERPL-MCNC: 97 MG/DL (ref 70–99)
HDLC SERPL-MCNC: 50 MG/DL (ref 40–60)
LDLC SERPL CALC-MCNC: 70 MG/DL
PHOSPHATE SERPL-MCNC: 3.2 MG/DL (ref 2.5–4.9)
POTASSIUM SERPL-SCNC: 3.9 MMOL/L (ref 3.5–5.1)
SODIUM SERPL-SCNC: 140 MMOL/L (ref 136–145)
TRIGL SERPL-MCNC: 80 MG/DL (ref 0–150)
VLDLC SERPL CALC-MCNC: 16 MG/DL

## 2024-04-15 PROCEDURE — 80061 LIPID PANEL: CPT

## 2024-04-15 PROCEDURE — 80069 RENAL FUNCTION PANEL: CPT

## 2024-04-15 PROCEDURE — 3078F DIAST BP <80 MM HG: CPT | Performed by: INTERNAL MEDICINE

## 2024-04-15 PROCEDURE — 1123F ACP DISCUSS/DSCN MKR DOCD: CPT | Performed by: INTERNAL MEDICINE

## 2024-04-15 PROCEDURE — 99214 OFFICE O/P EST MOD 30 MIN: CPT | Performed by: INTERNAL MEDICINE

## 2024-04-15 PROCEDURE — 3074F SYST BP LT 130 MM HG: CPT | Performed by: INTERNAL MEDICINE

## 2024-04-15 PROCEDURE — 36415 COLL VENOUS BLD VENIPUNCTURE: CPT

## 2024-04-16 PROBLEM — R06.02 SOB (SHORTNESS OF BREATH): Status: RESOLVED | Noted: 2024-04-15 | Resolved: 2024-04-16

## 2024-04-16 PROBLEM — I48.0 PAROXYSMAL ATRIAL FIBRILLATION (HCC): Status: RESOLVED | Noted: 2023-05-12 | Resolved: 2024-04-16

## 2024-04-17 NOTE — RESULT ENCOUNTER NOTE
Reviewed with aranzaw, he would like him to start repatha due to familial hyperlipidemia--ldl 255 previously. Called patient, no answer, advised him to call me back to discuss

## 2024-04-18 ENCOUNTER — TELEPHONE (OUTPATIENT)
Dept: CARDIOLOGY CLINIC | Age: 69
End: 2024-04-18

## 2024-04-18 NOTE — TELEPHONE ENCOUNTER
Pt called back, I advised RNMM should be in tomorrow to call him again, he stated he would be available.

## 2024-04-19 NOTE — TELEPHONE ENCOUNTER
Pt called to speak w/Gabriella krishna re: His lab results (cholesterol level)  Pt stated he's a little afraid of his results.  Please call to discuss his concerns.  Thank you

## 2024-04-24 ENCOUNTER — TELEPHONE (OUTPATIENT)
Dept: CARDIOLOGY CLINIC | Age: 69
End: 2024-04-24

## 2024-04-24 RX ORDER — CLOPIDOGREL BISULFATE 75 MG/1
75 TABLET ORAL DAILY
Qty: 90 TABLET | Refills: 3 | Status: SHIPPED | OUTPATIENT
Start: 2024-04-24

## 2024-04-24 NOTE — TELEPHONE ENCOUNTER
Eleazar called to request the ov notes and Lipid Panel for the Repatha 140mg/ml.  Please advise.  Thank you

## 2024-05-13 ENCOUNTER — TELEPHONE (OUTPATIENT)
Dept: CARDIOLOGY CLINIC | Age: 69
End: 2024-05-13

## 2024-05-13 NOTE — TELEPHONE ENCOUNTER
Pt is requesting to speak with Dr. Sandoval or his nursing regarding repatha. States he received a call from [facility]'s pharmacy and wanted to relay conversation to his nurse.    Please call back to jory

## 2024-05-13 NOTE — TELEPHONE ENCOUNTER
I spoke with pt. He states that he could not afford the $47 copay.He told [facility] that, when they requested more info including his social  he felt uncomfortable sharing that info - when it was unsolicited. advised pt that if he wants to move forward with getting injections then has to either finish up with [facility] or we can have him fill out papers in the office. Pt stated that he will reach out to [facility] for help.

## 2024-07-01 RX ORDER — ATORVASTATIN CALCIUM 80 MG/1
80 TABLET, FILM COATED ORAL
Qty: 100 TABLET | Refills: 2 | Status: SHIPPED | OUTPATIENT
Start: 2024-07-01

## 2024-07-21 NOTE — PROGRESS NOTES
to control cardiovascular risk factors.   - The patient is counseled to lose weigt to control cardiovascular risk factors.   - The patient is counseled to avoid tobacco use.       All questions and concerns were addressed to the patient/family. Alternatives to my treatment were discussed. I have discussed the above stated plan and the patient verbalized understanding and agreed with the plan.    Physician Attestation: I, Dr. Naeem ford, confirm that the scribe's documentation has been prepared under my direction and personally reviewed by me in its entirety.  I also confirm that the note above accurately reflects all work, treatment, procedures, and medical decision making performed by me.     NOTE: This report was transcribed using voice recognition software. Every effort was made to ensure accuracy, however, inadvertent computerized transcription errors may be present.     Naeem Ford MD, MPH  [facility]   Office: [phone]  Fax: [phone]

## 2024-07-22 NOTE — PROGRESS NOTES
and concerns were addressed to the patient/family. Alternatives to my treatment were discussed. I have discussed the above stated plan and the patient verbalized understanding and agreed with the plan.    Scribe's attestation: This note was scribed in the presence of Dr. Basia MD, by Saadia Chapman RN.    Physician Attestation: I, Dr. Naeem ford, confirm that the scribe's documentation has been prepared under my direction and personally reviewed by me in its entirety.  I also confirm that the note above accurately reflects all work, treatment, procedures, and medical decision making performed by me.     NOTE: This report was transcribed using voice recognition software. Every effort was made to ensure accuracy, however, inadvertent computerized transcription errors may be present.     Naeem Ford MD, MPH  [facility]   Office: [phone]  Fax: [phone]

## 2024-07-24 ENCOUNTER — OFFICE VISIT (OUTPATIENT)
Dept: CARDIOLOGY CLINIC | Age: 69
End: 2024-07-24
Payer: MEDICARE

## 2024-07-24 VITALS
BODY MASS INDEX: 23.35 KG/M2 | HEART RATE: 68 BPM | HEIGHT: 72 IN | SYSTOLIC BLOOD PRESSURE: 124 MMHG | DIASTOLIC BLOOD PRESSURE: 77 MMHG | WEIGHT: 172.4 LBS | OXYGEN SATURATION: 97 %

## 2024-07-24 DIAGNOSIS — I62.9 INTRACRANIAL BLEED (HCC): ICD-10-CM

## 2024-07-24 DIAGNOSIS — I25.10 CORONARY ARTERY DISEASE INVOLVING NATIVE CORONARY ARTERY OF NATIVE HEART WITHOUT ANGINA PECTORIS: ICD-10-CM

## 2024-07-24 DIAGNOSIS — I25.5 ISCHEMIC CARDIOMYOPATHY: ICD-10-CM

## 2024-07-24 DIAGNOSIS — I48.91 ATRIAL FIBRILLATION WITH RVR (HCC): Primary | ICD-10-CM

## 2024-07-24 DIAGNOSIS — I34.0 MITRAL VALVE INSUFFICIENCY, UNSPECIFIED ETIOLOGY: ICD-10-CM

## 2024-07-24 DIAGNOSIS — I10 ESSENTIAL HYPERTENSION: ICD-10-CM

## 2024-07-24 DIAGNOSIS — I50.20 HFREF (HEART FAILURE WITH REDUCED EJECTION FRACTION) (HCC): ICD-10-CM

## 2024-07-24 PROCEDURE — 3078F DIAST BP <80 MM HG: CPT | Performed by: INTERNAL MEDICINE

## 2024-07-24 PROCEDURE — 3074F SYST BP LT 130 MM HG: CPT | Performed by: INTERNAL MEDICINE

## 2024-07-24 PROCEDURE — 99214 OFFICE O/P EST MOD 30 MIN: CPT | Performed by: INTERNAL MEDICINE

## 2024-07-24 PROCEDURE — 1123F ACP DISCUSS/DSCN MKR DOCD: CPT | Performed by: INTERNAL MEDICINE

## 2024-07-24 PROCEDURE — 93000 ELECTROCARDIOGRAM COMPLETE: CPT | Performed by: INTERNAL MEDICINE

## 2024-07-24 RX ORDER — AMIODARONE HYDROCHLORIDE 100 MG/1
100 TABLET ORAL DAILY
Qty: 100 TABLET | Refills: 3 | Status: SHIPPED | OUTPATIENT
Start: 2024-07-24

## 2024-07-24 NOTE — PATIENT INSTRUCTIONS
Reduce amiodarone to 100mg daily.    We will send in a refill for Jardiance.    Call the office for any new, worsening, or concerning symptoms.

## 2024-07-26 ENCOUNTER — TELEPHONE (OUTPATIENT)
Dept: CARDIOLOGY CLINIC | Age: 69
End: 2024-07-26

## 2024-07-26 NOTE — TELEPHONE ENCOUNTER
Pt said the pharmacy called to tell him his amiodarone was ready for pickup. He has never paid for this prescription but today they are charging him $80. Please call to discuss.

## 2024-07-26 NOTE — TELEPHONE ENCOUNTER
Called pt and informed him he needs to call his insurance to find out why there is a charge for his medication. I told him to call back if he has any more questions.

## 2024-08-28 ENCOUNTER — TELEPHONE (OUTPATIENT)
Dept: CARDIOLOGY CLINIC | Age: 69
End: 2024-08-28

## 2024-08-28 NOTE — TELEPHONE ENCOUNTER
Pt called stating they have been having a lot of knee pain and would like to know if it is ok to take OTC Glucosamine 1000 mg?  Or what can they take they will not interfere with  heart medications?    Pls advise SILVIA# [phone]

## 2024-09-11 RX ORDER — SPIRONOLACTONE 25 MG/1
25 TABLET ORAL DAILY
Qty: 100 TABLET | Refills: 2 | Status: SHIPPED | OUTPATIENT
Start: 2024-09-11

## 2024-09-12 NOTE — PROGRESS NOTES
nl, no rash/lesions   Neck S/S, TM, NT, no bruit Psych Nl mood and affect   Lung CTA-B, unlabored, no DTP     Ch wall NT, no deform       LABS and Imaging     Relevant and available CV data reviewed  ARAMIS 5/30/23-~Moderate functional mitral regurgitation~Tiny PFO~Grade I Aortic atheroma  Echo/MRI: 3/7/23-Left ventricular systolic function is severely reduced with ejection  fraction estimated at 25 %. Global hypokinesis.  Left ventricular size appears enlarged.  Patient is in atrial fibrillation during study.  Patent foramen ovale with left-to-right shunt was visualized.  There is no evidence of mass or thrombus in the left atrium or appendage. Significant atherosclerosis with large atheroma in descending and aortic  arch noted.  - 3/1/24 ARAMIS-Summary Left ventricular size is normal. Moderate concentric left ventricular  hypertrophy present. Global left ventricular function is normal with  ejection fraction estimated 55%.   A Watchman Device appears well seated without evidence of leak or thrombus.   The left atrium appears dilated. No evidence of thrombus within left atrium.      Small to moderate anterior pericardial effusion adjacent to the right   ventricle without 2D evidence of cardiac tamponade.    Echo today 10/3/24:      Cath: Successful PCI to RCA stent with 3.75x15 NC balloon angioplasty. PCI to OM1 with 2.25x18 WILFREDO. PCI to OM2 with 2.5x23 WILFREDO. PCI to LAD with 3.5x33 WILFREDO. Excellent Result.   Holter:  EKG personally interpreted: 5/9/23NSB  Stress:  Ct of head 3/2/23- 1. No acute intracranial abnormality.  2. Chronic right parietooccipital encephalomalacia/gliosis  Moderate Risk  Moderate Complexity/Medical Decision Making  Outside/Care everywhere records Reviewed  Labs Reviewed  Prior Imaging, ekg, cath, echo reviewed when available  Medications reviewed  Old Notes reviewed  ASSESSMENT AND PLAN     1.Moderate MR  -stable  - echo today 10/3/24   Plan:  -continue medical therapy. I do not think he needs a

## 2024-10-03 ENCOUNTER — HOSPITAL ENCOUNTER (OUTPATIENT)
Age: 69
Discharge: HOME | End: 2024-10-05
Attending: INTERNAL MEDICINE
Payer: MEDICARE

## 2024-10-03 ENCOUNTER — OFFICE VISIT (OUTPATIENT)
Dept: CARDIOLOGY CLINIC | Age: 69
End: 2024-10-03
Payer: MEDICARE

## 2024-10-03 VITALS
HEIGHT: 72 IN | DIASTOLIC BLOOD PRESSURE: 83 MMHG | WEIGHT: 172 LBS | BODY MASS INDEX: 23.3 KG/M2 | SYSTOLIC BLOOD PRESSURE: 171 MMHG

## 2024-10-03 VITALS
SYSTOLIC BLOOD PRESSURE: 124 MMHG | BODY MASS INDEX: 23.89 KG/M2 | HEART RATE: 59 BPM | DIASTOLIC BLOOD PRESSURE: 86 MMHG | HEIGHT: 72 IN | WEIGHT: 176.4 LBS | OXYGEN SATURATION: 99 %

## 2024-10-03 DIAGNOSIS — I25.5 ISCHEMIC CARDIOMYOPATHY: ICD-10-CM

## 2024-10-03 DIAGNOSIS — I10 ESSENTIAL HYPERTENSION: ICD-10-CM

## 2024-10-03 DIAGNOSIS — I48.0 PAF (PAROXYSMAL ATRIAL FIBRILLATION) (HCC): ICD-10-CM

## 2024-10-03 DIAGNOSIS — E78.2 MIXED HYPERLIPIDEMIA: Primary | ICD-10-CM

## 2024-10-03 DIAGNOSIS — I34.0 MODERATE TO SEVERE MITRAL REGURGITATION: ICD-10-CM

## 2024-10-03 DIAGNOSIS — I25.10 CORONARY ARTERY DISEASE INVOLVING NATIVE CORONARY ARTERY OF NATIVE HEART WITHOUT ANGINA PECTORIS: ICD-10-CM

## 2024-10-03 DIAGNOSIS — I48.91 ATRIAL FIBRILLATION WITH RVR (HCC): ICD-10-CM

## 2024-10-03 LAB
ECHO AO ASC DIAM: 3.6 CM
ECHO AO ASCENDING AORTA INDEX: 1.78 CM/M2
ECHO AO ROOT DIAM: 3.7 CM
ECHO AO ROOT INDEX: 1.83 CM/M2
ECHO AV AREA PEAK VELOCITY: 2.3 CM2
ECHO AV AREA VTI: 2.3 CM2
ECHO AV AREA/BSA PEAK VELOCITY: 1.1 CM2/M2
ECHO AV AREA/BSA VTI: 1.1 CM2/M2
ECHO AV MEAN GRADIENT: 7 MMHG
ECHO AV MEAN VELOCITY: 1.2 M/S
ECHO AV PEAK GRADIENT: 13 MMHG
ECHO AV PEAK VELOCITY: 1.8 M/S
ECHO AV VELOCITY RATIO: 0.72
ECHO AV VTI: 37.1 CM
ECHO AV VTI: 37.1 CM
ECHO BSA: 2.02 M2
ECHO EST RA PRESSURE: 3 MMHG
ECHO IVC PROX: 1.6 CM
ECHO LA AREA 2C: 26.1 CM2
ECHO LA AREA 4C: 18.8 CM2
ECHO LA DIAMETER INDEX: 1.83 CM/M2
ECHO LA DIAMETER: 3.7 CM
ECHO LA MAJOR AXIS: 5.4 CM
ECHO LA MINOR AXIS: 6.7 CM
ECHO LA TO AORTIC ROOT RATIO: 1
ECHO LA VOL BP: 75 ML (ref 18–58)
ECHO LA VOL MOD A2C: 83 ML (ref 18–58)
ECHO LA VOL MOD A4C: 55 ML (ref 18–58)
ECHO LA VOL/BSA BIPLANE: 37 ML/M2 (ref 16–34)
ECHO LA VOLUME INDEX MOD A2C: 41 ML/M2 (ref 16–34)
ECHO LA VOLUME INDEX MOD A4C: 27 ML/M2 (ref 16–34)
ECHO LV E' LATERAL VELOCITY: 8.1 CM/S
ECHO LV E' SEPTAL VELOCITY: 4.7 CM/S
ECHO LV EDV A2C: 138 ML
ECHO LV EDV A4C: 139 ML
ECHO LV EDV INDEX A4C: 69 ML/M2
ECHO LV EDV NDEX A2C: 68 ML/M2
ECHO LV EJECTION FRACTION A2C: 55 %
ECHO LV EJECTION FRACTION A4C: 57 %
ECHO LV EJECTION FRACTION BIPLANE: 56 % (ref 55–100)
ECHO LV ESV A2C: 62 ML
ECHO LV ESV A4C: 61 ML
ECHO LV ESV INDEX A2C: 31 ML/M2
ECHO LV ESV INDEX A4C: 30 ML/M2
ECHO LV FRACTIONAL SHORTENING: 24 % (ref 28–44)
ECHO LV GLOBAL LONGITUDINAL STRAIN (GLS): -16.4 %
ECHO LV INTERNAL DIMENSION DIASTOLE INDEX: 2.48 CM/M2
ECHO LV INTERNAL DIMENSION DIASTOLIC: 5 CM (ref 4.2–5.9)
ECHO LV INTERNAL DIMENSION SYSTOLIC INDEX: 1.88 CM/M2
ECHO LV INTERNAL DIMENSION SYSTOLIC: 3.8 CM
ECHO LV IVSD: 1.2 CM (ref 0.6–1)
ECHO LV MASS 2D: 247.6 G (ref 88–224)
ECHO LV MASS INDEX 2D: 122.6 G/M2 (ref 49–115)
ECHO LV POSTERIOR WALL DIASTOLIC: 1.3 CM (ref 0.6–1)
ECHO LV RELATIVE WALL THICKNESS RATIO: 0.52
ECHO LVOT AREA: 3.1 CM2
ECHO LVOT DIAM: 2 CM
ECHO LVOT MEAN GRADIENT: 3 MMHG
ECHO LVOT PEAK GRADIENT: 7 MMHG
ECHO LVOT PEAK VELOCITY: 1.3 M/S
ECHO LVOT STROKE VOLUME INDEX: 42 ML/M2
ECHO LVOT SV: 84.8 ML
ECHO LVOT VTI: 27 CM
ECHO MV A VELOCITY: 0.5 M/S
ECHO MV AREA VTI: 3.4 CM2
ECHO MV E DECELERATION TIME (DT): 289 MS
ECHO MV E VELOCITY: 0.61 M/S
ECHO MV E/A RATIO: 1.22
ECHO MV E/E' LATERAL: 7.53
ECHO MV E/E' RATIO (AVERAGED): 10.25
ECHO MV E/E' SEPTAL: 12.98
ECHO MV LVOT VTI INDEX: 0.91
ECHO MV MAX VELOCITY: 0.7 M/S
ECHO MV MEAN GRADIENT: 1 MMHG
ECHO MV MEAN VELOCITY: 0.4 M/S
ECHO MV PEAK GRADIENT: 2 MMHG
ECHO MV REGURGITANT ALIASING (NYQUIST) VELOCITY: 37 CM/S
ECHO MV REGURGITANT PEAK GRADIENT: 117 MMHG
ECHO MV REGURGITANT PEAK VELOCITY: 5.4 M/S
ECHO MV REGURGITANT RADIUS PISA: 0.3 CM
ECHO MV REGURGITANT VTIA: 204 CM
ECHO MV VTI: 24.7 CM
ECHO PV MAX VELOCITY: 1.1 M/S
ECHO PV PEAK GRADIENT: 5 MMHG
ECHO RA AREA 4C: 20.5 CM2
ECHO RA END SYSTOLIC VOLUME APICAL 4 CHAMBER INDEX BSA: 31 ML/M2
ECHO RA VOLUME: 62 ML
ECHO RIGHT VENTRICULAR SYSTOLIC PRESSURE (RVSP): 23 MMHG
ECHO RV BASAL DIMENSION: 4.4 CM
ECHO RV FREE WALL PEAK S': 11.7 CM/S
ECHO RV LONGITUDINAL DIMENSION: 5.8 CM
ECHO RV MID DIMENSION: 3.5 CM
ECHO RV TAPSE: 2.1 CM (ref 1.7–?)
ECHO TV REGURGITANT MAX VELOCITY: 2.26 M/S
ECHO TV REGURGITANT PEAK GRADIENT: 20 MMHG

## 2024-10-03 PROCEDURE — 93306 TTE W/DOPPLER COMPLETE: CPT

## 2024-10-03 PROCEDURE — 3079F DIAST BP 80-89 MM HG: CPT | Performed by: INTERNAL MEDICINE

## 2024-10-03 PROCEDURE — 99214 OFFICE O/P EST MOD 30 MIN: CPT | Performed by: INTERNAL MEDICINE

## 2024-10-03 PROCEDURE — 3074F SYST BP LT 130 MM HG: CPT | Performed by: INTERNAL MEDICINE

## 2024-10-03 PROCEDURE — 1123F ACP DISCUSS/DSCN MKR DOCD: CPT | Performed by: INTERNAL MEDICINE

## 2024-10-03 NOTE — PATIENT INSTRUCTIONS
Call PCP regarding knee pain    Before next appointment, recheck lipid (labs)    Call for any change in symptoms, call to report any changes in shortness of breath or development of chest pain with activity.    Follow up in 6 mos

## 2024-10-04 ENCOUNTER — TELEPHONE (OUTPATIENT)
Dept: CARDIOLOGY CLINIC | Age: 69
End: 2024-10-04

## 2024-10-14 ENCOUNTER — TELEPHONE (OUTPATIENT)
Dept: CARDIOLOGY CLINIC | Age: 69
End: 2024-10-14

## 2024-10-14 NOTE — TELEPHONE ENCOUNTER
Pt is very concerned about the results he was given from his echo. Pt would like to speak to DKW directly. Please call pt

## 2024-11-23 ENCOUNTER — HOSPITAL ENCOUNTER (EMERGENCY)
Age: 69
Discharge: HOME | End: 2024-11-23
Payer: MEDICARE

## 2024-11-23 VITALS
SYSTOLIC BLOOD PRESSURE: 170 MMHG | DIASTOLIC BLOOD PRESSURE: 90 MMHG | RESPIRATION RATE: 20 BRPM | HEART RATE: 59 BPM | OXYGEN SATURATION: 96 % | BODY MASS INDEX: 23.7 KG/M2 | WEIGHT: 175 LBS | HEIGHT: 72 IN | TEMPERATURE: 97.9 F

## 2024-11-23 DIAGNOSIS — T63.591A: Primary | ICD-10-CM

## 2024-11-23 PROCEDURE — 90471 IMMUNIZATION ADMIN: CPT | Performed by: PHYSICIAN ASSISTANT

## 2024-11-23 PROCEDURE — 99284 EMERGENCY DEPT VISIT MOD MDM: CPT

## 2024-11-23 PROCEDURE — 6370000000 HC RX 637 (ALT 250 FOR IP): Performed by: PHYSICIAN ASSISTANT

## 2024-11-23 PROCEDURE — 6360000002 HC RX W HCPCS: Performed by: PHYSICIAN ASSISTANT

## 2024-11-23 PROCEDURE — 90714 TD VACC NO PRESV 7 YRS+ IM: CPT | Performed by: PHYSICIAN ASSISTANT

## 2024-11-23 RX ORDER — SULFAMETHOXAZOLE AND TRIMETHOPRIM 800; 160 MG/1; MG/1
1 TABLET ORAL 2 TIMES DAILY
Qty: 20 TABLET | Refills: 0 | Status: SHIPPED | OUTPATIENT
Start: 2024-11-23 | End: 2024-12-03

## 2024-11-23 RX ORDER — HYDROCODONE BITARTRATE AND ACETAMINOPHEN 5; 325 MG/1; MG/1
1 TABLET ORAL EVERY 6 HOURS PRN
Qty: 10 TABLET | Refills: 0 | Status: SHIPPED | OUTPATIENT
Start: 2024-11-23 | End: 2024-11-26

## 2024-11-23 RX ORDER — OXYCODONE AND ACETAMINOPHEN 5; 325 MG/1; MG/1
1 TABLET ORAL ONCE
Status: COMPLETED | OUTPATIENT
Start: 2024-11-23 | End: 2024-11-23

## 2024-11-23 RX ADMIN — OXYCODONE HYDROCHLORIDE AND ACETAMINOPHEN 1 TABLET: 5; 325 TABLET ORAL at 08:47

## 2024-11-23 RX ADMIN — CLOSTRIDIUM TETANI TOXOID ANTIGEN (FORMALDEHYDE INACTIVATED) AND CORYNEBACTERIUM DIPHTHERIAE TOXOID ANTIGEN (FORMALDEHYDE INACTIVATED) 0.5 ML: 5; 2 INJECTION, SUSPENSION INTRAMUSCULAR at 09:19

## 2024-11-23 ASSESSMENT — PAIN DESCRIPTION - PAIN TYPE: TYPE: ACUTE PAIN

## 2024-11-23 ASSESSMENT — LIFESTYLE VARIABLES: HOW OFTEN DO YOU HAVE A DRINK CONTAINING ALCOHOL: NEVER

## 2024-11-23 ASSESSMENT — ENCOUNTER SYMPTOMS
COLOR CHANGE: 1
ABDOMINAL PAIN: 0
NAUSEA: 0
COUGH: 0
DIARRHEA: 0
SHORTNESS OF BREATH: 0
RHINORRHEA: 0
VOMITING: 0
WHEEZING: 0

## 2024-11-23 ASSESSMENT — PAIN DESCRIPTION - LOCATION: LOCATION: FINGER (COMMENT WHICH ONE);HAND

## 2024-11-23 ASSESSMENT — PAIN DESCRIPTION - FREQUENCY: FREQUENCY: CONTINUOUS

## 2024-11-23 ASSESSMENT — PAIN DESCRIPTION - ORIENTATION: ORIENTATION: RIGHT

## 2024-11-23 ASSESSMENT — PAIN - FUNCTIONAL ASSESSMENT: PAIN_FUNCTIONAL_ASSESSMENT: 0-10

## 2024-11-23 ASSESSMENT — PAIN SCALES - GENERAL: PAINLEVEL_OUTOF10: 8

## 2024-11-23 NOTE — ED PROVIDER NOTES
history of Headache, Hypertension, Intracerebral hemorrhage (HCC) (5/12/2013), Neck pain, and Unspecified cerebral artery occlusion with cerebral infarction.     EMERGENCY DEPARTMENT COURSE and DIFFERENTIAL DIAGNOSIS/MDM:   Vitals:    Vitals:    11/23/24 0838   BP: (!) 170/90   Pulse: 59   Resp: 20   Temp: 97.9 °F (36.6 °C)   TempSrc: Oral   SpO2: 96%   Weight: 79.4 kg (175 lb)   Height: 1.829 m (6')       Patient was given the following medications:  Medications   oxyCODONE-acetaminophen (PERCOCET) 5-325 MG per tablet 1 tablet (1 tablet Oral Given 11/23/24 0847)   tetanus & diphtheria toxoids (adult) 5-2 LFU injection 0.5 mL (0.5 mLs IntraMUSCular Given 11/23/24 0919)             Is this patient to be included in the SEP-1 Core Measure due to severe sepsis or septic shock?   No   Exclusion criteria - the patient is NOT to be included for SEP-1 Core Measure due to:  Infection is not suspected    Chronic Conditions affecting care:    has a past medical history of Headache, Hypertension, Intracerebral hemorrhage (HCC) (5/12/2013), Neck pain, and Unspecified cerebral artery occlusion with cerebral infarction.    CONSULTS: (Who and What was discussed)  None      Social Determinants Significantly Affecting Health : None    Records Reviewed (External and Source) n/a    CC/HPI Summary, DDx, ED Course, and Reassessment: Patient presents for evaluation after being stung by a lion fish.  On exam, he appears uncomfortable but in no acute distress and nontoxic.  Slightly hypertensive but vitals otherwise within normal limits and he is afebrile.  He does have blistering forming at the tip of his right third distal phalanx.  I do not appreciate significant puncture wound.  There is no bleeding.  No evidence of retained foreign body.  There is associated warmth and erythema of the third digit and subjective burning over the dorsum of the right hand.  He was given Percocet for pain control and immediately hand was placed in hot  program.  Efforts were made to edit the dictations but occasionally words are mis-transcribed.)    Roxane Faith PA-C (electronically signed)           Roxane Faith PA-C  11/23/24 1017

## 2024-11-29 RX ORDER — AMIODARONE HYDROCHLORIDE 200 MG/1
100 TABLET ORAL DAILY
Qty: 60 TABLET | Refills: 0 | Status: SHIPPED | OUTPATIENT
Start: 2024-11-29

## 2024-11-29 NOTE — TELEPHONE ENCOUNTER
Pharmacy change.    Last ov:10/03/24 DKW  Next ov: recall list 24 PAMD  Last EK24  Last labs:4/15/24  Last filled:   Disp Refills Start End    amiodarone (PACERONE) 100 MG tablet 100 tablet 3 2024 --    Sig - Route: Take 1 tablet by mouth daily - Oral    Sent to pharmacy as: Amiodarone HCl 100 MG Oral Tablet (PACERONE)    Notes to Pharmacy: Please send a replace/new response with 100-Day Supply if appropriate to maximize member benefit. Requesting 1 year supply.    Cosign for Ordering: Accepted by Naeem Flor MD on 2024 10:29 AM    E-Prescribing Status: Receipt confirmed by pharmacy (2024 10:27 AM EDT)

## 2024-11-29 NOTE — TELEPHONE ENCOUNTER
Received refill request for metoprolol succinate (TOPROL XL) 50 MG extended release tablet  from [facility].     Last OV: 10- DKW    Next OV: None.     Last EK2024     Last Filled: 2024 DKW

## 2024-12-02 RX ORDER — METOPROLOL SUCCINATE 50 MG/1
50 TABLET, EXTENDED RELEASE ORAL 2 TIMES DAILY
Qty: 180 TABLET | Refills: 3 | Status: SHIPPED | OUTPATIENT
Start: 2024-12-02

## 2024-12-03 RX ORDER — EMPAGLIFLOZIN 10 MG/1
10 TABLET, FILM COATED ORAL DAILY
Qty: 90 TABLET | Refills: 3 | Status: SHIPPED | OUTPATIENT
Start: 2024-12-03

## 2024-12-03 NOTE — TELEPHONE ENCOUNTER
Received refill request for Nazia Baker from [facility] pharmacy.     Last OV: 10/03/24    Next OV: 01/21/25    Last Labs: BMP 03/01/24    Last Filled: 07/24/24

## 2024-12-31 NOTE — TELEPHONE ENCOUNTER
Last ov:10/03/24 DKW  Next ov:2025 MDP  Last EK24  Last labs:4/15/24  Last filled:   Disp Refills Start End    Evolocumab 140 MG/ML SOAJ 2 Adjustable Dose Pre-filled Pen Syringe 5 2024 --    Sig - Route: Inject 140 mcg into the skin every 14 days - SubCUTAneous    Sent to pharmacy as: Evolocumab 140 MG/ML Subcutaneous Solution Auto-injector    Cosign for Ordering: Accepted by Adriel Sandoval DO on 2024  9:24 PM    E-Prescribing Status: Receipt confirmed by pharmacy (2024 12:15 PM EDT)

## 2025-01-02 RX ORDER — EVOLOCUMAB 140 MG/ML
INJECTION, SOLUTION SUBCUTANEOUS
Qty: 2 ML | Refills: 5 | Status: SHIPPED | OUTPATIENT
Start: 2025-01-02

## 2025-01-13 ENCOUNTER — TELEPHONE (OUTPATIENT)
Dept: CARDIOLOGY CLINIC | Age: 70
End: 2025-01-13

## 2025-01-13 ENCOUNTER — HOSPITAL ENCOUNTER (OUTPATIENT)
Dept: VASCULAR LAB | Age: 70
Discharge: HOME | End: 2025-01-15
Attending: INTERNAL MEDICINE
Payer: MEDICARE

## 2025-01-13 DIAGNOSIS — R60.0 LOCALIZED EDEMA: ICD-10-CM

## 2025-01-13 DIAGNOSIS — R60.9 SWELLING: Primary | ICD-10-CM

## 2025-01-13 DIAGNOSIS — R60.9 SWELLING: ICD-10-CM

## 2025-01-13 PROCEDURE — 93971 EXTREMITY STUDY: CPT

## 2025-01-13 NOTE — TELEPHONE ENCOUNTER
Pt states his legs are snug, and it is worrying him and he would like to talk to a doctor about it.    EDEMA    1) What type of symptoms are you having? Started a couple days ago  How long have you been having these symptoms?  Few days  2) Any swelling?yes   If so, where?  When he squeezes his leg it feels tighter than normal, it is only his left leg, sometimes it has a pain, no redness, does not feel any warmer that any other time.  3) Any weight gain? He states he has put on a couple of pounds since it started snowing, he has been inactive.    4) Are you taking a diuretic (water pill)? He thinks he is, but he is not sure as his ex wife takes care of his medication    States his BP was a little elevated this morning but nothing out of the normal.    Denies SOB.

## 2025-01-13 NOTE — TELEPHONE ENCOUNTER
Called patient, no recent  injury to leg.  Did shovel snow  Has few day history of left leg heaviness, calf seems swollen and heavier than normal, associated with a \"pain behind his calf\" when he presses on it. No pain when walking, no warmth or redness .No sob. (Denies previous DVT.  Had this one other time and took lasix with relief, no longer has any lasix)

## 2025-01-14 PROCEDURE — 93971 EXTREMITY STUDY: CPT | Performed by: SURGERY

## 2025-01-16 ENCOUNTER — TELEPHONE (OUTPATIENT)
Dept: CARDIOLOGY CLINIC | Age: 70
End: 2025-01-16

## 2025-01-16 DIAGNOSIS — I25.10 CORONARY ARTERY DISEASE INVOLVING NATIVE CORONARY ARTERY OF NATIVE HEART WITHOUT ANGINA PECTORIS: ICD-10-CM

## 2025-01-16 DIAGNOSIS — R06.02 SOB (SHORTNESS OF BREATH): ICD-10-CM

## 2025-01-16 DIAGNOSIS — I34.0 MODERATE TO SEVERE MITRAL REGURGITATION: Primary | ICD-10-CM

## 2025-01-16 RX ORDER — FUROSEMIDE 40 MG/1
40 TABLET ORAL DAILY
Qty: 30 TABLET | Refills: 0 | Status: SHIPPED | OUTPATIENT
Start: 2025-01-16

## 2025-01-16 RX ORDER — FUROSEMIDE 40 MG/1
TABLET ORAL
Qty: 90 TABLET | OUTPATIENT
Start: 2025-01-16

## 2025-01-16 NOTE — TELEPHONE ENCOUNTER
Patient was instructed to take lasix 40 tonight and tomorrow, follow up in office tomorrow--appt scheduled

## 2025-01-17 ENCOUNTER — HOSPITAL ENCOUNTER (OUTPATIENT)
Age: 70
Discharge: HOME | End: 2025-01-17
Payer: MEDICARE

## 2025-01-17 ENCOUNTER — OFFICE VISIT (OUTPATIENT)
Dept: CARDIOLOGY CLINIC | Age: 70
End: 2025-01-17
Payer: MEDICARE

## 2025-01-17 VITALS
BODY MASS INDEX: 24.11 KG/M2 | WEIGHT: 178 LBS | HEART RATE: 53 BPM | DIASTOLIC BLOOD PRESSURE: 66 MMHG | OXYGEN SATURATION: 94 % | SYSTOLIC BLOOD PRESSURE: 118 MMHG | HEIGHT: 72 IN

## 2025-01-17 DIAGNOSIS — I25.10 CORONARY ARTERY DISEASE INVOLVING NATIVE CORONARY ARTERY OF NATIVE HEART WITHOUT ANGINA PECTORIS: ICD-10-CM

## 2025-01-17 DIAGNOSIS — I50.20 HFREF (HEART FAILURE WITH REDUCED EJECTION FRACTION) (HCC): Primary | ICD-10-CM

## 2025-01-17 DIAGNOSIS — I10 ESSENTIAL HYPERTENSION: ICD-10-CM

## 2025-01-17 DIAGNOSIS — R06.02 SOB (SHORTNESS OF BREATH): ICD-10-CM

## 2025-01-17 DIAGNOSIS — I48.91 ATRIAL FIBRILLATION WITH RVR (HCC): ICD-10-CM

## 2025-01-17 DIAGNOSIS — I62.9 INTRACRANIAL BLEED (HCC): ICD-10-CM

## 2025-01-17 DIAGNOSIS — I25.5 ISCHEMIC CARDIOMYOPATHY: ICD-10-CM

## 2025-01-17 DIAGNOSIS — E78.2 MIXED HYPERLIPIDEMIA: ICD-10-CM

## 2025-01-17 DIAGNOSIS — I34.0 MODERATE TO SEVERE MITRAL REGURGITATION: ICD-10-CM

## 2025-01-17 LAB
ALBUMIN SERPL-MCNC: 4.4 G/DL (ref 3.4–5)
ANION GAP SERPL CALCULATED.3IONS-SCNC: 15 MMOL/L (ref 3–16)
BASOPHILS # BLD: 0.1 K/UL (ref 0–0.2)
BASOPHILS NFR BLD: 1.2 %
BUN SERPL-MCNC: 19 MG/DL (ref 7–20)
CALCIUM SERPL-MCNC: 9.5 MG/DL (ref 8.3–10.6)
CHLORIDE SERPL-SCNC: 102 MMOL/L (ref 99–110)
CHOLEST SERPL-MCNC: 126 MG/DL (ref 0–199)
CO2 SERPL-SCNC: 25 MMOL/L (ref 21–32)
CREAT SERPL-MCNC: 1.4 MG/DL (ref 0.8–1.3)
DEPRECATED RDW RBC AUTO: 13.2 % (ref 12.4–15.4)
EOSINOPHIL # BLD: 0.5 K/UL (ref 0–0.6)
EOSINOPHIL NFR BLD: 7.5 %
GFR SERPLBLD CREATININE-BSD FMLA CKD-EPI: 54 ML/MIN/{1.73_M2}
GLUCOSE SERPL-MCNC: 103 MG/DL (ref 70–99)
HCT VFR BLD AUTO: 42.3 % (ref 40.5–52.5)
HDLC SERPL-MCNC: 55 MG/DL (ref 40–60)
HGB BLD-MCNC: 14.3 G/DL (ref 13.5–17.5)
LDLC SERPL CALC-MCNC: 51 MG/DL
LYMPHOCYTES # BLD: 0.9 K/UL (ref 1–5.1)
LYMPHOCYTES NFR BLD: 13.6 %
MCH RBC QN AUTO: 31.8 PG (ref 26–34)
MCHC RBC AUTO-ENTMCNC: 33.8 G/DL (ref 31–36)
MCV RBC AUTO: 94.2 FL (ref 80–100)
MONOCYTES # BLD: 0.8 K/UL (ref 0–1.3)
MONOCYTES NFR BLD: 11.8 %
NEUTROPHILS # BLD: 4.6 K/UL (ref 1.7–7.7)
NEUTROPHILS NFR BLD: 65.9 %
NT-PROBNP SERPL-MCNC: 563 PG/ML (ref 0–124)
PHOSPHATE SERPL-MCNC: 3.3 MG/DL (ref 2.5–4.9)
PLATELET # BLD AUTO: 200 K/UL (ref 135–450)
PMV BLD AUTO: 10.1 FL (ref 5–10.5)
POTASSIUM SERPL-SCNC: 3.7 MMOL/L (ref 3.5–5.1)
RBC # BLD AUTO: 4.5 M/UL (ref 4.2–5.9)
SODIUM SERPL-SCNC: 142 MMOL/L (ref 136–145)
TRIGL SERPL-MCNC: 100 MG/DL (ref 0–150)
VLDLC SERPL CALC-MCNC: 20 MG/DL
WBC # BLD AUTO: 6.9 K/UL (ref 4–11)

## 2025-01-17 PROCEDURE — 99214 OFFICE O/P EST MOD 30 MIN: CPT | Performed by: INTERNAL MEDICINE

## 2025-01-17 PROCEDURE — 1123F ACP DISCUSS/DSCN MKR DOCD: CPT | Performed by: INTERNAL MEDICINE

## 2025-01-17 PROCEDURE — 85025 COMPLETE CBC W/AUTO DIFF WBC: CPT

## 2025-01-17 PROCEDURE — 3078F DIAST BP <80 MM HG: CPT | Performed by: INTERNAL MEDICINE

## 2025-01-17 PROCEDURE — 3074F SYST BP LT 130 MM HG: CPT | Performed by: INTERNAL MEDICINE

## 2025-01-17 PROCEDURE — 1159F MED LIST DOCD IN RCRD: CPT | Performed by: INTERNAL MEDICINE

## 2025-01-17 PROCEDURE — 83880 ASSAY OF NATRIURETIC PEPTIDE: CPT

## 2025-01-17 PROCEDURE — 93000 ELECTROCARDIOGRAM COMPLETE: CPT | Performed by: INTERNAL MEDICINE

## 2025-01-17 PROCEDURE — 80069 RENAL FUNCTION PANEL: CPT

## 2025-01-17 PROCEDURE — 80061 LIPID PANEL: CPT

## 2025-01-17 PROCEDURE — 36415 COLL VENOUS BLD VENIPUNCTURE: CPT

## 2025-01-17 ASSESSMENT — ENCOUNTER SYMPTOMS: SHORTNESS OF BREATH: 1

## 2025-01-17 NOTE — PROGRESS NOTES
hemorrhage  - 8/6/13 hemorrhage on ct  Plan  -stopped doac per ep     7. Essential Htn  -118/66  stable  Plan  -continue on toprol 50 mg bid  -continue on entresto   -recommend 2 gm sodium diet     8. Primary hypercholesterolemia   -2016  tc 196  hdl 54 ldl 107  tri 176  -8/8/23-  tc 182  hdl 49 ldl 108 tri 126  4/15/24  TG 80 HDL 50  LDL 70   Plan  -continue lipitor 80 mg daily  -continue Repatha   - Before next appointment, recheck lipid (labs)    9. Itching  - always worse in winter  Plan  - zyrtec/cerave to skin, follow up with dermatology if no improvement       Entresto was restarted, unclear why it was off  his list.  He will continue  lasix 40 mg daily over the weekend and call on Monday with weights. Recommend zyrtec and cerave for itching.  Patient verbalizes understanding of the need for treatment and education provided at today's visit. Additional education materials will be provided in the AVS.       Patient counseled on lifestyle modification, diet, and exercise.    Follow Up: April    Dr. Eliecer Sadnoval DO  Cardiologist [facility]    Scribe's attestation: This note was scribed in the presence of Dr Sandoval by Gabriella Clark RN        Physician Attestation  The scribe for and in the presence of me (Adriel Sandoval DO).  The scribe Saeid Pisano RN may have prepopulated components of this note with my historical  intellectual property under my direct supervision.  Any additions to this intellectual property were performed in my presence and at my direction.  Furthermore, the content and accuracy of this note have been reviewed by me (Adriel Sanodval DO).  1/17/2025 1:08 PM

## 2025-01-17 NOTE — PATIENT INSTRUCTIONS
Restart entresto 24/26 1.5 twice a day  Continue to take lasix 40 mg daily  Daily weights, goal is 172 pounds  Call us on Monday with weights  Recommend zyrtec and cerave to itching skin  Follow up in April  Call for any questions

## 2025-01-20 ENCOUNTER — TELEPHONE (OUTPATIENT)
Dept: ADMINISTRATIVE | Age: 70
End: 2025-01-20

## 2025-01-20 NOTE — TELEPHONE ENCOUNTER
Submitted PA for Entresto 24-26MG tablets   Via [facility] Key: [id number]  STATUS: PENDING.    Follow up done daily; if no decision with in three days we will refax.  If another three days goes by with no decision will call the insurance for status.

## 2025-01-21 ENCOUNTER — OFFICE VISIT (OUTPATIENT)
Dept: CARDIOLOGY CLINIC | Age: 70
End: 2025-01-21
Payer: MEDICARE

## 2025-01-21 VITALS
HEART RATE: 51 BPM | HEIGHT: 72 IN | OXYGEN SATURATION: 98 % | SYSTOLIC BLOOD PRESSURE: 118 MMHG | DIASTOLIC BLOOD PRESSURE: 76 MMHG | BODY MASS INDEX: 24.76 KG/M2 | WEIGHT: 182.8 LBS

## 2025-01-21 DIAGNOSIS — I10 ESSENTIAL HYPERTENSION: ICD-10-CM

## 2025-01-21 DIAGNOSIS — I34.0 MODERATE TO SEVERE MITRAL REGURGITATION: ICD-10-CM

## 2025-01-21 DIAGNOSIS — I48.91 ATRIAL FIBRILLATION WITH RVR (HCC): Primary | ICD-10-CM

## 2025-01-21 PROCEDURE — 99214 OFFICE O/P EST MOD 30 MIN: CPT

## 2025-01-21 PROCEDURE — 3078F DIAST BP <80 MM HG: CPT

## 2025-01-21 PROCEDURE — 1159F MED LIST DOCD IN RCRD: CPT

## 2025-01-21 PROCEDURE — 3074F SYST BP LT 130 MM HG: CPT

## 2025-01-21 PROCEDURE — 1160F RVW MEDS BY RX/DR IN RCRD: CPT

## 2025-01-21 PROCEDURE — 93000 ELECTROCARDIOGRAM COMPLETE: CPT

## 2025-01-21 PROCEDURE — 1123F ACP DISCUSS/DSCN MKR DOCD: CPT

## 2025-01-22 NOTE — TELEPHONE ENCOUNTER
Approved on January 20 by [employer] 2017 NCP[employer]  Request Reference Number: [id number]. ENTRESTO TAB 24-26MG is approved through 12/31/2025. Your patient may now fill this prescription and it will be covered.  Authorization Expiration Date: 12/31/2025

## 2025-01-29 ENCOUNTER — TELEPHONE (OUTPATIENT)
Dept: CARDIOLOGY CLINIC | Age: 70
End: 2025-01-29

## 2025-01-31 ENCOUNTER — HOSPITAL ENCOUNTER (OUTPATIENT)
Age: 70
Discharge: HOME | End: 2025-01-31
Payer: MEDICARE

## 2025-01-31 DIAGNOSIS — I48.91 ATRIAL FIBRILLATION WITH RVR (HCC): ICD-10-CM

## 2025-01-31 LAB
ALBUMIN SERPL-MCNC: 4.3 G/DL (ref 3.4–5)
ALP SERPL-CCNC: 110 U/L (ref 40–129)
ALT SERPL-CCNC: 23 U/L (ref 10–40)
ANION GAP SERPL CALCULATED.3IONS-SCNC: 11 MMOL/L (ref 3–16)
AST SERPL-CCNC: 24 U/L (ref 15–37)
BILIRUB DIRECT SERPL-MCNC: 0.4 MG/DL (ref 0–0.3)
BILIRUB INDIRECT SERPL-MCNC: 0.6 MG/DL (ref 0–1)
BILIRUB SERPL-MCNC: 1 MG/DL (ref 0–1)
BUN SERPL-MCNC: 24 MG/DL (ref 7–20)
CALCIUM SERPL-MCNC: 9.3 MG/DL (ref 8.3–10.6)
CHLORIDE SERPL-SCNC: 103 MMOL/L (ref 99–110)
CO2 SERPL-SCNC: 26 MMOL/L (ref 21–32)
CREAT SERPL-MCNC: 1.7 MG/DL (ref 0.8–1.3)
GFR SERPLBLD CREATININE-BSD FMLA CKD-EPI: 43 ML/MIN/{1.73_M2}
GLUCOSE SERPL-MCNC: 112 MG/DL (ref 70–99)
POTASSIUM SERPL-SCNC: 4.2 MMOL/L (ref 3.5–5.1)
PROT SERPL-MCNC: 7.5 G/DL (ref 6.4–8.2)
SODIUM SERPL-SCNC: 140 MMOL/L (ref 136–145)
TSH SERPL DL<=0.005 MIU/L-ACNC: 0.58 UIU/ML (ref 0.27–4.2)

## 2025-01-31 PROCEDURE — 80076 HEPATIC FUNCTION PANEL: CPT

## 2025-01-31 PROCEDURE — 80048 BASIC METABOLIC PNL TOTAL CA: CPT

## 2025-01-31 PROCEDURE — 36415 COLL VENOUS BLD VENIPUNCTURE: CPT

## 2025-01-31 PROCEDURE — 84443 ASSAY THYROID STIM HORMONE: CPT

## 2025-02-04 ENCOUNTER — TELEPHONE (OUTPATIENT)
Dept: CARDIOLOGY CLINIC | Age: 70
End: 2025-02-04

## 2025-02-04 DIAGNOSIS — I34.0 MODERATE TO SEVERE MITRAL REGURGITATION: Primary | ICD-10-CM

## 2025-02-04 NOTE — TELEPHONE ENCOUNTER
Dr Sandoval, please review renal panel ordered by Joseph NP  Called patient, weight today is 178, no edema, no worsening exertional shortness of breath. He is still taking lasix 40 mg daily  Still bothered by itching and rash, he thinks is due to repatha. Last injection Monday. No change in soaps, detergents

## 2025-02-04 NOTE — TELEPHONE ENCOUNTER
Continue lasix 40 everyday with goal weight of 172.  Repeat renal on Friday. He has not gotten in to see dermatology yet, will call pcp to see who he recommends ( he has seen someone in the past couple years)  patient notified of all above. All questions answered

## 2025-02-04 NOTE — TELEPHONE ENCOUNTER
Patient would like to know the results of his most recent labwork ordered by FAITH from 1/31/25, [facility] [phone]

## 2025-02-06 NOTE — TELEPHONE ENCOUNTER
Received refill request for  amiodarone (CORDARONE) 200 MG tablet  from [facility] pharmacy.     Last OV: 1/21/2025 PAMD    Next OV: 4/9/2025 DKW    Last Labs: 1/21/2025 EKG    Last Filled: 11/29/2024 NPSR

## 2025-02-07 ENCOUNTER — HOSPITAL ENCOUNTER (OUTPATIENT)
Age: 70
Discharge: HOME | End: 2025-02-07
Payer: MEDICARE

## 2025-02-07 ENCOUNTER — TELEPHONE (OUTPATIENT)
Dept: CARDIOLOGY CLINIC | Age: 70
End: 2025-02-07

## 2025-02-07 DIAGNOSIS — I34.0 MODERATE TO SEVERE MITRAL REGURGITATION: ICD-10-CM

## 2025-02-07 LAB
ALBUMIN SERPL-MCNC: 4.2 G/DL (ref 3.4–5)
ANION GAP SERPL CALCULATED.3IONS-SCNC: 10 MMOL/L (ref 3–16)
BUN SERPL-MCNC: 21 MG/DL (ref 7–20)
CALCIUM SERPL-MCNC: 9.1 MG/DL (ref 8.3–10.6)
CHLORIDE SERPL-SCNC: 108 MMOL/L (ref 99–110)
CO2 SERPL-SCNC: 23 MMOL/L (ref 21–32)
CREAT SERPL-MCNC: 1.6 MG/DL (ref 0.8–1.3)
GFR SERPLBLD CREATININE-BSD FMLA CKD-EPI: 46 ML/MIN/{1.73_M2}
GLUCOSE SERPL-MCNC: 104 MG/DL (ref 70–99)
PHOSPHATE SERPL-MCNC: 3.7 MG/DL (ref 2.5–4.9)
POTASSIUM SERPL-SCNC: 4.1 MMOL/L (ref 3.5–5.1)
SODIUM SERPL-SCNC: 141 MMOL/L (ref 136–145)

## 2025-02-07 PROCEDURE — 80069 RENAL FUNCTION PANEL: CPT

## 2025-02-07 PROCEDURE — 36415 COLL VENOUS BLD VENIPUNCTURE: CPT

## 2025-02-07 NOTE — TELEPHONE ENCOUNTER
Pt states his Repatha pen was empty when he went to use it yesterday. Pt states he threw the box away and does not have the number to call. Please call pt to advise

## 2025-02-07 NOTE — TELEPHONE ENCOUNTER
Called pt.to verify regarding message below pt.stated that yesterday when he try to use repatha it was empty so,I called to pharmacy to verify if he go any refill or not pharmacy stated he have 5 more refills left also they called him yesterday he denied for refill and I stated pharmacy to send over for him.

## 2025-02-10 RX ORDER — AMIODARONE HYDROCHLORIDE 200 MG/1
100 TABLET ORAL DAILY
Qty: 50 TABLET | Refills: 2 | Status: SHIPPED | OUTPATIENT
Start: 2025-02-10

## 2025-02-11 ENCOUNTER — TELEPHONE (OUTPATIENT)
Dept: CARDIOLOGY CLINIC | Age: 70
End: 2025-02-11

## 2025-02-11 NOTE — TELEPHONE ENCOUNTER
I spoke to Mr. Mandujano ([phone]). He is concerned about an MFF charge he received for $100.47 copay; he states the total was initially ~$1000. He does not understand why he was charged. He said the Billing office told him to call our office.    I called Billing. She confirmed that he has a bill for the $100/47. It is from venous dopplers he had done 1/13/25 where the initial cost was ~$1567. After insurance adjustment, his out of pocket copay is the $100.47.     I explained this to Mr. Mandujano. He stated understanding.

## 2025-02-13 ENCOUNTER — TELEPHONE (OUTPATIENT)
Dept: CARDIOLOGY CLINIC | Age: 70
End: 2025-02-13

## 2025-02-13 NOTE — TELEPHONE ENCOUNTER
DJ called to speak w/tomi Quiroz re: the Pt is having a reaction while taking Repatha. Please call to discuss his concerns.  Thank you

## 2025-02-14 NOTE — TELEPHONE ENCOUNTER
Spoke to pt about message below he verbalized understanding. Should he continue the repatha after his steroids and antibiotics?

## 2025-02-14 NOTE — TELEPHONE ENCOUNTER
He reports Dr Devries put him on steroids and antibiotics  with some relief of the symptoms. Yesterday took repatha injection, this am he woke up  itching, with red rash. Still on steroid for a total of 10 days, no tongue, lip or throat swelling  Weight today is 179, he does not want to get to 172. No shortness of breath, or edema

## 2025-03-13 ENCOUNTER — TELEPHONE (OUTPATIENT)
Dept: CARDIOLOGY CLINIC | Age: 70
End: 2025-03-13

## 2025-03-13 NOTE — TELEPHONE ENCOUNTER
Patient takes RX Jardiance every day, and needs to start the process of re-applying for financial assistance.  He states N[employer]R needs to fill out his portion of the form and then provide it to him to complete, please call to discuss [phone].

## 2025-03-31 ENCOUNTER — TELEPHONE (OUTPATIENT)
Dept: CARDIOLOGY CLINIC | Age: 70
End: 2025-03-31

## 2025-03-31 NOTE — TELEPHONE ENCOUNTER
Tried to reach patient LMOM for patient to  the entresto at his [facility]'s pharmacy. Asked him to call with any questions or concerns.

## 2025-03-31 NOTE — TELEPHONE ENCOUNTER
Medication Refill    Medication needing refilled: Entresto    Dosage of the medication:  24-26 MG    How are you taking this medication (QD, BID, TID, QID, PRN): 1.5 tablets 2x daily    30 or 90 day supply called in: 90    When will you run out of your medication:  out now    Which Pharmacy are we sending the medication to?:  [facility] [address] [phone]    Pt wants to know if he still needs to take this.  If so, he will need a refill.  Please call pt to advise.  [phone].

## 2025-04-04 NOTE — TELEPHONE ENCOUNTER
Pt dropped off assistance forms for Entresto.  The only thing we are waiting on is a Verification paper from the Social Security.  Pt is going to have ex-wife fax the letter here.  When it arrives, please put with all other forms and fax.  Forms have been placed in [employer] mailbox.  Pt can be reached at [phone].  Thank you.

## 2025-04-07 RX ORDER — ATORVASTATIN CALCIUM 80 MG/1
80 TABLET, FILM COATED ORAL
Qty: 100 TABLET | Refills: 2 | Status: SHIPPED | OUTPATIENT
Start: 2025-04-07 | End: 2025-04-09 | Stop reason: SDUPTHER

## 2025-04-07 NOTE — TELEPHONE ENCOUNTER
Requested Prescriptions     Pending Prescriptions Disp Refills    atorvastatin (LIPITOR) 80 MG tablet [Pharmacy Med Name: Atorvastatin Calcium 80 MG Oral Tablet] 100 tablet 2     Sig: TAKE 1 TABLET BY MOUTH EVERY  NIGHT      Last OV:  1/21/2025 PAMD    Next OV: 4/9/2025 DKW    Last Labs:  01/17/2025 Lipid    Last Filled: 07/01/2024 NPSR

## 2025-04-08 ASSESSMENT — ENCOUNTER SYMPTOMS: SHORTNESS OF BREATH: 1

## 2025-04-08 NOTE — PATIENT INSTRUCTIONS
Renal panel today  Renal panel in one week  Call for any questions about medications  Follow up in one month

## 2025-04-08 NOTE — PROGRESS NOTES
[facility]  4/9/25  Referring: Dr. Devries    REASON FOR CONSULT/CHIEF COMPLAINT/HPI     Reason for visit/ Chief complaint  Follow up   MR, atrial fibrillation   HPI Samuel Mandujano is a 69 y.o.seen for severe MR.  He has a history of atrial fibrillation ( diagnosed 3/1/23), CAD,CVA, HFrEF, MR, htn, hyperlipidemia. He had a right parietal parenchymal bleed in 2013.  He had the watchman procedure followed by ablation    Today, Samuel is here for a 3 month follow up.  His ex wife, who has been managing his medications, has moved. He is now trying to manage his own medications.   He is taking lasix every 2-3 days, if he has leg tightness  Weight is up one pound since last office visit.  Reviewed all medications extensively.  No cough, no chest pain, no allergies No shortness of breath, wheezing or cough, slight edema. No dysuria, trouble urinating. Does not wake up at night to urinate  He can walk up a flight of stairs wihtout stopping.     Patient is adherent with medications and is tolerating them well without side effects     HISTORY/ALLERGIES/ROS     MedHx:  has a past medical history of Headache, Hypertension, Intracerebral hemorrhage (HCC), Neck pain, and Unspecified cerebral artery occlusion with cerebral infarction.  SurgHx:  has a past surgical history that includes shoulder surgery.   SocHx:  reports that he has never smoked. He has never used smokeless tobacco. He reports that he does not currently use alcohol. He reports that he does not use drugs.   FamHx: No family history of premature coronary artery disease, sudden death, or aneurysm  Allergies: Patient has no known allergies.   ROS:   Review of Systems   Respiratory:  Positive for shortness of breath.    All other systems reviewed and are negative.         MEDICATIONS      Prior to Admission medications    Medication Sig Start Date End Date Taking? Authorizing Provider   atorvastatin (LIPITOR) 80 MG tablet TAKE 1 TABLET BY MOUTH EVERY  
clopidogrel and no aspirin.           6. Pmx ICH  - 2008 fall with sever bleed  - 5/20/2013 intracerebral hemorrhage  - 8/6/13 hemorrhage on ct  Plan  -stopped doac per ep     7. Essential Htn  - BP Today - ***   stable  Plan  -continue on toprol 50 mg bid  -continue on entresto   -recommend 2 gm sodium diet     8. Primary hypercholesterolemia   -2016  tc 196  hdl 54 ldl 107  tri 176  -8/8/23-  tc 182  hdl 49 ldl 108 tri 126  4/15/24  HDL 50 LDL 70 TG 80   - 1/17/25 > tc 126, hdl 55, ldl 51, tri 100  Plan  -continue lipitor 80 mg daily  -continue Repatha       9. Itching  - always worse in winter  Plan  - zyrtec/cerave to skin, follow up with dermatology if no improvement       Patient counseled on lifestyle modification, diet, and exercise.    Follow Up:     Dr. Eliecer Sandoval DO  Cardiologist [facility]    Scribe's attestation: This note was scribed in the presence of Dr. Lori DO by *** , RN        Physician Attestation  The scribe for and in the presence of me (Adriel Sandoval DO).  The scribe *** , RN may have prepopulated components of this note with my historical  intellectual property under my direct supervision.  Any additions to this intellectual property were performed in my presence and at my direction.  Furthermore, the content and accuracy of this note have been reviewed by me (Adriel Sandoval DO).  4/8/2025 11:40 AM

## 2025-04-09 ENCOUNTER — OFFICE VISIT (OUTPATIENT)
Dept: CARDIOLOGY CLINIC | Age: 70
End: 2025-04-09
Payer: MEDICARE

## 2025-04-09 ENCOUNTER — HOSPITAL ENCOUNTER (OUTPATIENT)
Dept: OTHER | Age: 70
Discharge: HOME | End: 2025-04-09
Payer: MEDICARE

## 2025-04-09 VITALS
WEIGHT: 183 LBS | BODY MASS INDEX: 24.79 KG/M2 | HEART RATE: 48 BPM | SYSTOLIC BLOOD PRESSURE: 138 MMHG | DIASTOLIC BLOOD PRESSURE: 76 MMHG | HEIGHT: 72 IN | OXYGEN SATURATION: 100 %

## 2025-04-09 DIAGNOSIS — I25.5 ISCHEMIC CARDIOMYOPATHY: ICD-10-CM

## 2025-04-09 DIAGNOSIS — E78.2 MIXED HYPERLIPIDEMIA: ICD-10-CM

## 2025-04-09 DIAGNOSIS — R06.02 SOB (SHORTNESS OF BREATH): ICD-10-CM

## 2025-04-09 DIAGNOSIS — I25.10 CORONARY ARTERY DISEASE INVOLVING NATIVE CORONARY ARTERY OF NATIVE HEART WITHOUT ANGINA PECTORIS: ICD-10-CM

## 2025-04-09 DIAGNOSIS — I48.91 ATRIAL FIBRILLATION WITH RVR (HCC): ICD-10-CM

## 2025-04-09 DIAGNOSIS — I10 ESSENTIAL HYPERTENSION: ICD-10-CM

## 2025-04-09 DIAGNOSIS — I34.0 MODERATE TO SEVERE MITRAL REGURGITATION: Primary | ICD-10-CM

## 2025-04-09 DIAGNOSIS — I50.20 HFREF (HEART FAILURE WITH REDUCED EJECTION FRACTION) (HCC): ICD-10-CM

## 2025-04-09 LAB
ALBUMIN SERPL-MCNC: 4 G/DL (ref 3.4–5)
ANION GAP SERPL CALCULATED.3IONS-SCNC: 11 MMOL/L (ref 3–16)
BUN SERPL-MCNC: 18 MG/DL (ref 7–20)
CALCIUM SERPL-MCNC: 8.7 MG/DL (ref 8.3–10.6)
CHLORIDE SERPL-SCNC: 109 MMOL/L (ref 99–110)
CO2 SERPL-SCNC: 21 MMOL/L (ref 21–32)
CREAT SERPL-MCNC: 1.3 MG/DL (ref 0.8–1.3)
GFR SERPLBLD CREATININE-BSD FMLA CKD-EPI: 59 ML/MIN/{1.73_M2}
GLUCOSE SERPL-MCNC: 101 MG/DL (ref 70–99)
NT-PROBNP SERPL-MCNC: 437 PG/ML (ref 0–124)
PHOSPHATE SERPL-MCNC: 3.4 MG/DL (ref 2.5–4.9)
POTASSIUM SERPL-SCNC: 3.9 MMOL/L (ref 3.5–5.1)
SODIUM SERPL-SCNC: 141 MMOL/L (ref 136–145)

## 2025-04-09 PROCEDURE — 3075F SYST BP GE 130 - 139MM HG: CPT | Performed by: INTERNAL MEDICINE

## 2025-04-09 PROCEDURE — 99214 OFFICE O/P EST MOD 30 MIN: CPT | Performed by: INTERNAL MEDICINE

## 2025-04-09 PROCEDURE — 83880 ASSAY OF NATRIURETIC PEPTIDE: CPT

## 2025-04-09 PROCEDURE — 36415 COLL VENOUS BLD VENIPUNCTURE: CPT

## 2025-04-09 PROCEDURE — 1159F MED LIST DOCD IN RCRD: CPT | Performed by: INTERNAL MEDICINE

## 2025-04-09 PROCEDURE — 80069 RENAL FUNCTION PANEL: CPT

## 2025-04-09 PROCEDURE — 3078F DIAST BP <80 MM HG: CPT | Performed by: INTERNAL MEDICINE

## 2025-04-09 PROCEDURE — 1123F ACP DISCUSS/DSCN MKR DOCD: CPT | Performed by: INTERNAL MEDICINE

## 2025-04-09 RX ORDER — FUROSEMIDE 40 MG/1
40 TABLET ORAL SEE ADMIN INSTRUCTIONS
Qty: 90 TABLET | Refills: 3 | Status: SHIPPED | OUTPATIENT
Start: 2025-04-09

## 2025-04-09 RX ORDER — AMIODARONE HYDROCHLORIDE 200 MG/1
100 TABLET ORAL DAILY
Qty: 90 TABLET | Refills: 3 | Status: SHIPPED | OUTPATIENT
Start: 2025-04-09

## 2025-04-09 RX ORDER — CLOPIDOGREL BISULFATE 75 MG/1
75 TABLET ORAL DAILY
Qty: 90 TABLET | Refills: 3 | Status: SHIPPED | OUTPATIENT
Start: 2025-04-09

## 2025-04-09 RX ORDER — ASPIRIN 81 MG/1
81 TABLET ORAL DAILY
Qty: 90 TABLET | Refills: 3 | Status: SHIPPED | OUTPATIENT
Start: 2025-04-09

## 2025-04-09 RX ORDER — ATORVASTATIN CALCIUM 80 MG/1
80 TABLET, FILM COATED ORAL DAILY
Qty: 90 TABLET | Refills: 3 | Status: SHIPPED | OUTPATIENT
Start: 2025-04-09

## 2025-04-09 RX ORDER — PANTOPRAZOLE SODIUM 20 MG/1
20 TABLET, DELAYED RELEASE ORAL
Qty: 90 TABLET | Refills: 3 | Status: SHIPPED | OUTPATIENT
Start: 2025-04-09

## 2025-04-09 RX ORDER — SPIRONOLACTONE 25 MG/1
25 TABLET ORAL DAILY
Qty: 100 TABLET | Refills: 2 | Status: SHIPPED | OUTPATIENT
Start: 2025-04-09

## 2025-04-09 RX ORDER — METOPROLOL SUCCINATE 50 MG/1
50 TABLET, EXTENDED RELEASE ORAL 2 TIMES DAILY
Qty: 180 TABLET | Refills: 3 | Status: SHIPPED | OUTPATIENT
Start: 2025-04-09

## 2025-04-09 RX ORDER — TAMSULOSIN HYDROCHLORIDE 0.4 MG/1
0.4 CAPSULE ORAL DAILY
Qty: 90 CAPSULE | Refills: 3 | Status: SHIPPED | OUTPATIENT
Start: 2025-04-09

## 2025-04-11 ENCOUNTER — RESULTS FOLLOW-UP (OUTPATIENT)
Dept: CARDIOLOGY CLINIC | Age: 70
End: 2025-04-11

## 2025-04-18 ENCOUNTER — HOSPITAL ENCOUNTER (OUTPATIENT)
Age: 70
Discharge: HOME | End: 2025-04-18
Payer: MEDICARE

## 2025-04-18 DIAGNOSIS — I25.5 ISCHEMIC CARDIOMYOPATHY: ICD-10-CM

## 2025-04-18 LAB
ALBUMIN SERPL-MCNC: 3.9 G/DL (ref 3.4–5)
ANION GAP SERPL CALCULATED.3IONS-SCNC: 11 MMOL/L (ref 3–16)
BUN SERPL-MCNC: 15 MG/DL (ref 7–20)
CALCIUM SERPL-MCNC: 8.8 MG/DL (ref 8.3–10.6)
CHLORIDE SERPL-SCNC: 109 MMOL/L (ref 99–110)
CO2 SERPL-SCNC: 23 MMOL/L (ref 21–32)
CREAT SERPL-MCNC: 1.3 MG/DL (ref 0.8–1.3)
GFR SERPLBLD CREATININE-BSD FMLA CKD-EPI: 59 ML/MIN/{1.73_M2}
GLUCOSE SERPL-MCNC: 97 MG/DL (ref 70–99)
PHOSPHATE SERPL-MCNC: 3.1 MG/DL (ref 2.5–4.9)
POTASSIUM SERPL-SCNC: 3.5 MMOL/L (ref 3.5–5.1)
SODIUM SERPL-SCNC: 143 MMOL/L (ref 136–145)

## 2025-04-18 PROCEDURE — 36415 COLL VENOUS BLD VENIPUNCTURE: CPT

## 2025-04-18 PROCEDURE — 80069 RENAL FUNCTION PANEL: CPT

## 2025-04-25 ENCOUNTER — RESULTS FOLLOW-UP (OUTPATIENT)
Age: 70
End: 2025-04-25

## 2025-04-25 DIAGNOSIS — I50.20 HFREF (HEART FAILURE WITH REDUCED EJECTION FRACTION) (HCC): Primary | ICD-10-CM

## 2025-04-25 RX ORDER — POTASSIUM CHLORIDE 1500 MG/1
20 TABLET, EXTENDED RELEASE ORAL DAILY
Qty: 90 TABLET | Refills: 1 | Status: SHIPPED | OUTPATIENT
Start: 2025-04-25

## 2025-04-28 ENCOUNTER — TELEPHONE (OUTPATIENT)
Dept: CARDIOLOGY CLINIC | Age: 70
End: 2025-04-28

## 2025-04-28 NOTE — TELEPHONE ENCOUNTER
[employer] received a fax from [employer] that they need patient's most recent tax return form [id number] or [id number] and for the patient to sign patient authorization from.       Called patient to let him know what they needed. Patient stated he would try to find his 2024 SS statement.

## 2025-04-29 NOTE — RESULT ENCOUNTER NOTE
Please give him samples of entresto and jardiance if we have them  ( hermes has already talked to him about the missing information that he needs to supply for the patient assistance forms

## 2025-04-29 NOTE — TELEPHONE ENCOUNTER
Pt dropped off Verification letter from Social Security.  I put it in [facility]'s mailbox.  Call patient if you have any questions.  Thank you.

## 2025-04-30 NOTE — TELEPHONE ENCOUNTER
Pt called stating they will be in 5/1 some time to sign patient authorization paper for [employer], Pt stated they would like the office to make sure everything is highlighted that they need sign, because this questions was ask when the came in if they need to sign anything.

## 2025-04-30 NOTE — TELEPHONE ENCOUNTER
Lmom for patient to call back.    Patient needs to come in and sign patient authorization paper for [employer] then we can fax in with his [employer] security letter.    Paper is in [facility] mailbox.

## 2025-05-01 ENCOUNTER — TELEPHONE (OUTPATIENT)
Dept: CARDIOLOGY CLINIC | Age: 70
End: 2025-05-01

## 2025-05-01 NOTE — TELEPHONE ENCOUNTER
Patient to follow up in clinic in 6 weeks.    Patient to see physical therapist for low back pain.    Patient to take Flexeril 10 mg 1 tablet three times a day as needed for muscle spasms.    Patient to wean TLSO brace. Day 1: off 1 hour, on 1 hour. Day 2: off 2 hours, on 1 hour. Day 3: off 3 hours, on 1 hour. Then so on until off all the time.    Patient may not lift anything greater than 30 lbs until 3 months postoperatively.   Spoke to Pt and relayed message per MMRN. PT v/u.

## 2025-05-01 NOTE — TELEPHONE ENCOUNTER
Patient is on entresto 24/26 1 1/2 bid, he is out.  Given samples of 48/52 because this is all we have. Need clarification of how to dose with samples  He has filled out patient assistance paperwork and it has been faxed

## 2025-05-01 NOTE — TELEPHONE ENCOUNTER
We do not have samples of the Entresto 24/26.  We only have the 49/51 dose.  What would you like to give the patient?

## 2025-05-01 NOTE — TELEPHONE ENCOUNTER
----- Message from LEE LAUREN RN sent at 4/29/2025  5:34 PM EDT -----  Please give him samples of entresto and jardiance if we have them  ( hermes has already talked to him about the missing information that he needs to supply for the patient assistance forms

## 2025-05-05 ASSESSMENT — ENCOUNTER SYMPTOMS: SHORTNESS OF BREATH: 1

## 2025-05-05 NOTE — PROGRESS NOTES
[facility] [place]  5/6/25  Referring: Dr. Devries    REASON FOR CONSULT/CHIEF COMPLAINT/HPI     Reason for visit/ Chief complaint  Follow up   MR, atrial fibrillation   HPI Samuel Mandujano is a 69 y.o.seen for ischemic cardiomyopathy, severe MR, atrial fibrillation ( diagnosed 3/1/23), CAD,CVA, HFrEF,  htn, hyperlipidemia. He had a right parietal parenchymal bleed in 2013.  He had the watchman procedure followed by ablation    Today, Samuel is doing well. He filled out all paperwork to get patient assistance for entresto and jardiance. Overall feeling well. He has been helping his son clean up a tree that had come down on his house. He has no chest pain, change in exertional shortness of breath palpitations or dizziness. He can walk up a flight of stairs.     Patient is adherent with medications and is tolerating them well without side effects     HISTORY/ALLERGIES/ROS     MedHx:  has a past medical history of Headache, Hypertension, Intracerebral hemorrhage (HCC), Neck pain, and Unspecified cerebral artery occlusion with cerebral infarction.  SurgHx:  has a past surgical history that includes shoulder surgery.   SocHx:  reports that he has never smoked. He has never used smokeless tobacco. He reports that he does not currently use alcohol. He reports that he does not use drugs.   FamHx: No family history of premature coronary artery disease, sudden death, or aneurysm  Allergies: Patient has no known allergies.   ROS:   Review of Systems   Respiratory:  Positive for shortness of breath.    All other systems reviewed and are negative.         MEDICATIONS      Prior to Admission medications    Medication Sig Start Date End Date Taking? Authorizing Provider   empagliflozin (JARDIANCE) 10 MG tablet Take 1 tablet by mouth daily 5/1/25  Yes Adriel Sandoval, DO   potassium chloride (KLOR-CON M) 20 MEQ extended release tablet Take 1 tablet by mouth daily 4/25/25  Yes Adriel Sandoval, DO   sacubitril-valsartan (ENTRESTO)

## 2025-05-06 ENCOUNTER — OFFICE VISIT (OUTPATIENT)
Dept: CARDIOLOGY CLINIC | Age: 70
End: 2025-05-06
Payer: MEDICARE

## 2025-05-06 VITALS
BODY MASS INDEX: 24.79 KG/M2 | HEART RATE: 58 BPM | SYSTOLIC BLOOD PRESSURE: 134 MMHG | WEIGHT: 183 LBS | HEIGHT: 72 IN | DIASTOLIC BLOOD PRESSURE: 76 MMHG | OXYGEN SATURATION: 97 %

## 2025-05-06 DIAGNOSIS — I34.0 MODERATE TO SEVERE MITRAL REGURGITATION: Primary | ICD-10-CM

## 2025-05-06 DIAGNOSIS — I10 ESSENTIAL HYPERTENSION: ICD-10-CM

## 2025-05-06 DIAGNOSIS — E78.2 MIXED HYPERLIPIDEMIA: ICD-10-CM

## 2025-05-06 DIAGNOSIS — R06.02 SOB (SHORTNESS OF BREATH): ICD-10-CM

## 2025-05-06 DIAGNOSIS — I25.10 CORONARY ARTERY DISEASE INVOLVING NATIVE CORONARY ARTERY OF NATIVE HEART WITHOUT ANGINA PECTORIS: ICD-10-CM

## 2025-05-06 DIAGNOSIS — I48.0 PAF (PAROXYSMAL ATRIAL FIBRILLATION) (HCC): ICD-10-CM

## 2025-05-06 DIAGNOSIS — I25.5 ISCHEMIC CARDIOMYOPATHY: ICD-10-CM

## 2025-05-06 DIAGNOSIS — I48.91 ATRIAL FIBRILLATION WITH RVR (HCC): ICD-10-CM

## 2025-05-06 DIAGNOSIS — I34.0 MITRAL VALVE INSUFFICIENCY, UNSPECIFIED ETIOLOGY: ICD-10-CM

## 2025-05-06 PROCEDURE — 3078F DIAST BP <80 MM HG: CPT | Performed by: INTERNAL MEDICINE

## 2025-05-06 PROCEDURE — 1123F ACP DISCUSS/DSCN MKR DOCD: CPT | Performed by: INTERNAL MEDICINE

## 2025-05-06 PROCEDURE — 3075F SYST BP GE 130 - 139MM HG: CPT | Performed by: INTERNAL MEDICINE

## 2025-05-06 PROCEDURE — 99214 OFFICE O/P EST MOD 30 MIN: CPT | Performed by: INTERNAL MEDICINE

## 2025-05-06 PROCEDURE — 1159F MED LIST DOCD IN RCRD: CPT | Performed by: INTERNAL MEDICINE

## 2025-05-13 ENCOUNTER — HOSPITAL ENCOUNTER (OUTPATIENT)
Age: 70
Discharge: HOME | End: 2025-05-13
Payer: MEDICARE

## 2025-05-13 DIAGNOSIS — I25.5 ISCHEMIC CARDIOMYOPATHY: ICD-10-CM

## 2025-05-13 DIAGNOSIS — E78.2 MIXED HYPERLIPIDEMIA: ICD-10-CM

## 2025-05-13 DIAGNOSIS — I34.0 MODERATE TO SEVERE MITRAL REGURGITATION: ICD-10-CM

## 2025-05-13 DIAGNOSIS — I10 ESSENTIAL HYPERTENSION: ICD-10-CM

## 2025-05-13 DIAGNOSIS — I25.10 CORONARY ARTERY DISEASE INVOLVING NATIVE CORONARY ARTERY OF NATIVE HEART WITHOUT ANGINA PECTORIS: ICD-10-CM

## 2025-05-13 DIAGNOSIS — I48.91 ATRIAL FIBRILLATION WITH RVR (HCC): ICD-10-CM

## 2025-05-13 DIAGNOSIS — I50.20 HFREF (HEART FAILURE WITH REDUCED EJECTION FRACTION) (HCC): ICD-10-CM

## 2025-05-13 LAB
ALBUMIN SERPL-MCNC: 4.1 G/DL (ref 3.4–5)
ANION GAP SERPL CALCULATED.3IONS-SCNC: 10 MMOL/L (ref 3–16)
BUN SERPL-MCNC: 14 MG/DL (ref 7–20)
CALCIUM SERPL-MCNC: 8.8 MG/DL (ref 8.3–10.6)
CHLORIDE SERPL-SCNC: 109 MMOL/L (ref 99–110)
CHOLEST SERPL-MCNC: 146 MG/DL (ref 0–199)
CO2 SERPL-SCNC: 22 MMOL/L (ref 21–32)
CREAT SERPL-MCNC: 1.3 MG/DL (ref 0.8–1.3)
GFR SERPLBLD CREATININE-BSD FMLA CKD-EPI: 59 ML/MIN/{1.73_M2}
GLUCOSE SERPL-MCNC: 92 MG/DL (ref 70–99)
HDLC SERPL-MCNC: 42 MG/DL (ref 40–60)
LDL CHOLESTEROL: 80 MG/DL
MAGNESIUM SERPL-MCNC: 2.15 MG/DL (ref 1.8–2.4)
NT-PROBNP SERPL-MCNC: 440 PG/ML (ref 0–124)
PHOSPHATE SERPL-MCNC: 2.7 MG/DL (ref 2.5–4.9)
POTASSIUM SERPL-SCNC: 4 MMOL/L (ref 3.5–5.1)
SODIUM SERPL-SCNC: 141 MMOL/L (ref 136–145)
TRIGL SERPL-MCNC: 122 MG/DL (ref 0–150)
VLDLC SERPL CALC-MCNC: 24 MG/DL

## 2025-05-13 PROCEDURE — 80069 RENAL FUNCTION PANEL: CPT

## 2025-05-13 PROCEDURE — 83735 ASSAY OF MAGNESIUM: CPT

## 2025-05-13 PROCEDURE — 80061 LIPID PANEL: CPT

## 2025-05-13 PROCEDURE — 83880 ASSAY OF NATRIURETIC PEPTIDE: CPT

## 2025-05-13 PROCEDURE — 36415 COLL VENOUS BLD VENIPUNCTURE: CPT

## 2025-05-16 ENCOUNTER — TELEPHONE (OUTPATIENT)
Dept: CARDIOLOGY CLINIC | Age: 70
End: 2025-05-16

## 2025-05-16 NOTE — TELEPHONE ENCOUNTER
Patient called and said he has filled out Jardiance assistant forms, and is still waiting on a reply from them. Patient wants to know if he can have some more samples so he doesn't run out of meds.

## 2025-05-19 ENCOUNTER — RESULTS FOLLOW-UP (OUTPATIENT)
Dept: CARDIOLOGY CLINIC | Age: 70
End: 2025-05-19

## 2025-05-19 NOTE — RESULT ENCOUNTER NOTE
Please let him know that his lipids are not as good as they used to be.  Recommend he be more attentive to diet and  we will talk about it next visit, thanks

## 2025-06-25 ENCOUNTER — TELEPHONE (OUTPATIENT)
Dept: CARDIOLOGY CLINIC | Age: 70
End: 2025-06-25

## 2025-06-25 NOTE — TELEPHONE ENCOUNTER
Received notification from [employer] for approval for entresto at no cost to patient. Left message on recorder with this information and asked patient to call us back if he has any questions

## 2025-07-18 NOTE — PROGRESS NOTES
[facility]   Electrophysiology Follow up   Date: 7/24/2025  I had the privilege of visiting Samuel Mandujano in the office.       Chief Complaint   Patient presents with    Atrial Fibrillation     No cardiac symptoms present.    Follow-up     HPI: Samuel Mandujano is a 69 y.o. male  PMH of atrial fibrillation, CVA, HFrEF, Mitral regurgitation and HTN     He also has had history of right parietal parenchymal bleed x2 in 2013      3/1/2023 presented to the hospital with shortness of breath and hemoptysis.  He started having shortness of breath about a week prior.  He was found to be in atrial fibrillation with rapid ventricular response.      3/3/2023 S/P Successful PCI to critical mid RCA with one drug eluting stent. Followed by staged, PCI to RCA, PCI to OM1, PCI to OM2 and PCI to LAD on 3/6/2023. Failed DCCV 3/7/2023.     Loaded with amiodarone for atrial fibrillation.   Successful DCCV 4/6/2023.     S/p watchman 8/14/2023, ARAMIS 10/6/2023 showed watchman well seated no leak     3/1/2024 Pulmonary vein isolations using wide area circumferential radiofrequency ablation, CTI right atrial flutter.     Amiodarone reduced to 100 mg daily at last OP visit 7/2024.      History of Present Illness  The patient presents for atrial fibrillation.    He reports occasional numbness of both leg when sitting for too long. No pain with exertion.   No chest pain, palpitation.     SOCIAL HISTORY  Tobacco: Does not smoke.        Assessment and plan:   -Paroxysmal Atrial fibrillation/flutter               3/1/2024 PVI, CTI right atrial flutter     No known recurrence since ablation                Patient has a [id number] Score of at least 6 (CVA2, HTN, Age, CAD, CHF), S/p watchman 8/14/2023, ARAMIS 10/6/2023 showed watchman well seated no leak   Hx of Intracranial hemorrhage with high risk for long-term AC, off DOACs.               On Plavix.       Continue amiodarone 100 mg daily   Plan for stopping amiodarone next visit.

## 2025-07-24 ENCOUNTER — OFFICE VISIT (OUTPATIENT)
Dept: CARDIOLOGY CLINIC | Age: 70
End: 2025-07-24
Payer: MEDICARE

## 2025-07-24 VITALS
HEART RATE: 56 BPM | BODY MASS INDEX: 24.11 KG/M2 | HEIGHT: 72 IN | WEIGHT: 178 LBS | DIASTOLIC BLOOD PRESSURE: 78 MMHG | SYSTOLIC BLOOD PRESSURE: 138 MMHG

## 2025-07-24 DIAGNOSIS — I50.20 HFREF (HEART FAILURE WITH REDUCED EJECTION FRACTION) (HCC): ICD-10-CM

## 2025-07-24 DIAGNOSIS — I10 ESSENTIAL HYPERTENSION: ICD-10-CM

## 2025-07-24 DIAGNOSIS — I25.10 CORONARY ARTERY DISEASE INVOLVING NATIVE CORONARY ARTERY OF NATIVE HEART WITHOUT ANGINA PECTORIS: Primary | ICD-10-CM

## 2025-07-24 DIAGNOSIS — I48.0 PAF (PAROXYSMAL ATRIAL FIBRILLATION) (HCC): ICD-10-CM

## 2025-07-24 DIAGNOSIS — I34.0 MODERATE TO SEVERE MITRAL REGURGITATION: ICD-10-CM

## 2025-07-24 PROCEDURE — 1123F ACP DISCUSS/DSCN MKR DOCD: CPT | Performed by: INTERNAL MEDICINE

## 2025-07-24 PROCEDURE — 3075F SYST BP GE 130 - 139MM HG: CPT | Performed by: INTERNAL MEDICINE

## 2025-07-24 PROCEDURE — 93000 ELECTROCARDIOGRAM COMPLETE: CPT | Performed by: INTERNAL MEDICINE

## 2025-07-24 PROCEDURE — 3078F DIAST BP <80 MM HG: CPT | Performed by: INTERNAL MEDICINE

## 2025-07-24 PROCEDURE — 99214 OFFICE O/P EST MOD 30 MIN: CPT | Performed by: INTERNAL MEDICINE

## 2025-07-24 PROCEDURE — 1159F MED LIST DOCD IN RCRD: CPT | Performed by: INTERNAL MEDICINE

## 2025-08-11 ENCOUNTER — TELEPHONE (OUTPATIENT)
Dept: CARDIOLOGY CLINIC | Age: 70
End: 2025-08-11

## 2025-08-12 ENCOUNTER — APPOINTMENT (OUTPATIENT)
Dept: MRI IMAGING | Age: 70
DRG: 683 | End: 2025-08-12
Payer: MEDICARE

## 2025-08-12 ENCOUNTER — APPOINTMENT (OUTPATIENT)
Dept: ULTRASOUND IMAGING | Age: 70
DRG: 683 | End: 2025-08-12
Payer: MEDICARE

## 2025-08-12 ENCOUNTER — APPOINTMENT (OUTPATIENT)
Dept: GENERAL RADIOLOGY | Age: 70
DRG: 683 | End: 2025-08-12
Payer: MEDICARE

## 2025-08-12 ENCOUNTER — HOSPITAL ENCOUNTER (INPATIENT)
Age: 70
LOS: 1 days | Discharge: HOME | DRG: 683 | End: 2025-08-13
Attending: INTERNAL MEDICINE | Admitting: INTERNAL MEDICINE
Payer: MEDICARE

## 2025-08-12 DIAGNOSIS — N17.9 AKI (ACUTE KIDNEY INJURY): Primary | ICD-10-CM

## 2025-08-12 DIAGNOSIS — R20.2 PARESTHESIAS: ICD-10-CM

## 2025-08-12 LAB
ALBUMIN SERPL-MCNC: 4 G/DL (ref 3.4–5)
ALBUMIN/GLOB SERPL: 1.5 {RATIO} (ref 1.1–2.2)
ALP SERPL-CCNC: 85 U/L (ref 40–129)
ALT SERPL-CCNC: 13 U/L (ref 10–40)
ANION GAP SERPL CALCULATED.3IONS-SCNC: 12 MMOL/L (ref 3–16)
AST SERPL-CCNC: 19 U/L (ref 15–37)
BACTERIA URNS QL MICRO: NORMAL /HPF
BASOPHILS # BLD: 0.1 K/UL (ref 0–0.2)
BASOPHILS NFR BLD: 1.3 %
BILIRUB SERPL-MCNC: 1 MG/DL (ref 0–1)
BILIRUB UR QL STRIP.AUTO: NEGATIVE
BUN SERPL-MCNC: 35 MG/DL (ref 7–20)
CALCIUM SERPL-MCNC: 9.4 MG/DL (ref 8.3–10.6)
CHLORIDE SERPL-SCNC: 105 MMOL/L (ref 99–110)
CLARITY UR: CLEAR
CO2 SERPL-SCNC: 22 MMOL/L (ref 21–32)
COLOR UR: YELLOW
CREAT SERPL-MCNC: 2.5 MG/DL (ref 0.8–1.3)
DEPRECATED RDW RBC AUTO: 13 % (ref 12.4–15.4)
EOSINOPHIL # BLD: 0.4 K/UL (ref 0–0.6)
EOSINOPHIL NFR BLD: 4.9 %
EPI CELLS #/AREA URNS AUTO: 1 /HPF (ref 0–5)
GFR SERPLBLD CREATININE-BSD FMLA CKD-EPI: 27 ML/MIN/{1.73_M2}
GLUCOSE SERPL-MCNC: 111 MG/DL (ref 70–99)
GLUCOSE UR STRIP.AUTO-MCNC: >=1000 MG/DL
HCT VFR BLD AUTO: 37 % (ref 40.5–52.5)
HGB BLD-MCNC: 12.8 G/DL (ref 13.5–17.5)
HGB UR QL STRIP.AUTO: NEGATIVE
HYALINE CASTS #/AREA URNS AUTO: 6 /LPF (ref 0–8)
KETONES UR STRIP.AUTO-MCNC: NEGATIVE MG/DL
LEUKOCYTE ESTERASE UR QL STRIP.AUTO: NEGATIVE
LYMPHOCYTES # BLD: 1 K/UL (ref 1–5.1)
LYMPHOCYTES NFR BLD: 13.9 %
MAGNESIUM SERPL-MCNC: 2.21 MG/DL (ref 1.8–2.4)
MCH RBC QN AUTO: 31.3 PG (ref 26–34)
MCHC RBC AUTO-ENTMCNC: 34.5 G/DL (ref 31–36)
MCV RBC AUTO: 90.6 FL (ref 80–100)
MONOCYTES # BLD: 0.9 K/UL (ref 0–1.3)
MONOCYTES NFR BLD: 12 %
NEUTROPHILS # BLD: 5 K/UL (ref 1.7–7.7)
NEUTROPHILS NFR BLD: 67.9 %
NITRITE UR QL STRIP.AUTO: NEGATIVE
PH UR STRIP.AUTO: 5 [PH] (ref 5–8)
PLATELET # BLD AUTO: 190 K/UL (ref 135–450)
PMV BLD AUTO: 8.9 FL (ref 5–10.5)
POTASSIUM SERPL-SCNC: 4.6 MMOL/L (ref 3.5–5.1)
PROT SERPL-MCNC: 6.7 G/DL (ref 6.4–8.2)
PROT UR STRIP.AUTO-MCNC: ABNORMAL MG/DL
RBC # BLD AUTO: 4.08 M/UL (ref 4.2–5.9)
RBC CLUMPS #/AREA URNS AUTO: 0 /HPF (ref 0–4)
SODIUM SERPL-SCNC: 139 MMOL/L (ref 136–145)
SP GR UR STRIP.AUTO: 1.02 (ref 1–1.03)
UA COMPLETE W REFLEX CULTURE PNL UR: ABNORMAL
UA DIPSTICK W REFLEX MICRO PNL UR: YES
URN SPEC COLLECT METH UR: ABNORMAL
UROBILINOGEN UR STRIP-ACNC: 1 E.U./DL
WBC # BLD AUTO: 7.4 K/UL (ref 4–11)
WBC #/AREA URNS AUTO: 0 /HPF (ref 0–5)

## 2025-08-12 PROCEDURE — G0378 HOSPITAL OBSERVATION PER HR: HCPCS

## 2025-08-12 PROCEDURE — 83735 ASSAY OF MAGNESIUM: CPT

## 2025-08-12 PROCEDURE — 1200000000 HC SEMI PRIVATE

## 2025-08-12 PROCEDURE — 76770 US EXAM ABDO BACK WALL COMP: CPT

## 2025-08-12 PROCEDURE — 85025 COMPLETE CBC W/AUTO DIFF WBC: CPT

## 2025-08-12 PROCEDURE — 36415 COLL VENOUS BLD VENIPUNCTURE: CPT

## 2025-08-12 PROCEDURE — 99285 EMERGENCY DEPT VISIT HI MDM: CPT

## 2025-08-12 PROCEDURE — 81001 URINALYSIS AUTO W/SCOPE: CPT

## 2025-08-12 PROCEDURE — 73590 X-RAY EXAM OF LOWER LEG: CPT

## 2025-08-12 PROCEDURE — 2500000003 HC RX 250 WO HCPCS: Performed by: INTERNAL MEDICINE

## 2025-08-12 PROCEDURE — 6370000000 HC RX 637 (ALT 250 FOR IP): Performed by: INTERNAL MEDICINE

## 2025-08-12 PROCEDURE — 80053 COMPREHEN METABOLIC PANEL: CPT

## 2025-08-12 PROCEDURE — 51798 US URINE CAPACITY MEASURE: CPT

## 2025-08-12 PROCEDURE — 2580000003 HC RX 258: Performed by: INTERNAL MEDICINE

## 2025-08-12 PROCEDURE — 72148 MRI LUMBAR SPINE W/O DYE: CPT

## 2025-08-12 PROCEDURE — 72146 MRI CHEST SPINE W/O DYE: CPT

## 2025-08-12 RX ORDER — SODIUM CHLORIDE 9 MG/ML
INJECTION, SOLUTION INTRAVENOUS CONTINUOUS
Status: DISCONTINUED | OUTPATIENT
Start: 2025-08-12 | End: 2025-08-13 | Stop reason: HOSPADM

## 2025-08-12 RX ORDER — CLOPIDOGREL BISULFATE 75 MG/1
75 TABLET ORAL DAILY
Status: DISCONTINUED | OUTPATIENT
Start: 2025-08-12 | End: 2025-08-13 | Stop reason: HOSPADM

## 2025-08-12 RX ORDER — OXYCODONE HYDROCHLORIDE 15 MG/1
15 TABLET ORAL 3 TIMES DAILY PRN
Status: DISCONTINUED | OUTPATIENT
Start: 2025-08-12 | End: 2025-08-13 | Stop reason: HOSPADM

## 2025-08-12 RX ORDER — PANTOPRAZOLE SODIUM 40 MG/1
40 TABLET, DELAYED RELEASE ORAL
Status: DISCONTINUED | OUTPATIENT
Start: 2025-08-13 | End: 2025-08-13 | Stop reason: HOSPADM

## 2025-08-12 RX ORDER — SODIUM CHLORIDE 0.9 % (FLUSH) 0.9 %
5-40 SYRINGE (ML) INJECTION EVERY 12 HOURS SCHEDULED
Status: DISCONTINUED | OUTPATIENT
Start: 2025-08-12 | End: 2025-08-13 | Stop reason: HOSPADM

## 2025-08-12 RX ORDER — ACETAMINOPHEN 325 MG/1
650 TABLET ORAL EVERY 6 HOURS PRN
Status: DISCONTINUED | OUTPATIENT
Start: 2025-08-12 | End: 2025-08-13 | Stop reason: HOSPADM

## 2025-08-12 RX ORDER — SODIUM CHLORIDE 9 MG/ML
INJECTION, SOLUTION INTRAVENOUS PRN
Status: DISCONTINUED | OUTPATIENT
Start: 2025-08-12 | End: 2025-08-13 | Stop reason: HOSPADM

## 2025-08-12 RX ORDER — HYDRALAZINE HYDROCHLORIDE 20 MG/ML
5 INJECTION INTRAMUSCULAR; INTRAVENOUS EVERY 6 HOURS PRN
Status: DISCONTINUED | OUTPATIENT
Start: 2025-08-12 | End: 2025-08-13 | Stop reason: HOSPADM

## 2025-08-12 RX ORDER — ENOXAPARIN SODIUM 100 MG/ML
30 INJECTION SUBCUTANEOUS DAILY
Status: DISCONTINUED | OUTPATIENT
Start: 2025-08-13 | End: 2025-08-13

## 2025-08-12 RX ORDER — MAGNESIUM SULFATE IN WATER 40 MG/ML
2000 INJECTION, SOLUTION INTRAVENOUS PRN
Status: DISCONTINUED | OUTPATIENT
Start: 2025-08-12 | End: 2025-08-13 | Stop reason: HOSPADM

## 2025-08-12 RX ORDER — POLYETHYLENE GLYCOL 3350 17 G/17G
17 POWDER, FOR SOLUTION ORAL DAILY PRN
Status: DISCONTINUED | OUTPATIENT
Start: 2025-08-12 | End: 2025-08-13 | Stop reason: HOSPADM

## 2025-08-12 RX ORDER — POTASSIUM CHLORIDE 1500 MG/1
40 TABLET, EXTENDED RELEASE ORAL PRN
Status: DISCONTINUED | OUTPATIENT
Start: 2025-08-12 | End: 2025-08-13 | Stop reason: HOSPADM

## 2025-08-12 RX ORDER — ASPIRIN 81 MG/1
81 TABLET ORAL DAILY
Status: DISCONTINUED | OUTPATIENT
Start: 2025-08-12 | End: 2025-08-13 | Stop reason: HOSPADM

## 2025-08-12 RX ORDER — POTASSIUM CHLORIDE 7.45 MG/ML
10 INJECTION INTRAVENOUS PRN
Status: DISCONTINUED | OUTPATIENT
Start: 2025-08-12 | End: 2025-08-13 | Stop reason: HOSPADM

## 2025-08-12 RX ORDER — AMIODARONE HYDROCHLORIDE 200 MG/1
100 TABLET ORAL DAILY
Status: DISCONTINUED | OUTPATIENT
Start: 2025-08-12 | End: 2025-08-13 | Stop reason: HOSPADM

## 2025-08-12 RX ORDER — ATORVASTATIN CALCIUM 80 MG/1
80 TABLET, FILM COATED ORAL DAILY
Status: DISCONTINUED | OUTPATIENT
Start: 2025-08-12 | End: 2025-08-13 | Stop reason: HOSPADM

## 2025-08-12 RX ORDER — TAMSULOSIN HYDROCHLORIDE 0.4 MG/1
0.4 CAPSULE ORAL DAILY
Status: DISCONTINUED | OUTPATIENT
Start: 2025-08-12 | End: 2025-08-13 | Stop reason: HOSPADM

## 2025-08-12 RX ORDER — ONDANSETRON 2 MG/ML
4 INJECTION INTRAMUSCULAR; INTRAVENOUS EVERY 6 HOURS PRN
Status: DISCONTINUED | OUTPATIENT
Start: 2025-08-12 | End: 2025-08-13 | Stop reason: HOSPADM

## 2025-08-12 RX ORDER — METOPROLOL SUCCINATE 50 MG/1
50 TABLET, EXTENDED RELEASE ORAL 2 TIMES DAILY
Status: DISCONTINUED | OUTPATIENT
Start: 2025-08-12 | End: 2025-08-13 | Stop reason: HOSPADM

## 2025-08-12 RX ORDER — ONDANSETRON 4 MG/1
4 TABLET, ORALLY DISINTEGRATING ORAL EVERY 8 HOURS PRN
Status: DISCONTINUED | OUTPATIENT
Start: 2025-08-12 | End: 2025-08-13 | Stop reason: HOSPADM

## 2025-08-12 RX ORDER — ACETAMINOPHEN 650 MG/1
650 SUPPOSITORY RECTAL EVERY 6 HOURS PRN
Status: DISCONTINUED | OUTPATIENT
Start: 2025-08-12 | End: 2025-08-13 | Stop reason: HOSPADM

## 2025-08-12 RX ORDER — SODIUM CHLORIDE 0.9 % (FLUSH) 0.9 %
5-40 SYRINGE (ML) INJECTION PRN
Status: DISCONTINUED | OUTPATIENT
Start: 2025-08-12 | End: 2025-08-13 | Stop reason: HOSPADM

## 2025-08-12 RX ADMIN — SODIUM CHLORIDE: 0.9 INJECTION, SOLUTION INTRAVENOUS at 14:24

## 2025-08-12 RX ADMIN — SODIUM CHLORIDE, PRESERVATIVE FREE 10 ML: 5 INJECTION INTRAVENOUS at 21:56

## 2025-08-12 RX ADMIN — ATORVASTATIN CALCIUM 80 MG: 80 TABLET, FILM COATED ORAL at 17:25

## 2025-08-12 RX ADMIN — METOPROLOL SUCCINATE 50 MG: 50 TABLET, EXTENDED RELEASE ORAL at 17:25

## 2025-08-12 RX ADMIN — TAMSULOSIN HYDROCHLORIDE 0.4 MG: 0.4 CAPSULE ORAL at 17:25

## 2025-08-12 RX ADMIN — ASPIRIN 81 MG: 81 TABLET, COATED ORAL at 17:25

## 2025-08-12 RX ADMIN — CLOPIDOGREL BISULFATE 75 MG: 75 TABLET, FILM COATED ORAL at 17:25

## 2025-08-12 RX ADMIN — AMIODARONE HYDROCHLORIDE 100 MG: 200 TABLET ORAL at 17:25

## 2025-08-13 VITALS
BODY MASS INDEX: 24.3 KG/M2 | RESPIRATION RATE: 18 BRPM | HEART RATE: 58 BPM | TEMPERATURE: 97.4 F | OXYGEN SATURATION: 97 % | DIASTOLIC BLOOD PRESSURE: 76 MMHG | WEIGHT: 173.6 LBS | SYSTOLIC BLOOD PRESSURE: 151 MMHG | HEIGHT: 71 IN

## 2025-08-13 PROBLEM — R20.2 PARESTHESIA OF BILATERAL LEGS: Status: ACTIVE | Noted: 2025-08-13

## 2025-08-13 LAB
ALBUMIN SERPL-MCNC: 3.7 G/DL (ref 3.4–5)
ALBUMIN/GLOB SERPL: 1.5 {RATIO} (ref 1.1–2.2)
ALP SERPL-CCNC: 80 U/L (ref 40–129)
ALT SERPL-CCNC: 11 U/L (ref 10–40)
ANION GAP SERPL CALCULATED.3IONS-SCNC: 11 MMOL/L (ref 3–16)
AST SERPL-CCNC: 18 U/L (ref 15–37)
BILIRUB SERPL-MCNC: 0.7 MG/DL (ref 0–1)
BUN SERPL-MCNC: 34 MG/DL (ref 7–20)
CALCIUM SERPL-MCNC: 9.1 MG/DL (ref 8.3–10.6)
CHLORIDE SERPL-SCNC: 105 MMOL/L (ref 99–110)
CO2 SERPL-SCNC: 21 MMOL/L (ref 21–32)
CREAT SERPL-MCNC: 1.9 MG/DL (ref 0.8–1.3)
CREAT UR-MCNC: 164 MG/DL (ref 39–259)
CREAT UR-MCNC: 169 MG/DL (ref 39–259)
DEPRECATED RDW RBC AUTO: 12.9 % (ref 12.4–15.4)
FERRITIN SERPL IA-MCNC: 228 NG/ML (ref 30–400)
FOLATE SERPL-MCNC: 6.49 NG/ML (ref 4.78–24.2)
GFR SERPLBLD CREATININE-BSD FMLA CKD-EPI: 38 ML/MIN/{1.73_M2}
GLUCOSE SERPL-MCNC: 102 MG/DL (ref 70–99)
HCT VFR BLD AUTO: 35.2 % (ref 40.5–52.5)
HGB BLD-MCNC: 12.5 G/DL (ref 13.5–17.5)
IRON SATN MFR SERPL: 32 % (ref 20–50)
IRON SERPL-MCNC: 80 UG/DL (ref 59–158)
MAGNESIUM SERPL-MCNC: 2.26 MG/DL (ref 1.8–2.4)
MCH RBC QN AUTO: 32.2 PG (ref 26–34)
MCHC RBC AUTO-ENTMCNC: 35.5 G/DL (ref 31–36)
MCV RBC AUTO: 90.6 FL (ref 80–100)
MICROALBUMIN UR DL<=1MG/L-MCNC: <1.2 MG/DL
MICROALBUMIN/CREAT UR: NORMAL MG/G (ref 0–30)
PHOSPHATE SERPL-MCNC: 2.8 MG/DL (ref 2.5–4.9)
PLATELET # BLD AUTO: 157 K/UL (ref 135–450)
PMV BLD AUTO: 9 FL (ref 5–10.5)
POTASSIUM SERPL-SCNC: 5 MMOL/L (ref 3.5–5.1)
PROT SERPL-MCNC: 6.2 G/DL (ref 6.4–8.2)
PROT UR-MCNC: 12.6 MG/DL
PROT/CREAT UR-RTO: 0.1 MG/DL
RBC # BLD AUTO: 3.89 M/UL (ref 4.2–5.9)
SODIUM SERPL-SCNC: 137 MMOL/L (ref 136–145)
TIBC SERPL-MCNC: 250 UG/DL (ref 260–445)
VIT B12 SERPL-MCNC: 229 PG/ML (ref 211–911)
WBC # BLD AUTO: 6.4 K/UL (ref 4–11)

## 2025-08-13 PROCEDURE — 84100 ASSAY OF PHOSPHORUS: CPT

## 2025-08-13 PROCEDURE — 83550 IRON BINDING TEST: CPT

## 2025-08-13 PROCEDURE — 85027 COMPLETE CBC AUTOMATED: CPT

## 2025-08-13 PROCEDURE — 82570 ASSAY OF URINE CREATININE: CPT

## 2025-08-13 PROCEDURE — 99222 1ST HOSP IP/OBS MODERATE 55: CPT | Performed by: PSYCHIATRY & NEUROLOGY

## 2025-08-13 PROCEDURE — 6370000000 HC RX 637 (ALT 250 FOR IP): Performed by: INTERNAL MEDICINE

## 2025-08-13 PROCEDURE — 94760 N-INVAS EAR/PLS OXIMETRY 1: CPT

## 2025-08-13 PROCEDURE — 84156 ASSAY OF PROTEIN URINE: CPT

## 2025-08-13 PROCEDURE — 96372 THER/PROPH/DIAG INJ SC/IM: CPT

## 2025-08-13 PROCEDURE — G0378 HOSPITAL OBSERVATION PER HR: HCPCS

## 2025-08-13 PROCEDURE — 36415 COLL VENOUS BLD VENIPUNCTURE: CPT

## 2025-08-13 PROCEDURE — 80053 COMPREHEN METABOLIC PANEL: CPT

## 2025-08-13 PROCEDURE — 82728 ASSAY OF FERRITIN: CPT

## 2025-08-13 PROCEDURE — 82746 ASSAY OF FOLIC ACID SERUM: CPT

## 2025-08-13 PROCEDURE — 82607 VITAMIN B-12: CPT

## 2025-08-13 PROCEDURE — 97161 PT EVAL LOW COMPLEX 20 MIN: CPT

## 2025-08-13 PROCEDURE — 6360000002 HC RX W HCPCS: Performed by: INTERNAL MEDICINE

## 2025-08-13 PROCEDURE — 83540 ASSAY OF IRON: CPT

## 2025-08-13 PROCEDURE — 2500000003 HC RX 250 WO HCPCS: Performed by: INTERNAL MEDICINE

## 2025-08-13 PROCEDURE — 97165 OT EVAL LOW COMPLEX 30 MIN: CPT

## 2025-08-13 PROCEDURE — 83735 ASSAY OF MAGNESIUM: CPT

## 2025-08-13 PROCEDURE — 82043 UR ALBUMIN QUANTITATIVE: CPT

## 2025-08-13 RX ORDER — ENOXAPARIN SODIUM 100 MG/ML
40 INJECTION SUBCUTANEOUS DAILY
Status: DISCONTINUED | OUTPATIENT
Start: 2025-08-13 | End: 2025-08-13 | Stop reason: HOSPADM

## 2025-08-13 RX ADMIN — PANTOPRAZOLE SODIUM 40 MG: 40 TABLET, DELAYED RELEASE ORAL at 08:42

## 2025-08-13 RX ADMIN — SODIUM CHLORIDE, PRESERVATIVE FREE 10 ML: 5 INJECTION INTRAVENOUS at 08:45

## 2025-08-13 RX ADMIN — AMIODARONE HYDROCHLORIDE 100 MG: 200 TABLET ORAL at 08:44

## 2025-08-13 RX ADMIN — ATORVASTATIN CALCIUM 80 MG: 80 TABLET, FILM COATED ORAL at 08:44

## 2025-08-13 RX ADMIN — TAMSULOSIN HYDROCHLORIDE 0.4 MG: 0.4 CAPSULE ORAL at 08:43

## 2025-08-13 RX ADMIN — METOPROLOL SUCCINATE 50 MG: 50 TABLET, EXTENDED RELEASE ORAL at 08:43

## 2025-08-13 RX ADMIN — ASPIRIN 81 MG: 81 TABLET, COATED ORAL at 08:43

## 2025-08-13 RX ADMIN — CLOPIDOGREL BISULFATE 75 MG: 75 TABLET, FILM COATED ORAL at 08:43

## 2025-08-13 RX ADMIN — ENOXAPARIN SODIUM 40 MG: 100 INJECTION SUBCUTANEOUS at 08:44
